# Patient Record
Sex: FEMALE | Race: WHITE | NOT HISPANIC OR LATINO | Employment: PART TIME | URBAN - METROPOLITAN AREA
[De-identification: names, ages, dates, MRNs, and addresses within clinical notes are randomized per-mention and may not be internally consistent; named-entity substitution may affect disease eponyms.]

---

## 2017-09-11 ENCOUNTER — COMMUNICATION - HEALTHEAST (OUTPATIENT)
Dept: FAMILY MEDICINE | Facility: CLINIC | Age: 37
End: 2017-09-11

## 2017-09-15 ENCOUNTER — AMBULATORY - HEALTHEAST (OUTPATIENT)
Dept: NURSING | Facility: CLINIC | Age: 37
End: 2017-09-15

## 2017-12-11 ENCOUNTER — OFFICE VISIT - HEALTHEAST (OUTPATIENT)
Dept: ALLERGY | Facility: CLINIC | Age: 37
End: 2017-12-11

## 2017-12-11 DIAGNOSIS — T78.1XXD ADVERSE FOOD REACTION, SUBSEQUENT ENCOUNTER: ICD-10-CM

## 2017-12-11 DIAGNOSIS — R09.81 NASAL CONGESTION: ICD-10-CM

## 2017-12-11 DIAGNOSIS — J31.0 RHINITIS: ICD-10-CM

## 2017-12-11 ASSESSMENT — MIFFLIN-ST. JEOR: SCORE: 1343.54

## 2018-02-13 ENCOUNTER — AMBULATORY - HEALTHEAST (OUTPATIENT)
Dept: MULTI SPECIALTY CLINIC | Facility: CLINIC | Age: 38
End: 2018-02-13

## 2018-02-13 LAB
HPV_EXT - HISTORICAL: NORMAL
PAP SMEAR - HIM PATIENT REPORTED: NORMAL

## 2018-09-05 ENCOUNTER — COMMUNICATION - HEALTHEAST (OUTPATIENT)
Dept: FAMILY MEDICINE | Facility: CLINIC | Age: 38
End: 2018-09-05

## 2018-09-17 ENCOUNTER — AMBULATORY - HEALTHEAST (OUTPATIENT)
Dept: NURSING | Facility: CLINIC | Age: 38
End: 2018-09-17

## 2018-09-17 DIAGNOSIS — Z23 NEED FOR INFLUENZA VACCINATION: ICD-10-CM

## 2019-12-17 ENCOUNTER — OFFICE VISIT - HEALTHEAST (OUTPATIENT)
Dept: FAMILY MEDICINE | Facility: CLINIC | Age: 39
End: 2019-12-17

## 2019-12-17 DIAGNOSIS — R27.9 LACK OF COORDINATION: ICD-10-CM

## 2019-12-17 DIAGNOSIS — R53.1 RIGHT SIDED WEAKNESS: ICD-10-CM

## 2019-12-17 DIAGNOSIS — R20.0 NUMBNESS: ICD-10-CM

## 2019-12-20 ENCOUNTER — OFFICE VISIT - HEALTHEAST (OUTPATIENT)
Dept: INTERNAL MEDICINE | Facility: CLINIC | Age: 39
End: 2019-12-20

## 2019-12-20 DIAGNOSIS — M62.81 GENERALIZED MUSCLE WEAKNESS: ICD-10-CM

## 2019-12-20 DIAGNOSIS — R53.83 FATIGUE, UNSPECIFIED TYPE: ICD-10-CM

## 2019-12-20 DIAGNOSIS — R20.0 NUMBNESS: ICD-10-CM

## 2019-12-20 LAB
ALBUMIN SERPL-MCNC: 4.5 G/DL (ref 3.5–5)
ALP SERPL-CCNC: 54 U/L (ref 45–120)
ALT SERPL W P-5'-P-CCNC: 15 U/L (ref 0–45)
ANION GAP SERPL CALCULATED.3IONS-SCNC: 12 MMOL/L (ref 5–18)
AST SERPL W P-5'-P-CCNC: 15 U/L (ref 0–40)
BILIRUB SERPL-MCNC: 0.5 MG/DL (ref 0–1)
BUN SERPL-MCNC: 14 MG/DL (ref 8–22)
C REACTIVE PROTEIN LHE: <0.1 MG/DL (ref 0–0.8)
CALCIUM SERPL-MCNC: 9.5 MG/DL (ref 8.5–10.5)
CHLORIDE BLD-SCNC: 108 MMOL/L (ref 98–107)
CO2 SERPL-SCNC: 21 MMOL/L (ref 22–31)
CREAT SERPL-MCNC: 0.74 MG/DL (ref 0.6–1.1)
ERYTHROCYTE [DISTWIDTH] IN BLOOD BY AUTOMATED COUNT: 10.7 % (ref 11–14.5)
ERYTHROCYTE [SEDIMENTATION RATE] IN BLOOD BY WESTERGREN METHOD: 8 MM/HR (ref 0–20)
GFR SERPL CREATININE-BSD FRML MDRD: >60 ML/MIN/1.73M2
GLUCOSE BLD-MCNC: 98 MG/DL (ref 70–125)
HCT VFR BLD AUTO: 42.6 % (ref 35–47)
HGB BLD-MCNC: 14.3 G/DL (ref 12–16)
MCH RBC QN AUTO: 29.5 PG (ref 27–34)
MCHC RBC AUTO-ENTMCNC: 33.5 G/DL (ref 32–36)
MCV RBC AUTO: 88 FL (ref 80–100)
PLATELET # BLD AUTO: 245 THOU/UL (ref 140–440)
PMV BLD AUTO: 8.6 FL (ref 7–10)
POTASSIUM BLD-SCNC: 3.7 MMOL/L (ref 3.5–5)
PROT SERPL-MCNC: 8.2 G/DL (ref 6–8)
RBC # BLD AUTO: 4.83 MILL/UL (ref 3.8–5.4)
RHEUMATOID FACT SERPL-ACNC: <15 IU/ML (ref 0–30)
SODIUM SERPL-SCNC: 141 MMOL/L (ref 136–145)
TSH SERPL DL<=0.005 MIU/L-ACNC: 2.32 UIU/ML (ref 0.3–5)
VIT B12 SERPL-MCNC: 599 PG/ML (ref 213–816)
WBC: 9 THOU/UL (ref 4–11)

## 2019-12-20 ASSESSMENT — PATIENT HEALTH QUESTIONNAIRE - PHQ9: SUM OF ALL RESPONSES TO PHQ QUESTIONS 1-9: 15

## 2019-12-20 ASSESSMENT — MIFFLIN-ST. JEOR: SCORE: 1284.57

## 2019-12-22 LAB — 25(OH)D3 SERPL-MCNC: 12.9 NG/ML (ref 30–80)

## 2019-12-23 LAB
ANA SER QL: 0.3 U
B BURGDOR IGG+IGM SER QL: 0.47 INDEX VALUE

## 2019-12-27 ENCOUNTER — OFFICE VISIT - HEALTHEAST (OUTPATIENT)
Dept: INTERNAL MEDICINE | Facility: CLINIC | Age: 39
End: 2019-12-27

## 2019-12-27 ENCOUNTER — COMMUNICATION - HEALTHEAST (OUTPATIENT)
Dept: NEUROSURGERY | Facility: CLINIC | Age: 39
End: 2019-12-27

## 2019-12-27 DIAGNOSIS — R20.2 TINGLING: ICD-10-CM

## 2019-12-27 DIAGNOSIS — M62.81 MUSCLE WEAKNESS (GENERALIZED): ICD-10-CM

## 2019-12-27 LAB — CK SERPL-CCNC: 119 U/L (ref 30–190)

## 2019-12-27 ASSESSMENT — MIFFLIN-ST. JEOR: SCORE: 1293.64

## 2020-01-06 ENCOUNTER — COMMUNICATION - HEALTHEAST (OUTPATIENT)
Dept: INTERNAL MEDICINE | Facility: CLINIC | Age: 40
End: 2020-01-06

## 2020-01-06 ENCOUNTER — COMMUNICATION - HEALTHEAST (OUTPATIENT)
Dept: SCHEDULING | Facility: CLINIC | Age: 40
End: 2020-01-06

## 2020-01-09 ENCOUNTER — OFFICE VISIT - HEALTHEAST (OUTPATIENT)
Dept: NEUROSURGERY | Facility: CLINIC | Age: 40
End: 2020-01-09

## 2020-01-09 ENCOUNTER — COMMUNICATION - HEALTHEAST (OUTPATIENT)
Dept: INTERNAL MEDICINE | Facility: CLINIC | Age: 40
End: 2020-01-09

## 2020-01-09 DIAGNOSIS — F41.9 ANXIETY: ICD-10-CM

## 2020-01-09 DIAGNOSIS — I67.1 MIDDLE CEREBRAL ARTERY ANEURYSM: ICD-10-CM

## 2020-01-09 ASSESSMENT — MIFFLIN-ST. JEOR: SCORE: 1293.64

## 2020-01-14 ENCOUNTER — COMMUNICATION - HEALTHEAST (OUTPATIENT)
Dept: INTERNAL MEDICINE | Facility: CLINIC | Age: 40
End: 2020-01-14

## 2020-01-17 ENCOUNTER — COMMUNICATION - HEALTHEAST (OUTPATIENT)
Dept: INTERNAL MEDICINE | Facility: CLINIC | Age: 40
End: 2020-01-17

## 2020-01-27 ENCOUNTER — COMMUNICATION - HEALTHEAST (OUTPATIENT)
Dept: INTERNAL MEDICINE | Facility: CLINIC | Age: 40
End: 2020-01-27

## 2020-01-31 ENCOUNTER — RECORDS - HEALTHEAST (OUTPATIENT)
Dept: ADMINISTRATIVE | Facility: OTHER | Age: 40
End: 2020-01-31

## 2020-02-03 ENCOUNTER — AMBULATORY - HEALTHEAST (OUTPATIENT)
Dept: ADMINISTRATIVE | Facility: REHABILITATION | Age: 40
End: 2020-02-03

## 2020-02-03 DIAGNOSIS — R53.1 GENERAL WEAKNESS: ICD-10-CM

## 2020-02-03 DIAGNOSIS — R20.0 NUMBNESS: ICD-10-CM

## 2020-02-07 ENCOUNTER — HOSPITAL ENCOUNTER (OUTPATIENT)
Dept: MRI IMAGING | Facility: CLINIC | Age: 40
Discharge: HOME | End: 2020-02-07
Attending: PSYCHIATRY & NEUROLOGY

## 2020-02-07 ENCOUNTER — RECORDS - HEALTHEAST (OUTPATIENT)
Dept: LAB | Facility: CLINIC | Age: 40
End: 2020-02-07

## 2020-02-07 DIAGNOSIS — R20.0 NUMBNESS: ICD-10-CM

## 2020-02-07 DIAGNOSIS — R53.1 GENERALIZED WEAKNESS: ICD-10-CM

## 2020-02-07 LAB
CK SERPL-CCNC: 116 U/L (ref 30–190)
ERYTHROCYTE [SEDIMENTATION RATE] IN BLOOD BY WESTERGREN METHOD: 6 MM/HR (ref 0–20)
FERRITIN SERPL-MCNC: 17 NG/ML (ref 10–130)
MAGNESIUM SERPL-MCNC: 1.9 MG/DL (ref 1.8–2.6)

## 2020-02-08 LAB — METHYLMALONATE SERPL-SCNC: 0.13 UMOL/L (ref 0–0.4)

## 2020-02-11 LAB
ALBUMIN PERCENT: 63.6 % (ref 51–67)
ALBUMIN SERPL ELPH-MCNC: 4.2 G/DL (ref 3.2–4.7)
ALPHA 1 PERCENT: 2.7 % (ref 2–4)
ALPHA 2 PERCENT: 10.2 % (ref 5–13)
ALPHA1 GLOB SERPL ELPH-MCNC: 0.2 G/DL (ref 0.1–0.3)
ALPHA2 GLOB SERPL ELPH-MCNC: 0.7 G/DL (ref 0.4–0.9)
B-GLOBULIN SERPL ELPH-MCNC: 0.8 G/DL (ref 0.7–1.2)
BETA PERCENT: 11.6 % (ref 10–17)
GAMMA GLOB SERPL ELPH-MCNC: 0.8 G/DL (ref 0.6–1.4)
GAMMA GLOBULIN PERCENT: 11.9 % (ref 9–20)
PATH ICD:: NORMAL
PROT PATTERN SERPL ELPH-IMP: NORMAL
PROT SERPL-MCNC: 6.6 G/DL (ref 6–8)
REVIEWING PATHOLOGIST: NORMAL

## 2020-02-13 LAB — MUSK AB SER-SCNC: 0 NMOL/L (ref 0–0.02)

## 2020-02-14 LAB
ACHR BIND IGG+IGM SER IA-SCNC: 0 NMOL/L
ACHR MOD AB/ACHR TOTAL SFR SER: 0 %
IMMUNOLOGIST REVIEW: NORMAL
STRIA MUS AB TITR SER: NEGATIVE TITER

## 2020-03-04 ENCOUNTER — RECORDS - HEALTHEAST (OUTPATIENT)
Dept: ADMINISTRATIVE | Facility: OTHER | Age: 40
End: 2020-03-04

## 2020-05-04 ENCOUNTER — OFFICE VISIT - HEALTHEAST (OUTPATIENT)
Dept: INTERNAL MEDICINE | Facility: CLINIC | Age: 40
End: 2020-05-04

## 2020-05-04 DIAGNOSIS — F41.9 ANXIETY: ICD-10-CM

## 2020-05-04 DIAGNOSIS — Z86.19 HX OF VIRAL ILLNESS: ICD-10-CM

## 2020-05-04 DIAGNOSIS — M79.10 MYALGIA: ICD-10-CM

## 2020-05-04 ASSESSMENT — ANXIETY QUESTIONNAIRES
5. BEING SO RESTLESS THAT IT IS HARD TO SIT STILL: MORE THAN HALF THE DAYS
1. FEELING NERVOUS, ANXIOUS, OR ON EDGE: NEARLY EVERY DAY
3. WORRYING TOO MUCH ABOUT DIFFERENT THINGS: NEARLY EVERY DAY
4. TROUBLE RELAXING: NEARLY EVERY DAY
2. NOT BEING ABLE TO STOP OR CONTROL WORRYING: NEARLY EVERY DAY
7. FEELING AFRAID AS IF SOMETHING AWFUL MIGHT HAPPEN: NEARLY EVERY DAY
IF YOU CHECKED OFF ANY PROBLEMS ON THIS QUESTIONNAIRE, HOW DIFFICULT HAVE THESE PROBLEMS MADE IT FOR YOU TO DO YOUR WORK, TAKE CARE OF THINGS AT HOME, OR GET ALONG WITH OTHER PEOPLE: EXTREMELY DIFFICULT
6. BECOMING EASILY ANNOYED OR IRRITABLE: NEARLY EVERY DAY
GAD7 TOTAL SCORE: 20

## 2020-05-18 ENCOUNTER — OFFICE VISIT - HEALTHEAST (OUTPATIENT)
Dept: INTERNAL MEDICINE | Facility: CLINIC | Age: 40
End: 2020-05-18

## 2020-05-18 ENCOUNTER — COMMUNICATION - HEALTHEAST (OUTPATIENT)
Dept: INTERNAL MEDICINE | Facility: CLINIC | Age: 40
End: 2020-05-18

## 2020-05-18 DIAGNOSIS — R20.0 NUMBNESS: ICD-10-CM

## 2020-05-18 DIAGNOSIS — F32.A DEPRESSIVE DISORDER: ICD-10-CM

## 2020-05-18 DIAGNOSIS — R53.83 FATIGUE, UNSPECIFIED TYPE: ICD-10-CM

## 2020-05-20 ENCOUNTER — OFFICE VISIT - HEALTHEAST (OUTPATIENT)
Dept: BEHAVIORAL HEALTH | Facility: CLINIC | Age: 40
End: 2020-05-20

## 2020-05-20 DIAGNOSIS — F43.23 ADJUSTMENT DISORDER WITH MIXED ANXIETY AND DEPRESSED MOOD: ICD-10-CM

## 2020-05-20 ASSESSMENT — ANXIETY QUESTIONNAIRES
GAD7 TOTAL SCORE: 21
6. BECOMING EASILY ANNOYED OR IRRITABLE: NEARLY EVERY DAY
3. WORRYING TOO MUCH ABOUT DIFFERENT THINGS: NEARLY EVERY DAY
4. TROUBLE RELAXING: NEARLY EVERY DAY
IF YOU CHECKED OFF ANY PROBLEMS ON THIS QUESTIONNAIRE, HOW DIFFICULT HAVE THESE PROBLEMS MADE IT FOR YOU TO DO YOUR WORK, TAKE CARE OF THINGS AT HOME, OR GET ALONG WITH OTHER PEOPLE: EXTREMELY DIFFICULT
7. FEELING AFRAID AS IF SOMETHING AWFUL MIGHT HAPPEN: NEARLY EVERY DAY
1. FEELING NERVOUS, ANXIOUS, OR ON EDGE: NEARLY EVERY DAY
5. BEING SO RESTLESS THAT IT IS HARD TO SIT STILL: NEARLY EVERY DAY
2. NOT BEING ABLE TO STOP OR CONTROL WORRYING: NEARLY EVERY DAY

## 2020-05-20 ASSESSMENT — PATIENT HEALTH QUESTIONNAIRE - PHQ9: SUM OF ALL RESPONSES TO PHQ QUESTIONS 1-9: 22

## 2020-05-22 ENCOUNTER — COMMUNICATION - HEALTHEAST (OUTPATIENT)
Dept: INTERNAL MEDICINE | Facility: CLINIC | Age: 40
End: 2020-05-22

## 2020-05-22 ENCOUNTER — COMMUNICATION - HEALTHEAST (OUTPATIENT)
Dept: SCHEDULING | Facility: CLINIC | Age: 40
End: 2020-05-22

## 2020-06-09 ENCOUNTER — COMMUNICATION - HEALTHEAST (OUTPATIENT)
Dept: RHEUMATOLOGY | Facility: CLINIC | Age: 40
End: 2020-06-09

## 2020-06-09 ENCOUNTER — OFFICE VISIT - HEALTHEAST (OUTPATIENT)
Dept: RHEUMATOLOGY | Facility: CLINIC | Age: 40
End: 2020-06-09

## 2020-06-09 DIAGNOSIS — M79.18 MYOFASCIAL PAIN: ICD-10-CM

## 2020-06-09 DIAGNOSIS — M54.2 CHRONIC NECK PAIN: ICD-10-CM

## 2020-06-09 DIAGNOSIS — G89.29 CHRONIC UPPER BACK PAIN: ICD-10-CM

## 2020-06-09 DIAGNOSIS — G89.29 CHRONIC NECK PAIN: ICD-10-CM

## 2020-06-09 DIAGNOSIS — M25.50 MULTIPLE JOINT PAIN: ICD-10-CM

## 2020-06-09 DIAGNOSIS — M54.9 CHRONIC UPPER BACK PAIN: ICD-10-CM

## 2020-06-09 DIAGNOSIS — R53.1 FEELING WEAK: ICD-10-CM

## 2020-06-09 DIAGNOSIS — E55.9 VITAMIN D DEFICIENCY: ICD-10-CM

## 2020-06-09 DIAGNOSIS — R20.2 PARESTHESIA: ICD-10-CM

## 2020-06-09 RX ORDER — CYCLOBENZAPRINE HCL 10 MG
TABLET ORAL
Qty: 30 TABLET | Refills: 1 | Status: SHIPPED | OUTPATIENT
Start: 2020-06-09 | End: 2022-04-19

## 2020-06-11 ENCOUNTER — COMMUNICATION - HEALTHEAST (OUTPATIENT)
Dept: ADMINISTRATIVE | Facility: CLINIC | Age: 40
End: 2020-06-11

## 2020-06-16 ENCOUNTER — OFFICE VISIT - HEALTHEAST (OUTPATIENT)
Dept: BEHAVIORAL HEALTH | Facility: CLINIC | Age: 40
End: 2020-06-16

## 2020-06-16 ENCOUNTER — COMMUNICATION - HEALTHEAST (OUTPATIENT)
Dept: BEHAVIORAL HEALTH | Facility: CLINIC | Age: 40
End: 2020-06-16

## 2020-06-16 DIAGNOSIS — F43.23 ADJUSTMENT DISORDER WITH MIXED ANXIETY AND DEPRESSED MOOD: ICD-10-CM

## 2020-06-17 ENCOUNTER — COMMUNICATION - HEALTHEAST (OUTPATIENT)
Dept: BEHAVIORAL HEALTH | Facility: CLINIC | Age: 40
End: 2020-06-17

## 2020-06-17 ENCOUNTER — OFFICE VISIT - HEALTHEAST (OUTPATIENT)
Dept: BEHAVIORAL HEALTH | Facility: CLINIC | Age: 40
End: 2020-06-17

## 2020-06-17 DIAGNOSIS — F32.A DEPRESSIVE DISORDER: ICD-10-CM

## 2020-06-17 ASSESSMENT — ANXIETY QUESTIONNAIRES
7. FEELING AFRAID AS IF SOMETHING AWFUL MIGHT HAPPEN: NEARLY EVERY DAY
GAD7 TOTAL SCORE: 19
5. BEING SO RESTLESS THAT IT IS HARD TO SIT STILL: SEVERAL DAYS
6. BECOMING EASILY ANNOYED OR IRRITABLE: NEARLY EVERY DAY
3. WORRYING TOO MUCH ABOUT DIFFERENT THINGS: NEARLY EVERY DAY
2. NOT BEING ABLE TO STOP OR CONTROL WORRYING: NEARLY EVERY DAY
1. FEELING NERVOUS, ANXIOUS, OR ON EDGE: NEARLY EVERY DAY
4. TROUBLE RELAXING: NEARLY EVERY DAY

## 2020-06-17 ASSESSMENT — PATIENT HEALTH QUESTIONNAIRE - PHQ9: SUM OF ALL RESPONSES TO PHQ QUESTIONS 1-9: 22

## 2020-06-22 ENCOUNTER — OFFICE VISIT - HEALTHEAST (OUTPATIENT)
Dept: INTERNAL MEDICINE | Facility: CLINIC | Age: 40
End: 2020-06-22

## 2020-06-22 DIAGNOSIS — G89.29 OTHER CHRONIC PAIN: ICD-10-CM

## 2020-06-22 DIAGNOSIS — F32.A DEPRESSIVE DISORDER: ICD-10-CM

## 2020-06-22 DIAGNOSIS — F41.9 ANXIETY: ICD-10-CM

## 2020-06-22 RX ORDER — IBUPROFEN 200 MG
400 TABLET ORAL EVERY 6 HOURS PRN
Status: SHIPPED | COMMUNITY
Start: 2020-06-22 | End: 2022-04-19

## 2020-06-22 RX ORDER — ACETAMINOPHEN 500 MG
500 TABLET ORAL EVERY 6 HOURS PRN
Status: SHIPPED | COMMUNITY
Start: 2020-06-22

## 2020-06-25 ENCOUNTER — OFFICE VISIT - HEALTHEAST (OUTPATIENT)
Dept: BEHAVIORAL HEALTH | Facility: CLINIC | Age: 40
End: 2020-06-25

## 2020-06-25 DIAGNOSIS — F43.23 ADJUSTMENT DISORDER WITH MIXED ANXIETY AND DEPRESSED MOOD: ICD-10-CM

## 2020-07-02 ENCOUNTER — OFFICE VISIT - HEALTHEAST (OUTPATIENT)
Dept: BEHAVIORAL HEALTH | Facility: CLINIC | Age: 40
End: 2020-07-02

## 2020-07-02 DIAGNOSIS — F43.23 ADJUSTMENT DISORDER WITH MIXED ANXIETY AND DEPRESSED MOOD: ICD-10-CM

## 2020-07-02 ASSESSMENT — ANXIETY QUESTIONNAIRES
5. BEING SO RESTLESS THAT IT IS HARD TO SIT STILL: NEARLY EVERY DAY
7. FEELING AFRAID AS IF SOMETHING AWFUL MIGHT HAPPEN: NEARLY EVERY DAY
6. BECOMING EASILY ANNOYED OR IRRITABLE: NEARLY EVERY DAY
2. NOT BEING ABLE TO STOP OR CONTROL WORRYING: NEARLY EVERY DAY
4. TROUBLE RELAXING: NEARLY EVERY DAY
3. WORRYING TOO MUCH ABOUT DIFFERENT THINGS: NEARLY EVERY DAY
GAD7 TOTAL SCORE: 21
1. FEELING NERVOUS, ANXIOUS, OR ON EDGE: NEARLY EVERY DAY

## 2020-07-02 ASSESSMENT — PATIENT HEALTH QUESTIONNAIRE - PHQ9: SUM OF ALL RESPONSES TO PHQ QUESTIONS 1-9: 23

## 2020-07-06 ENCOUNTER — OFFICE VISIT - HEALTHEAST (OUTPATIENT)
Dept: BEHAVIORAL HEALTH | Facility: CLINIC | Age: 40
End: 2020-07-06

## 2020-07-06 DIAGNOSIS — F43.23 ADJUSTMENT DISORDER WITH MIXED ANXIETY AND DEPRESSED MOOD: ICD-10-CM

## 2020-07-09 ENCOUNTER — COMMUNICATION - HEALTHEAST (OUTPATIENT)
Dept: BEHAVIORAL HEALTH | Facility: CLINIC | Age: 40
End: 2020-07-09

## 2020-07-09 DIAGNOSIS — F32.A DEPRESSIVE DISORDER: ICD-10-CM

## 2020-07-13 ENCOUNTER — OFFICE VISIT - HEALTHEAST (OUTPATIENT)
Dept: BEHAVIORAL HEALTH | Facility: CLINIC | Age: 40
End: 2020-07-13

## 2020-07-13 ENCOUNTER — AMBULATORY - HEALTHEAST (OUTPATIENT)
Dept: BEHAVIORAL HEALTH | Facility: CLINIC | Age: 40
End: 2020-07-13

## 2020-07-13 DIAGNOSIS — F43.23 ADJUSTMENT DISORDER WITH MIXED ANXIETY AND DEPRESSED MOOD: ICD-10-CM

## 2020-07-20 ENCOUNTER — OFFICE VISIT - HEALTHEAST (OUTPATIENT)
Dept: BEHAVIORAL HEALTH | Facility: CLINIC | Age: 40
End: 2020-07-20

## 2020-07-20 DIAGNOSIS — F43.23 ADJUSTMENT DISORDER WITH MIXED ANXIETY AND DEPRESSED MOOD: ICD-10-CM

## 2020-07-24 ENCOUNTER — OFFICE VISIT - HEALTHEAST (OUTPATIENT)
Dept: BEHAVIORAL HEALTH | Facility: CLINIC | Age: 40
End: 2020-07-24

## 2020-07-24 DIAGNOSIS — F43.23 ADJUSTMENT DISORDER WITH MIXED ANXIETY AND DEPRESSED MOOD: ICD-10-CM

## 2020-07-24 DIAGNOSIS — F32.A DEPRESSIVE DISORDER: ICD-10-CM

## 2020-07-24 ASSESSMENT — PATIENT HEALTH QUESTIONNAIRE - PHQ9: SUM OF ALL RESPONSES TO PHQ QUESTIONS 1-9: 22

## 2020-07-24 ASSESSMENT — ANXIETY QUESTIONNAIRES
6. BECOMING EASILY ANNOYED OR IRRITABLE: MORE THAN HALF THE DAYS
3. WORRYING TOO MUCH ABOUT DIFFERENT THINGS: NEARLY EVERY DAY
4. TROUBLE RELAXING: NEARLY EVERY DAY
GAD7 TOTAL SCORE: 19
7. FEELING AFRAID AS IF SOMETHING AWFUL MIGHT HAPPEN: NEARLY EVERY DAY
5. BEING SO RESTLESS THAT IT IS HARD TO SIT STILL: MORE THAN HALF THE DAYS
1. FEELING NERVOUS, ANXIOUS, OR ON EDGE: NEARLY EVERY DAY
2. NOT BEING ABLE TO STOP OR CONTROL WORRYING: NEARLY EVERY DAY

## 2020-07-27 ENCOUNTER — OFFICE VISIT - HEALTHEAST (OUTPATIENT)
Dept: BEHAVIORAL HEALTH | Facility: CLINIC | Age: 40
End: 2020-07-27

## 2020-07-27 DIAGNOSIS — F43.23 ADJUSTMENT DISORDER WITH MIXED ANXIETY AND DEPRESSED MOOD: ICD-10-CM

## 2020-08-03 ENCOUNTER — OFFICE VISIT - HEALTHEAST (OUTPATIENT)
Dept: BEHAVIORAL HEALTH | Facility: CLINIC | Age: 40
End: 2020-08-03

## 2020-08-03 DIAGNOSIS — F43.23 ADJUSTMENT DISORDER WITH MIXED ANXIETY AND DEPRESSED MOOD: ICD-10-CM

## 2020-08-07 ENCOUNTER — OFFICE VISIT - HEALTHEAST (OUTPATIENT)
Dept: BEHAVIORAL HEALTH | Facility: CLINIC | Age: 40
End: 2020-08-07

## 2020-08-07 DIAGNOSIS — G89.29 OTHER CHRONIC PAIN: ICD-10-CM

## 2020-08-07 DIAGNOSIS — F43.23 ADJUSTMENT DISORDER WITH MIXED ANXIETY AND DEPRESSED MOOD: ICD-10-CM

## 2020-08-07 ASSESSMENT — ANXIETY QUESTIONNAIRES
2. NOT BEING ABLE TO STOP OR CONTROL WORRYING: NEARLY EVERY DAY
5. BEING SO RESTLESS THAT IT IS HARD TO SIT STILL: MORE THAN HALF THE DAYS
GAD7 TOTAL SCORE: 20
1. FEELING NERVOUS, ANXIOUS, OR ON EDGE: NEARLY EVERY DAY
3. WORRYING TOO MUCH ABOUT DIFFERENT THINGS: NEARLY EVERY DAY
4. TROUBLE RELAXING: NEARLY EVERY DAY
7. FEELING AFRAID AS IF SOMETHING AWFUL MIGHT HAPPEN: NEARLY EVERY DAY
6. BECOMING EASILY ANNOYED OR IRRITABLE: NEARLY EVERY DAY

## 2020-08-07 ASSESSMENT — PATIENT HEALTH QUESTIONNAIRE - PHQ9: SUM OF ALL RESPONSES TO PHQ QUESTIONS 1-9: 23

## 2020-08-10 ENCOUNTER — OFFICE VISIT - HEALTHEAST (OUTPATIENT)
Dept: BEHAVIORAL HEALTH | Facility: CLINIC | Age: 40
End: 2020-08-10

## 2020-08-10 DIAGNOSIS — F43.23 ADJUSTMENT DISORDER WITH MIXED ANXIETY AND DEPRESSED MOOD: ICD-10-CM

## 2020-08-17 ENCOUNTER — OFFICE VISIT - HEALTHEAST (OUTPATIENT)
Dept: BEHAVIORAL HEALTH | Facility: CLINIC | Age: 40
End: 2020-08-17

## 2020-08-17 DIAGNOSIS — F43.23 ADJUSTMENT DISORDER WITH MIXED ANXIETY AND DEPRESSED MOOD: ICD-10-CM

## 2020-08-21 ENCOUNTER — OFFICE VISIT - HEALTHEAST (OUTPATIENT)
Dept: BEHAVIORAL HEALTH | Facility: CLINIC | Age: 40
End: 2020-08-21

## 2020-08-21 DIAGNOSIS — F32.A DEPRESSIVE DISORDER: ICD-10-CM

## 2020-08-21 DIAGNOSIS — F43.23 ADJUSTMENT DISORDER WITH MIXED ANXIETY AND DEPRESSED MOOD: ICD-10-CM

## 2020-08-21 DIAGNOSIS — G89.29 OTHER CHRONIC PAIN: ICD-10-CM

## 2020-08-21 DIAGNOSIS — Z87.828 HISTORY OF TRAUMA: ICD-10-CM

## 2020-08-21 ASSESSMENT — ANXIETY QUESTIONNAIRES
2. NOT BEING ABLE TO STOP OR CONTROL WORRYING: NEARLY EVERY DAY
4. TROUBLE RELAXING: NEARLY EVERY DAY
GAD7 TOTAL SCORE: 18
IF YOU CHECKED OFF ANY PROBLEMS ON THIS QUESTIONNAIRE, HOW DIFFICULT HAVE THESE PROBLEMS MADE IT FOR YOU TO DO YOUR WORK, TAKE CARE OF THINGS AT HOME, OR GET ALONG WITH OTHER PEOPLE: EXTREMELY DIFFICULT
3. WORRYING TOO MUCH ABOUT DIFFERENT THINGS: NEARLY EVERY DAY
1. FEELING NERVOUS, ANXIOUS, OR ON EDGE: NEARLY EVERY DAY
5. BEING SO RESTLESS THAT IT IS HARD TO SIT STILL: SEVERAL DAYS
7. FEELING AFRAID AS IF SOMETHING AWFUL MIGHT HAPPEN: NEARLY EVERY DAY
6. BECOMING EASILY ANNOYED OR IRRITABLE: MORE THAN HALF THE DAYS

## 2020-08-21 ASSESSMENT — PATIENT HEALTH QUESTIONNAIRE - PHQ9: SUM OF ALL RESPONSES TO PHQ QUESTIONS 1-9: 23

## 2020-08-24 ENCOUNTER — OFFICE VISIT - HEALTHEAST (OUTPATIENT)
Dept: BEHAVIORAL HEALTH | Facility: CLINIC | Age: 40
End: 2020-08-24

## 2020-08-24 DIAGNOSIS — F43.23 ADJUSTMENT DISORDER WITH MIXED ANXIETY AND DEPRESSED MOOD: ICD-10-CM

## 2020-09-03 ENCOUNTER — OFFICE VISIT - HEALTHEAST (OUTPATIENT)
Dept: RHEUMATOLOGY | Facility: CLINIC | Age: 40
End: 2020-09-03

## 2020-09-03 ENCOUNTER — COMMUNICATION - HEALTHEAST (OUTPATIENT)
Dept: LAB | Facility: CLINIC | Age: 40
End: 2020-09-03

## 2020-09-03 DIAGNOSIS — M35.3 POLYMYALGIA (H): ICD-10-CM

## 2020-09-03 DIAGNOSIS — M25.50 POLYARTHRALGIA: ICD-10-CM

## 2020-09-08 ENCOUNTER — OFFICE VISIT - HEALTHEAST (OUTPATIENT)
Dept: BEHAVIORAL HEALTH | Facility: CLINIC | Age: 40
End: 2020-09-08

## 2020-09-08 DIAGNOSIS — F43.23 ADJUSTMENT DISORDER WITH MIXED ANXIETY AND DEPRESSED MOOD: ICD-10-CM

## 2020-09-14 ENCOUNTER — OFFICE VISIT - HEALTHEAST (OUTPATIENT)
Dept: BEHAVIORAL HEALTH | Facility: CLINIC | Age: 40
End: 2020-09-14

## 2020-09-14 DIAGNOSIS — F43.23 ADJUSTMENT DISORDER WITH MIXED ANXIETY AND DEPRESSED MOOD: ICD-10-CM

## 2020-09-21 ENCOUNTER — OFFICE VISIT - HEALTHEAST (OUTPATIENT)
Dept: BEHAVIORAL HEALTH | Facility: CLINIC | Age: 40
End: 2020-09-21

## 2020-09-21 DIAGNOSIS — F43.23 ADJUSTMENT DISORDER WITH MIXED ANXIETY AND DEPRESSED MOOD: ICD-10-CM

## 2020-09-26 ENCOUNTER — AMBULATORY - HEALTHEAST (OUTPATIENT)
Dept: NURSING | Facility: CLINIC | Age: 40
End: 2020-09-26

## 2020-09-30 ENCOUNTER — OFFICE VISIT - HEALTHEAST (OUTPATIENT)
Dept: BEHAVIORAL HEALTH | Facility: CLINIC | Age: 40
End: 2020-09-30

## 2020-09-30 DIAGNOSIS — G89.29 OTHER CHRONIC PAIN: ICD-10-CM

## 2020-09-30 DIAGNOSIS — F43.23 ADJUSTMENT DISORDER WITH MIXED ANXIETY AND DEPRESSED MOOD: ICD-10-CM

## 2020-09-30 ASSESSMENT — ANXIETY QUESTIONNAIRES
5. BEING SO RESTLESS THAT IT IS HARD TO SIT STILL: SEVERAL DAYS
1. FEELING NERVOUS, ANXIOUS, OR ON EDGE: MORE THAN HALF THE DAYS
7. FEELING AFRAID AS IF SOMETHING AWFUL MIGHT HAPPEN: MORE THAN HALF THE DAYS
4. TROUBLE RELAXING: MORE THAN HALF THE DAYS
GAD7 TOTAL SCORE: 12
6. BECOMING EASILY ANNOYED OR IRRITABLE: SEVERAL DAYS
2. NOT BEING ABLE TO STOP OR CONTROL WORRYING: MORE THAN HALF THE DAYS
IF YOU CHECKED OFF ANY PROBLEMS ON THIS QUESTIONNAIRE, HOW DIFFICULT HAVE THESE PROBLEMS MADE IT FOR YOU TO DO YOUR WORK, TAKE CARE OF THINGS AT HOME, OR GET ALONG WITH OTHER PEOPLE: SOMEWHAT DIFFICULT
3. WORRYING TOO MUCH ABOUT DIFFERENT THINGS: MORE THAN HALF THE DAYS

## 2020-09-30 ASSESSMENT — PATIENT HEALTH QUESTIONNAIRE - PHQ9: SUM OF ALL RESPONSES TO PHQ QUESTIONS 1-9: 19

## 2020-10-09 ENCOUNTER — OFFICE VISIT - HEALTHEAST (OUTPATIENT)
Dept: BEHAVIORAL HEALTH | Facility: CLINIC | Age: 40
End: 2020-10-09

## 2020-10-09 DIAGNOSIS — F43.23 ADJUSTMENT DISORDER WITH MIXED ANXIETY AND DEPRESSED MOOD: ICD-10-CM

## 2020-10-16 ENCOUNTER — OFFICE VISIT - HEALTHEAST (OUTPATIENT)
Dept: BEHAVIORAL HEALTH | Facility: CLINIC | Age: 40
End: 2020-10-16

## 2020-10-16 DIAGNOSIS — F43.23 ADJUSTMENT DISORDER WITH MIXED ANXIETY AND DEPRESSED MOOD: ICD-10-CM

## 2020-10-21 ENCOUNTER — COMMUNICATION - HEALTHEAST (OUTPATIENT)
Dept: BEHAVIORAL HEALTH | Facility: CLINIC | Age: 40
End: 2020-10-21

## 2020-10-30 ENCOUNTER — AMBULATORY - HEALTHEAST (OUTPATIENT)
Dept: BEHAVIORAL HEALTH | Facility: CLINIC | Age: 40
End: 2020-10-30

## 2020-10-30 ENCOUNTER — OFFICE VISIT - HEALTHEAST (OUTPATIENT)
Dept: BEHAVIORAL HEALTH | Facility: CLINIC | Age: 40
End: 2020-10-30

## 2020-10-30 DIAGNOSIS — F43.23 ADJUSTMENT DISORDER WITH MIXED ANXIETY AND DEPRESSED MOOD: ICD-10-CM

## 2020-10-30 ASSESSMENT — ANXIETY QUESTIONNAIRES
1. FEELING NERVOUS, ANXIOUS, OR ON EDGE: NEARLY EVERY DAY
4. TROUBLE RELAXING: NEARLY EVERY DAY
6. BECOMING EASILY ANNOYED OR IRRITABLE: MORE THAN HALF THE DAYS
5. BEING SO RESTLESS THAT IT IS HARD TO SIT STILL: SEVERAL DAYS
7. FEELING AFRAID AS IF SOMETHING AWFUL MIGHT HAPPEN: NEARLY EVERY DAY
3. WORRYING TOO MUCH ABOUT DIFFERENT THINGS: MORE THAN HALF THE DAYS
GAD7 TOTAL SCORE: 16
IF YOU CHECKED OFF ANY PROBLEMS ON THIS QUESTIONNAIRE, HOW DIFFICULT HAVE THESE PROBLEMS MADE IT FOR YOU TO DO YOUR WORK, TAKE CARE OF THINGS AT HOME, OR GET ALONG WITH OTHER PEOPLE: VERY DIFFICULT
2. NOT BEING ABLE TO STOP OR CONTROL WORRYING: MORE THAN HALF THE DAYS

## 2020-10-30 ASSESSMENT — PATIENT HEALTH QUESTIONNAIRE - PHQ9: SUM OF ALL RESPONSES TO PHQ QUESTIONS 1-9: 12

## 2020-11-02 ENCOUNTER — COMMUNICATION - HEALTHEAST (OUTPATIENT)
Dept: INTERNAL MEDICINE | Facility: CLINIC | Age: 40
End: 2020-11-02

## 2020-11-06 ENCOUNTER — OFFICE VISIT - HEALTHEAST (OUTPATIENT)
Dept: BEHAVIORAL HEALTH | Facility: CLINIC | Age: 40
End: 2020-11-06

## 2020-11-06 DIAGNOSIS — G89.29 OTHER CHRONIC PAIN: ICD-10-CM

## 2020-11-06 DIAGNOSIS — F32.1 CURRENT MODERATE EPISODE OF MAJOR DEPRESSIVE DISORDER WITHOUT PRIOR EPISODE (H): ICD-10-CM

## 2020-11-06 DIAGNOSIS — F43.23 ADJUSTMENT DISORDER WITH MIXED ANXIETY AND DEPRESSED MOOD: ICD-10-CM

## 2020-11-06 ASSESSMENT — ANXIETY QUESTIONNAIRES
3. WORRYING TOO MUCH ABOUT DIFFERENT THINGS: NEARLY EVERY DAY
4. TROUBLE RELAXING: NEARLY EVERY DAY
1. FEELING NERVOUS, ANXIOUS, OR ON EDGE: NEARLY EVERY DAY
6. BECOMING EASILY ANNOYED OR IRRITABLE: NEARLY EVERY DAY
GAD7 TOTAL SCORE: 20
7. FEELING AFRAID AS IF SOMETHING AWFUL MIGHT HAPPEN: NEARLY EVERY DAY
5. BEING SO RESTLESS THAT IT IS HARD TO SIT STILL: MORE THAN HALF THE DAYS
2. NOT BEING ABLE TO STOP OR CONTROL WORRYING: NEARLY EVERY DAY

## 2020-11-06 ASSESSMENT — PATIENT HEALTH QUESTIONNAIRE - PHQ9: SUM OF ALL RESPONSES TO PHQ QUESTIONS 1-9: 20

## 2020-11-12 ENCOUNTER — COMMUNICATION - HEALTHEAST (OUTPATIENT)
Dept: BEHAVIORAL HEALTH | Facility: CLINIC | Age: 40
End: 2020-11-12

## 2020-11-23 ENCOUNTER — AMBULATORY - HEALTHEAST (OUTPATIENT)
Dept: LAB | Facility: CLINIC | Age: 40
End: 2020-11-23

## 2020-11-23 DIAGNOSIS — M35.3 POLYMYALGIA (H): ICD-10-CM

## 2020-11-23 DIAGNOSIS — M25.50 POLYARTHRALGIA: ICD-10-CM

## 2020-11-23 DIAGNOSIS — Z86.19 HX OF VIRAL ILLNESS: ICD-10-CM

## 2020-11-23 DIAGNOSIS — M79.10 MYALGIA: ICD-10-CM

## 2020-11-23 DIAGNOSIS — M54.2 CHRONIC NECK PAIN: ICD-10-CM

## 2020-11-23 DIAGNOSIS — R53.1 FEELING WEAK: ICD-10-CM

## 2020-11-23 DIAGNOSIS — G89.29 CHRONIC UPPER BACK PAIN: ICD-10-CM

## 2020-11-23 DIAGNOSIS — M25.50 MULTIPLE JOINT PAIN: ICD-10-CM

## 2020-11-23 DIAGNOSIS — G89.29 CHRONIC NECK PAIN: ICD-10-CM

## 2020-11-23 DIAGNOSIS — R20.2 PARESTHESIA: ICD-10-CM

## 2020-11-23 DIAGNOSIS — E55.9 VITAMIN D DEFICIENCY: ICD-10-CM

## 2020-11-23 DIAGNOSIS — M54.9 CHRONIC UPPER BACK PAIN: ICD-10-CM

## 2020-11-23 DIAGNOSIS — M79.18 MYOFASCIAL PAIN: ICD-10-CM

## 2020-11-23 LAB
ALBUMIN SERPL-MCNC: 4.1 G/DL (ref 3.5–5)
ALBUMIN UR-MCNC: NEGATIVE MG/DL
ALT SERPL W P-5'-P-CCNC: 14 U/L (ref 0–45)
APPEARANCE UR: CLEAR
AST SERPL W P-5'-P-CCNC: 15 U/L (ref 0–40)
BACTERIA #/AREA URNS HPF: ABNORMAL HPF
BASOPHILS # BLD AUTO: 0 THOU/UL (ref 0–0.2)
BASOPHILS NFR BLD AUTO: 0 % (ref 0–2)
BILIRUB UR QL STRIP: NEGATIVE
C REACTIVE PROTEIN LHE: <0.1 MG/DL (ref 0–0.8)
CALCIUM SERPL-MCNC: 8.9 MG/DL (ref 8.5–10.5)
CK SERPL-CCNC: 143 U/L (ref 30–190)
CK SERPL-CCNC: 148 U/L (ref 30–190)
COLOR UR AUTO: YELLOW
CREAT SERPL-MCNC: 0.73 MG/DL (ref 0.6–1.1)
CREAT UR-MCNC: 132.7 MG/DL
EOSINOPHIL # BLD AUTO: 0.1 THOU/UL (ref 0–0.4)
EOSINOPHIL NFR BLD AUTO: 1 % (ref 0–6)
ERYTHROCYTE [DISTWIDTH] IN BLOOD BY AUTOMATED COUNT: 11 % (ref 11–14.5)
ERYTHROCYTE [SEDIMENTATION RATE] IN BLOOD BY WESTERGREN METHOD: 7 MM/HR (ref 0–20)
GFR SERPL CREATININE-BSD FRML MDRD: >60 ML/MIN/1.73M2
GLUCOSE UR STRIP-MCNC: NEGATIVE MG/DL
HCT VFR BLD AUTO: 40.1 % (ref 35–47)
HGB BLD-MCNC: 13.3 G/DL (ref 12–16)
HGB UR QL STRIP: ABNORMAL
KETONES UR STRIP-MCNC: NEGATIVE MG/DL
LEUKOCYTE ESTERASE UR QL STRIP: ABNORMAL
LYMPHOCYTES # BLD AUTO: 2.6 THOU/UL (ref 0.8–4.4)
LYMPHOCYTES NFR BLD AUTO: 45 % (ref 20–40)
MCH RBC QN AUTO: 30.2 PG (ref 27–34)
MCHC RBC AUTO-ENTMCNC: 33.3 G/DL (ref 32–36)
MCV RBC AUTO: 91 FL (ref 80–100)
MICROALBUMIN UR-MCNC: <0.5 MG/DL (ref 0–1.99)
MICROALBUMIN/CREAT UR: NORMAL MG/G{CREAT}
MONOCYTES # BLD AUTO: 0.3 THOU/UL (ref 0–0.9)
MONOCYTES NFR BLD AUTO: 5 % (ref 2–10)
MUCOUS THREADS #/AREA URNS LPF: ABNORMAL LPF
NEUTROPHILS # BLD AUTO: 2.8 THOU/UL (ref 2–7.7)
NEUTROPHILS NFR BLD AUTO: 49 % (ref 50–70)
NITRATE UR QL: NEGATIVE
PH UR STRIP: 6.5 [PH] (ref 5–8)
PLATELET # BLD AUTO: 200 THOU/UL (ref 140–440)
PMV BLD AUTO: 8.8 FL (ref 7–10)
RBC # BLD AUTO: 4.42 MILL/UL (ref 3.8–5.4)
RBC #/AREA URNS AUTO: ABNORMAL HPF
SP GR UR STRIP: 1.02 (ref 1–1.03)
SQUAMOUS #/AREA URNS AUTO: ABNORMAL LPF
UROBILINOGEN UR STRIP-ACNC: ABNORMAL
WBC #/AREA URNS AUTO: ABNORMAL HPF
WBC: 5.7 THOU/UL (ref 4–11)

## 2020-11-24 LAB
25(OH)D3 SERPL-MCNC: 29.8 NG/ML (ref 30–80)
BACTERIA SPEC CULT: NO GROWTH
CCP AB SER IA-ACNC: <0.5 U/ML
CCP AB SER IA-ACNC: <0.5 U/ML
DNA (DS) ANTIBODY - HISTORICAL: 2 IU
HCV AB SERPL QL IA: NEGATIVE
JO-1 AUTOANTIBODIES - HISTORICAL: 0 EU
MYOGLOBIN SERPL-MCNC: 37 MCG/L

## 2020-11-25 LAB
ACE SERPL-CCNC: 20 U/L (ref 9–67)
ALDOLASE SERPL-CCNC: 2.8 U/L (ref 1.5–8.1)
ANCA IGG TITR SER IF: NORMAL {TITER}

## 2020-11-27 ENCOUNTER — COMMUNICATION - HEALTHEAST (OUTPATIENT)
Dept: SCHEDULING | Facility: CLINIC | Age: 40
End: 2020-11-27

## 2020-12-01 ENCOUNTER — OFFICE VISIT - HEALTHEAST (OUTPATIENT)
Dept: RHEUMATOLOGY | Facility: CLINIC | Age: 40
End: 2020-12-01

## 2020-12-01 DIAGNOSIS — M79.7 FIBROMYALGIA: ICD-10-CM

## 2020-12-01 DIAGNOSIS — F32.A DEPRESSIVE DISORDER: ICD-10-CM

## 2020-12-01 LAB
B LOCUS: NORMAL
B27TEST METHOD: NORMAL

## 2020-12-02 ENCOUNTER — COMMUNICATION - HEALTHEAST (OUTPATIENT)
Dept: BEHAVIORAL HEALTH | Facility: CLINIC | Age: 40
End: 2020-12-02

## 2020-12-02 DIAGNOSIS — F32.1 CURRENT MODERATE EPISODE OF MAJOR DEPRESSIVE DISORDER WITHOUT PRIOR EPISODE (H): ICD-10-CM

## 2020-12-02 DIAGNOSIS — F43.23 ADJUSTMENT DISORDER WITH MIXED ANXIETY AND DEPRESSED MOOD: ICD-10-CM

## 2020-12-04 ENCOUNTER — OFFICE VISIT - HEALTHEAST (OUTPATIENT)
Dept: BEHAVIORAL HEALTH | Facility: CLINIC | Age: 40
End: 2020-12-04

## 2020-12-04 DIAGNOSIS — F43.23 ADJUSTMENT DISORDER WITH MIXED ANXIETY AND DEPRESSED MOOD: ICD-10-CM

## 2020-12-09 ENCOUNTER — COMMUNICATION - HEALTHEAST (OUTPATIENT)
Dept: RHEUMATOLOGY | Facility: CLINIC | Age: 40
End: 2020-12-09

## 2020-12-09 DIAGNOSIS — M25.50 MULTIPLE JOINT PAIN: ICD-10-CM

## 2020-12-09 DIAGNOSIS — R20.2 PARESTHESIA: ICD-10-CM

## 2020-12-09 DIAGNOSIS — E55.9 VITAMIN D DEFICIENCY: ICD-10-CM

## 2020-12-10 RX ORDER — ERGOCALCIFEROL 1.25 MG/1
50000 CAPSULE ORAL
Qty: 12 CAPSULE | Refills: 0 | Status: SHIPPED | OUTPATIENT
Start: 2020-12-10 | End: 2022-04-19

## 2020-12-23 ENCOUNTER — COMMUNICATION - HEALTHEAST (OUTPATIENT)
Dept: BEHAVIORAL HEALTH | Facility: CLINIC | Age: 40
End: 2020-12-23

## 2020-12-23 ENCOUNTER — OFFICE VISIT - HEALTHEAST (OUTPATIENT)
Dept: BEHAVIORAL HEALTH | Facility: CLINIC | Age: 40
End: 2020-12-23

## 2020-12-23 DIAGNOSIS — F32.1 CURRENT MODERATE EPISODE OF MAJOR DEPRESSIVE DISORDER WITHOUT PRIOR EPISODE (H): ICD-10-CM

## 2020-12-29 ENCOUNTER — OFFICE VISIT - HEALTHEAST (OUTPATIENT)
Dept: BEHAVIORAL HEALTH | Facility: CLINIC | Age: 40
End: 2020-12-29

## 2020-12-29 ENCOUNTER — COMMUNICATION - HEALTHEAST (OUTPATIENT)
Dept: BEHAVIORAL HEALTH | Facility: CLINIC | Age: 40
End: 2020-12-29

## 2020-12-29 DIAGNOSIS — F32.1 CURRENT MODERATE EPISODE OF MAJOR DEPRESSIVE DISORDER WITHOUT PRIOR EPISODE (H): ICD-10-CM

## 2020-12-29 DIAGNOSIS — F06.4 ANXIETY DISORDER DUE TO MEDICAL CONDITION: ICD-10-CM

## 2020-12-29 DIAGNOSIS — M79.7 FIBROMYALGIA: ICD-10-CM

## 2020-12-29 ASSESSMENT — PATIENT HEALTH QUESTIONNAIRE - PHQ9: SUM OF ALL RESPONSES TO PHQ QUESTIONS 1-9: 17

## 2020-12-29 ASSESSMENT — ANXIETY QUESTIONNAIRES
7. FEELING AFRAID AS IF SOMETHING AWFUL MIGHT HAPPEN: MORE THAN HALF THE DAYS
IF YOU CHECKED OFF ANY PROBLEMS ON THIS QUESTIONNAIRE, HOW DIFFICULT HAVE THESE PROBLEMS MADE IT FOR YOU TO DO YOUR WORK, TAKE CARE OF THINGS AT HOME, OR GET ALONG WITH OTHER PEOPLE: EXTREMELY DIFFICULT
2. NOT BEING ABLE TO STOP OR CONTROL WORRYING: NEARLY EVERY DAY
4. TROUBLE RELAXING: NEARLY EVERY DAY
5. BEING SO RESTLESS THAT IT IS HARD TO SIT STILL: NOT AT ALL
6. BECOMING EASILY ANNOYED OR IRRITABLE: NEARLY EVERY DAY
1. FEELING NERVOUS, ANXIOUS, OR ON EDGE: NEARLY EVERY DAY
GAD7 TOTAL SCORE: 17
3. WORRYING TOO MUCH ABOUT DIFFERENT THINGS: NEARLY EVERY DAY

## 2021-01-15 ENCOUNTER — HEALTH MAINTENANCE LETTER (OUTPATIENT)
Age: 41
End: 2021-01-15

## 2021-01-18 ENCOUNTER — OFFICE VISIT - HEALTHEAST (OUTPATIENT)
Dept: BEHAVIORAL HEALTH | Facility: CLINIC | Age: 41
End: 2021-01-18

## 2021-01-18 DIAGNOSIS — F06.4 ANXIETY DISORDER DUE TO MEDICAL CONDITION: ICD-10-CM

## 2021-01-18 DIAGNOSIS — F32.1 CURRENT MODERATE EPISODE OF MAJOR DEPRESSIVE DISORDER WITHOUT PRIOR EPISODE (H): ICD-10-CM

## 2021-01-26 ENCOUNTER — COMMUNICATION - HEALTHEAST (OUTPATIENT)
Dept: FAMILY MEDICINE | Facility: CLINIC | Age: 41
End: 2021-01-26

## 2021-02-03 ENCOUNTER — AMBULATORY - HEALTHEAST (OUTPATIENT)
Dept: BEHAVIORAL HEALTH | Facility: CLINIC | Age: 41
End: 2021-02-03

## 2021-02-16 ENCOUNTER — AMBULATORY - HEALTHEAST (OUTPATIENT)
Dept: BEHAVIORAL HEALTH | Facility: CLINIC | Age: 41
End: 2021-02-16

## 2021-02-16 ENCOUNTER — OFFICE VISIT - HEALTHEAST (OUTPATIENT)
Dept: BEHAVIORAL HEALTH | Facility: CLINIC | Age: 41
End: 2021-02-16

## 2021-02-16 DIAGNOSIS — F41.9 ANXIETY DISORDER, UNSPECIFIED TYPE: ICD-10-CM

## 2021-02-16 DIAGNOSIS — F32.1 CURRENT MODERATE EPISODE OF MAJOR DEPRESSIVE DISORDER WITHOUT PRIOR EPISODE (H): ICD-10-CM

## 2021-02-16 ASSESSMENT — ANXIETY QUESTIONNAIRES
3. WORRYING TOO MUCH ABOUT DIFFERENT THINGS: SEVERAL DAYS
1. FEELING NERVOUS, ANXIOUS, OR ON EDGE: SEVERAL DAYS
5. BEING SO RESTLESS THAT IT IS HARD TO SIT STILL: NOT AT ALL
6. BECOMING EASILY ANNOYED OR IRRITABLE: NOT AT ALL
IF YOU CHECKED OFF ANY PROBLEMS ON THIS QUESTIONNAIRE, HOW DIFFICULT HAVE THESE PROBLEMS MADE IT FOR YOU TO DO YOUR WORK, TAKE CARE OF THINGS AT HOME, OR GET ALONG WITH OTHER PEOPLE: SOMEWHAT DIFFICULT
7. FEELING AFRAID AS IF SOMETHING AWFUL MIGHT HAPPEN: SEVERAL DAYS
4. TROUBLE RELAXING: NEARLY EVERY DAY
2. NOT BEING ABLE TO STOP OR CONTROL WORRYING: SEVERAL DAYS
GAD7 TOTAL SCORE: 7

## 2021-02-16 ASSESSMENT — PATIENT HEALTH QUESTIONNAIRE - PHQ9: SUM OF ALL RESPONSES TO PHQ QUESTIONS 1-9: 10

## 2021-02-23 ENCOUNTER — OFFICE VISIT - HEALTHEAST (OUTPATIENT)
Dept: BEHAVIORAL HEALTH | Facility: CLINIC | Age: 41
End: 2021-02-23

## 2021-02-23 DIAGNOSIS — F32.4 MAJOR DEPRESSIVE DISORDER WITH SINGLE EPISODE, IN PARTIAL REMISSION (H): ICD-10-CM

## 2021-02-23 DIAGNOSIS — F43.23 ADJUSTMENT DISORDER WITH MIXED ANXIETY AND DEPRESSED MOOD: ICD-10-CM

## 2021-02-23 DIAGNOSIS — M79.7 FIBROMYALGIA: ICD-10-CM

## 2021-02-23 DIAGNOSIS — F06.4 ANXIETY DISORDER DUE TO MEDICAL CONDITION: ICD-10-CM

## 2021-02-23 RX ORDER — GABAPENTIN 800 MG/1
800 TABLET ORAL 3 TIMES DAILY
Qty: 90 TABLET | Refills: 2 | Status: SHIPPED | OUTPATIENT
Start: 2021-02-23 | End: 2022-04-19

## 2021-02-23 RX ORDER — PROPRANOLOL HYDROCHLORIDE 10 MG/1
10 TABLET ORAL 3 TIMES DAILY PRN
Qty: 90 TABLET | Refills: 2 | Status: SHIPPED | OUTPATIENT
Start: 2021-02-23 | End: 2022-04-19

## 2021-02-23 RX ORDER — SERTRALINE HYDROCHLORIDE 100 MG/1
100 TABLET, FILM COATED ORAL DAILY
Qty: 30 TABLET | Refills: 2 | Status: SHIPPED | OUTPATIENT
Start: 2021-02-23 | End: 2022-04-19

## 2021-02-23 ASSESSMENT — ANXIETY QUESTIONNAIRES
3. WORRYING TOO MUCH ABOUT DIFFERENT THINGS: SEVERAL DAYS
2. NOT BEING ABLE TO STOP OR CONTROL WORRYING: SEVERAL DAYS
GAD7 TOTAL SCORE: 4
4. TROUBLE RELAXING: SEVERAL DAYS
1. FEELING NERVOUS, ANXIOUS, OR ON EDGE: NOT AT ALL
6. BECOMING EASILY ANNOYED OR IRRITABLE: NOT AT ALL
5. BEING SO RESTLESS THAT IT IS HARD TO SIT STILL: NOT AT ALL
IF YOU CHECKED OFF ANY PROBLEMS ON THIS QUESTIONNAIRE, HOW DIFFICULT HAVE THESE PROBLEMS MADE IT FOR YOU TO DO YOUR WORK, TAKE CARE OF THINGS AT HOME, OR GET ALONG WITH OTHER PEOPLE: SOMEWHAT DIFFICULT
7. FEELING AFRAID AS IF SOMETHING AWFUL MIGHT HAPPEN: SEVERAL DAYS

## 2021-02-23 ASSESSMENT — PATIENT HEALTH QUESTIONNAIRE - PHQ9: SUM OF ALL RESPONSES TO PHQ QUESTIONS 1-9: 8

## 2021-03-08 ENCOUNTER — COMMUNICATION - HEALTHEAST (OUTPATIENT)
Dept: FAMILY MEDICINE | Facility: CLINIC | Age: 41
End: 2021-03-08

## 2021-03-08 ENCOUNTER — OFFICE VISIT - HEALTHEAST (OUTPATIENT)
Dept: FAMILY MEDICINE | Facility: CLINIC | Age: 41
End: 2021-03-08

## 2021-03-08 DIAGNOSIS — I67.1 BRAIN ANEURYSM: ICD-10-CM

## 2021-03-08 DIAGNOSIS — R53.82 CHRONIC FATIGUE: ICD-10-CM

## 2021-03-08 DIAGNOSIS — M79.7 FIBROMYALGIA: ICD-10-CM

## 2021-03-08 DIAGNOSIS — R79.89 LOW VITAMIN D LEVEL: ICD-10-CM

## 2021-03-08 DIAGNOSIS — F43.22 ADJUSTMENT DISORDER WITH ANXIOUS MOOD: ICD-10-CM

## 2021-03-08 DIAGNOSIS — Z00.00 ENCOUNTER FOR GENERAL ADULT MEDICAL EXAMINATION WITHOUT ABNORMAL FINDINGS: ICD-10-CM

## 2021-03-08 LAB
CHOLEST SERPL-MCNC: 192 MG/DL
FASTING STATUS PATIENT QL REPORTED: NO
FASTING STATUS PATIENT QL REPORTED: YES
GLUCOSE BLD-MCNC: 71 MG/DL (ref 74–125)
HDLC SERPL-MCNC: 78 MG/DL
LDLC SERPL CALC-MCNC: 105 MG/DL
TRIGL SERPL-MCNC: 47 MG/DL

## 2021-03-08 ASSESSMENT — MIFFLIN-ST. JEOR: SCORE: 1306.68

## 2021-03-09 ENCOUNTER — COMMUNICATION - HEALTHEAST (OUTPATIENT)
Dept: SCHEDULING | Facility: CLINIC | Age: 41
End: 2021-03-09

## 2021-03-09 LAB
25(OH)D3 SERPL-MCNC: 53.1 NG/ML (ref 30–80)
25(OH)D3 SERPL-MCNC: 53.1 NG/ML (ref 30–80)

## 2021-05-26 VITALS — DIASTOLIC BLOOD PRESSURE: 60 MMHG | HEART RATE: 76 BPM | SYSTOLIC BLOOD PRESSURE: 88 MMHG

## 2021-05-26 ASSESSMENT — PATIENT HEALTH QUESTIONNAIRE - PHQ9
SUM OF ALL RESPONSES TO PHQ QUESTIONS 1-9: 15
SUM OF ALL RESPONSES TO PHQ QUESTIONS 1-9: 23

## 2021-05-27 ASSESSMENT — PATIENT HEALTH QUESTIONNAIRE - PHQ9
SUM OF ALL RESPONSES TO PHQ QUESTIONS 1-9: 12
SUM OF ALL RESPONSES TO PHQ QUESTIONS 1-9: 22
SUM OF ALL RESPONSES TO PHQ QUESTIONS 1-9: 10
SUM OF ALL RESPONSES TO PHQ QUESTIONS 1-9: 23
SUM OF ALL RESPONSES TO PHQ QUESTIONS 1-9: 19
SUM OF ALL RESPONSES TO PHQ QUESTIONS 1-9: 20
SUM OF ALL RESPONSES TO PHQ QUESTIONS 1-9: 17
SUM OF ALL RESPONSES TO PHQ QUESTIONS 1-9: 23
SUM OF ALL RESPONSES TO PHQ QUESTIONS 1-9: 22
SUM OF ALL RESPONSES TO PHQ QUESTIONS 1-9: 22
SUM OF ALL RESPONSES TO PHQ QUESTIONS 1-9: 8

## 2021-05-28 ASSESSMENT — ANXIETY QUESTIONNAIRES
GAD7 TOTAL SCORE: 7
GAD7 TOTAL SCORE: 20
GAD7 TOTAL SCORE: 21
GAD7 TOTAL SCORE: 4
GAD7 TOTAL SCORE: 20
GAD7 TOTAL SCORE: 12
GAD7 TOTAL SCORE: 16
GAD7 TOTAL SCORE: 19
GAD7 TOTAL SCORE: 17
GAD7 TOTAL SCORE: 19
GAD7 TOTAL SCORE: 20
GAD7 TOTAL SCORE: 21
GAD7 TOTAL SCORE: 18

## 2021-05-31 VITALS — WEIGHT: 147 LBS | HEIGHT: 66 IN | BODY MASS INDEX: 23.63 KG/M2

## 2021-06-04 VITALS
DIASTOLIC BLOOD PRESSURE: 68 MMHG | BODY MASS INDEX: 21.86 KG/M2 | WEIGHT: 136 LBS | HEART RATE: 72 BPM | HEIGHT: 66 IN | RESPIRATION RATE: 18 BRPM | SYSTOLIC BLOOD PRESSURE: 112 MMHG

## 2021-06-04 VITALS
BODY MASS INDEX: 21.53 KG/M2 | DIASTOLIC BLOOD PRESSURE: 80 MMHG | OXYGEN SATURATION: 100 % | SYSTOLIC BLOOD PRESSURE: 120 MMHG | HEART RATE: 78 BPM | HEIGHT: 66 IN | WEIGHT: 134 LBS

## 2021-06-04 VITALS
OXYGEN SATURATION: 100 % | BODY MASS INDEX: 21.86 KG/M2 | HEART RATE: 74 BPM | SYSTOLIC BLOOD PRESSURE: 110 MMHG | DIASTOLIC BLOOD PRESSURE: 74 MMHG | WEIGHT: 136 LBS | HEIGHT: 66 IN

## 2021-06-04 VITALS
HEART RATE: 93 BPM | OXYGEN SATURATION: 100 % | DIASTOLIC BLOOD PRESSURE: 85 MMHG | RESPIRATION RATE: 16 BRPM | SYSTOLIC BLOOD PRESSURE: 125 MMHG | BODY MASS INDEX: 22.11 KG/M2 | WEIGHT: 137 LBS | TEMPERATURE: 98.4 F

## 2021-06-04 VITALS — WEIGHT: 142 LBS | BODY MASS INDEX: 22.92 KG/M2

## 2021-06-04 NOTE — PROGRESS NOTES
Chief Complaint   Patient presents with     Dizziness     Nausea     other     weakness on right side and blurry vision started today     Leg Pain     right leg       ASSESSMENT & PLAN:   Diagnoses and all orders for this visit:    Lack of coordination    Numbness        MDM:  Patient describing what could be TIA symptoms - recommended ER transfer via ambulance as she arrived alone.  Patient without focal neuro deficit, but states her hand coordination was suddenly difficult with rt sided numbness and blurry vision.  Ambulance arrived and tx to [facility] for further eval.  No risk factors for stroke.  Differential is wide and includes complicated migraine, MS, anxiety, tumor.  Spoke with [facility] ER physician re: patient arrival.      Symptom onset was 4 hours prior to my eval.      SUBJECTIVE    HPI:  HPI  Anahi Stockton presents to the walk-in clinic with   Chief Complaint   Patient presents with     Dizziness     Nausea     other     weakness on right side and blurry vision started today     Leg Pain     right leg     Patient states she was at work today at about 11:00 and had difficulty controlling her right hand with the mouse at her desk and then noticed right-sided arm weakness and right-sided leg weakness subjectively.  When signing into walk-in clinic today, she had problems using the pen. Approximately 3 days ago, patient did note pins-and-needles feeling on the bottom of her right foot, but did not think much of it.    Does have a history of anxiety, but says she was not anxious at the time and has never had any type of weakness with anxiety.  No headaches, congestion, cough, fevers.      See ROS for additional symptoms and/or pertinent negatives.       History obtained from the patient.    No past medical history on file.    Active Ambulatory (Non-Hospital) Problems    Diagnosis     Allergic rhinitis     Depressive disorder       No family history on file.    Social History     Tobacco Use      Smoking status: Never Smoker     Smokeless tobacco: Never Used   Substance Use Topics     Alcohol use: Not on file       Review of Systems   Constitutional: Negative for fever.   HENT: Negative for congestion.    Respiratory: Negative for shortness of breath.    Cardiovascular: Negative for chest pain and palpitations.   Neurological: Positive for numbness. Negative for dizziness, syncope, speech difficulty, weakness, light-headedness and headaches.       OBJECTIVE    Vitals:    12/17/19 1437   BP: 125/85   Patient Site: Right Arm   Patient Position: Sitting   Cuff Size: Adult Regular   Pulse: 93   Resp: 16   Temp: 98.4  F (36.9  C)   TempSrc: Oral   SpO2: 100%   Weight: 137 lb (62.1 kg)       Physical Exam  Constitutional:       General: She is not in acute distress.     Appearance: She is well-developed.   HENT:      Right Ear: External ear normal.      Left Ear: External ear normal.   Eyes:      General:         Right eye: No discharge.         Left eye: No discharge.      Conjunctiva/sclera: Conjunctivae normal.   Pulmonary:      Effort: Pulmonary effort is normal.   Musculoskeletal: Normal range of motion.   Skin:     General: Skin is warm and dry.      Capillary Refill: Capillary refill takes less than 2 seconds.   Neurological:      General: No focal deficit present.      Mental Status: She is alert and oriented to person, place, and time.      Motor: No weakness.      Coordination: Coordination normal.   Psychiatric:         Behavior: Behavior normal.         Thought Content: Thought content normal.         Judgment: Judgment normal.      Comments: Shaking, appears anxious.         Labs:  Recent Results (from the past 240 hour(s))   HM2(CBC w/o Differential)   Result Value Ref Range    WBC 9.0 4.0 - 11.0 thou/uL    RBC 4.83 3.80 - 5.40 mill/uL    Hemoglobin 14.3 12.0 - 16.0 g/dL    Hematocrit 42.6 35.0 - 47.0 %    MCV 88 80 - 100 fL    MCH 29.5 27.0 - 34.0 pg    MCHC 33.5 32.0 - 36.0 g/dL    RDW 10.7 (L)  11.0 - 14.5 %    Platelets 245 140 - 440 thou/uL    MPV 8.6 7.0 - 10.0 fL   Comprehensive Metabolic Panel   Result Value Ref Range    Sodium 141 136 - 145 mmol/L    Potassium 3.7 3.5 - 5.0 mmol/L    Chloride 108 (H) 98 - 107 mmol/L    CO2 21 (L) 22 - 31 mmol/L    Anion Gap, Calculation 12 5 - 18 mmol/L    Glucose 98 70 - 125 mg/dL    BUN 14 8 - 22 mg/dL    Creatinine 0.74 0.60 - 1.10 mg/dL    GFR MDRD Af Amer >60 >60 mL/min/1.73m2    GFR MDRD Non Af Amer >60 >60 mL/min/1.73m2    Bilirubin, Total 0.5 0.0 - 1.0 mg/dL    Calcium 9.5 8.5 - 10.5 mg/dL    Protein, Total 8.2 (H) 6.0 - 8.0 g/dL    Albumin 4.5 3.5 - 5.0 g/dL    Alkaline Phosphatase 54 45 - 120 U/L    AST 15 0 - 40 U/L    ALT 15 0 - 45 U/L   Thyroid Cascade   Result Value Ref Range    TSH 2.32 0.30 - 5.00 uIU/mL   Vitamin D, Total (25-Hydroxy)   Result Value Ref Range    Vitamin D, Total (25-Hydroxy) 12.9 (L) 30.0 - 80.0 ng/mL   Vitamin B12   Result Value Ref Range    Vitamin B-12 599 213 - 816 pg/mL   Erythrocyte Sedimentation Rate   Result Value Ref Range    Sed Rate 8 0 - 20 mm/hr   C-Reactive Protein(CRP)   Result Value Ref Range    CRP <0.1 0.0 - 0.8 mg/dL   Antinuclear Antibody (OMID) Cascade   Result Value Ref Range    OMID Screen Bowie 0.3 <=2.9 U   Lyme Antibody Cascade   Result Value Ref Range    Lyme Antibody Cascade 0.47 <0.90 Index Value   Rheumatoid Factor Quant   Result Value Ref Range    Rheumatoid Factor Quantitative <15.0 0 - 30 IU/mL   CK Total   Result Value Ref Range    CK, Total 119 30 - 190 U/L         Radiology:    Cta Head    Result Date: 12/17/2019  EXAM: CTA HEAD LOCATION: [facility] DATE/TIME: 12/17/2019 7:35 PM INDICATION: Abnormal head MRA. Possible aneurysm. COMPARISON: None. CONTRAST: Iohexol (Omni) 75 mL. TECHNIQUE: Head CT angiogram with IV contrast. Noncontrast head CT followed by axial helical CT images of the intracranial vessels obtained during the arterial phase of intravenous contrast administration. Axial  helical 2D reconstructed images and multiplanar 3D MIP reconstructed images of the intracranial vessels were performed by the technologist. Dose reduction techniques were used. FINDINGS: NONCONTRAST HEAD CT: INTRACRANIAL CONTENTS: No intracranial hemorrhage, extraaxial collection, or mass effect.  No CT evidence of acute infarct. Normal parenchymal attenuation. Normal ventricles and sulci. VISUALIZED ORBITS/SINUSES/MASTOIDS: No intraorbital abnormality. No paranasal sinus mucosal disease. No middle ear or mastoid effusion. BONES/SOFT TISSUES: No acute abnormality. HEAD CTA: ANTERIOR CIRCULATION: 1.7 x 0.8 mm triangular prominence projecting cranially from the left MCA trifurcation visible on coronal MIP image 26 and sagittal MIP image 38. No definite vessel is seen arising from the tip of this prominence. This may be a tiny  aneurysm. No other lesion suspicious for an aneurysm. Essentially fetal posterior cerebral arteries bilaterally. A tiny left P1 segment is visualized. POSTERIOR CIRCULATION: No stenosis/occlusion, aneurysm, or high flow vascular malformation. Developmentally small vertebrobasilar system. DURAL VENOUS SINUSES: Expected enhancement of the major dural venous sinuses.     HEAD CT: 1.  Normal head CT. HEAD CTA: 1.  1.7 x 0.8 mm triangular prominence projecting cranially from the left MCA trifurcation. It is well visualized on coronal MIP image 26 and sagittal MIP image 38. No definite vessel arises from this prominence. This may be a tiny aneurysm. 2.  Continued follow-up is recommended. This can be done on an outpatient basis. 3.  The head CT is otherwise normal. 4.  Essentially fetal posterior cerebral arteries bilaterally. Findings were discussed with Dr. Chahal at 7:55 PM hours on 12/17/2019.    Mr Brain Cow Carotid With And Without Contrast    Result Date: 12/17/2019  EXAM: MR BRAIN COW CAROTID W WO CONTRAST LOCATION: [facility] DATE/TIME: 12/17/2019 5:57 PM INDICATION: Right sided  numbness COMPARISON: None. CONTRAST: Gadavist 6.2 TECHNIQUE: HEAD MRI: Routine multiplanar multisequence head MRI without and with intravenous contrast. HEAD MRA: 3D time-of-flight head MRA without intravenous contrast. NECK MRA: Neck MRA without and with IV contrast. Stenosis measurements made according to NASCET criteria unless otherwise specified. FINDINGS: HEAD MRI: INTRACRANIAL CONTENTS: No acute or subacute infarct. No mass, acute hemorrhage, or extra-axial fluid collections. Normal brain parenchymal signal. Normal ventricles and sulci. Normal position of the cerebellar tonsils. No pathologic contrast enhancement. SELLA: No abnormality accounting for technique. OSSEOUS STRUCTURES/SOFT TISSUES: Normal marrow signal. The major intracranial vascular flow voids are maintained. ORBITS: No abnormality accounting for technique. SINUSES/MASTOIDS: No paranasal sinus mucosal disease. No middle ear or mastoid effusion. HEAD MRA: ANTERIOR CIRCULATION: 1 x 2 mm aneurysm versus infundibulum in the left MCA trifurcation. No stenosis. Fetal origin of the right posterior cerebral artery from the anterior circulation. POSTERIOR CIRCULATION: No stenosis/occlusion, aneurysm, or high flow vascular malformation. Balanced vertebral arteries supply a normal basilar artery. NECK MRA: RIGHT CAROTID: No measurable stenosis or dissection. LEFT CAROTID: No measurable stenosis or dissection. VERTEBRAL ARTERIES: No focal stenosis or dissection. Balanced vertebral arteries. AORTIC ARCH: Classic aortic arch anatomy with no significant stenosis at the origin of the great vessels.     HEAD MRI: 1.  Normal head MRI. HEAD MRA: 1.  1 x 2 mm aneurysm versus infundibulum in the left MCA trifurcation. 2.  Further assessment with a head CTA is recommended as this may be able to discern between the 2. 3.  The head MRA is otherwise normal. 4.  NECK MRA: 5.  Normal neck MRA. Findings were discussed with Dr. Chahal at 6:54 PM hours on  12/17/2019.      PATIENT INSTRUCTIONS:   There are no Patient Instructions on file for this visit.

## 2021-06-04 NOTE — TELEPHONE ENCOUNTER
PATIENT NAME:  Anahi Stockton  YOB: 1980  MRN: [id number]  SURGEON: DR. 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    FOLLOW-UP:    Follow up with DR. WILLIAM next available in clinic to discuss MRA/CT results. No imaging needed  DISPOSITION:  HOME FROM ED    ADDITIONAL INSTRUCTIONS FOR MEDICAL STAFF:

## 2021-06-04 NOTE — PROGRESS NOTES
Office Visit   Anahi Stockton   39 y.o. female    Date of Visit: 12/20/2019    Chief Complaint   Patient presents with     Get established visit     ER follow up- [facility]- numbness, tingling, burning all over body        Assessment and Plan   1. Numbness  She notes that her numbness and weakness seems to be worsening.  She feels so weak that its hard for her to get up and go to work.  Is been going on for about a week.  She is not having any headache, fever, or other symptoms of infection.  Her initial testing in the ER was negative.  I think that this does require a more thorough evaluation and I will get autoimmune testing, vitamin levels, inflammatory markers, and thyroid function.  We will get back to her with the blood test results when they are available.  I am also going to refer her to neurology.  I did discuss with her that over the weekend if she starts worsening then the next step would be to return to the emergency department.  - HM2(CBC w/o Differential)  - Comprehensive Metabolic Panel  - Thyroid Cascade  - Vitamin D, Total (25-Hydroxy)  - Vitamin B12  - Erythrocyte Sedimentation Rate  - C-Reactive Protein(CRP)  - Antinuclear Antibody (OMID) Cascade  - Lyme Antibody Cascade  - Rheumatoid Factor Quant  - Ambulatory referral to Neurology    2. Generalized muscle weakness  - HM2(CBC w/o Differential)  - Comprehensive Metabolic Panel  - Thyroid Cascade  - Vitamin D, Total (25-Hydroxy)  - Vitamin B12  - Erythrocyte Sedimentation Rate  - C-Reactive Protein(CRP)  - Antinuclear Antibody (OMID) Cascade  - Lyme Antibody Cascade  - Rheumatoid Factor Quant  - Ambulatory referral to Neurology    3. Fatigue, unspecified type       Return if symptoms worsen or fail to improve.     History of Present Illness   This 39 y.o. old female comes in to discuss new symptoms.  She has been quite healthy until about a week or so ago.  She exercises and has no chronic medical problems.  Over the past week she has developed  "weakness and fatigue.  She has numbness that seems to come and go in her legs and arms.  States that this been a little difficult to get out of bed at times.  She has not noted fevers but has some muscle aches.  She has not had any other symptoms of infection such as cough, sore throat or headache.  She went into the emergency department earlier this week and had an MRI that was negative other than a very small berry aneurysm.  She also had a CBC and a chemistry panel that was negative.  Since that ER visit she feels that her symptoms are worsening.  She has not been to work for 3 days.  She has no other acute concerns today.    Review of Systems: As above, systems otherwise reviewed and negative.     Medications, Allergies and Problem List   Patient Active Problem List   Diagnosis     Allergic rhinitis     Depressive disorder     Current Outpatient Medications   Medication Sig Dispense Refill     multivitamin therapeutic tablet Take 1 tablet by mouth daily.       No current facility-administered medications for this visit.      No Known Allergies       Physical Exam     /80 (Patient Site: Right Arm, Patient Position: Sitting)   Pulse 78   Ht 5' 6\" (1.676 m)   Wt 134 lb (60.8 kg)   LMP 12/03/2019   SpO2 100%   BMI 21.63 kg/m      General:  Patient is alert and in no apparent distress.  Neck:  Supple with no adenopathy or thyroid abnormality noted.  Cardiovascular:  Regular rate and rhythm, normal S1/S2, no murmurs, rubs, or gallop.  Pulmonary:  Lungs are clear to auscultation bilaterally with normal respiratory effort.  Gastrointestinal:  Abdomen is soft, non-tender, non-distended, with no organomegaly, rebound or guarding.  Neurologic Cranial nerves are intact.  Her reflexes are brisk and symmetric.  She has some generalized weakness of her arms and legs but ambulates without difficulty.  No focal neurologic deficits are noted.         Additional Information   Social History     Tobacco Use     Smoking " status: Never Smoker     Smokeless tobacco: Never Used   Substance Use Topics     Alcohol use: Not on file     Drug use: Not on file          Angela Dewitt MD

## 2021-06-04 NOTE — TELEPHONE ENCOUNTER
Please let her know that I can extend her time off through the week but would want her to start back next week at half time for a week and then full time.  This is if they don't find a major cause of her symptoms.  I did not find any signs of inflammatory arthritis when I evaluated her symptoms.  We'll await the Neuro appointment.  Thanks.

## 2021-06-04 NOTE — PROGRESS NOTES
Please call and let her know that the additional test for signs of muscle inflammation was normal.  Thanks.

## 2021-06-04 NOTE — PROGRESS NOTES
Office Visit   Anhai Stockton   39 y.o. female    Date of Visit: 12/27/2019    Chief Complaint   Patient presents with     Follow-up     Numbness/weakness in arms getting worse        Assessment and Plan   1. Tingling  She feels that the right arm tingling is worsening but the muscle weakness is the same as a week ago.  I am going to try to get into contact with neurology to get her in sooner for further evaluation.  We did review her labs which were essentially normal other than her low vitamin D.    2. Muscle weakness (generalized)         ADDENDUM:  I spoke with Neurology and she will be put on a cancellation list to hopefully have her seen earlier.  In addition, I will have her get a CK drawn.  She will present to the ED if her symptoms worsen in the meantime.  She's in agreement with this plan.      No follow-ups on file.     History of Present Illness   This 39 y.o. old female comes in to follow-up.  I saw her a week ago for the first time after she had developed right arm and leg numbness and weakness.  She had been to the emergency department where an MRI showed a small berry aneurysm but no other abnormalities.  We did significant blood work and did not find any abnormalities other than a low vitamin D.  She comes today to follow-up.  States she is the same as a week ago as far as her weakness.  She does not feel that she be able to go to work.  Her numbness on her arm is worsened.  She also notes some blurred vision.  She has no other new acute symptoms.  She is quite concerned and does not have a neurology evaluation until the end of January.    Review of Systems: As above, systems otherwise reviewed and negative.     Medications, Allergies and Problem List   Patient Active Problem List   Diagnosis     Allergic rhinitis     Depressive disorder     Current Outpatient Medications   Medication Sig Dispense Refill     cod liver oil Oil Take by mouth.       multivitamin therapeutic tablet Take 1 tablet by  "mouth daily.       No current facility-administered medications for this visit.      No Known Allergies       Physical Exam     /74 (Patient Site: Right Arm, Patient Position: Sitting)   Pulse 74   Ht 5' 6\" (1.676 m)   Wt 136 lb (61.7 kg)   LMP 12/03/2019   SpO2 100%   BMI 21.95 kg/m      General: This is an alert female, tearful at times, no apparent distress.  Neuro:  Leg and arm strength 5/5 bilaterally.  No focal deficits.       Additional Information   Social History     Tobacco Use     Smoking status: Never Smoker     Smokeless tobacco: Never Used   Substance Use Topics     Alcohol use: Not on file     Drug use: Not on file          Angela Dewitt MD    "

## 2021-06-04 NOTE — TELEPHONE ENCOUNTER
Who is calling:  Patient   Reason for Call:  Patient states she was seen by provider 12/27/19 patient states she is off for work short term disability for 3 weeks her employer is going to contact provider for medical  Information she is off until 01/06/19  She is still experiencing same symptoms patient is requesting provider to extend her time off until she see Neurology doctor and find out what's going on  her appointment is  This coming Thursday 01/09/19 . Patient also questioning her arthritis can be causing due to the symptoms she is having . Please advise.  Date of last appointment with primary care: 12/27/19  Okay to leave a detailed message: No

## 2021-06-05 VITALS
OXYGEN SATURATION: 99 % | DIASTOLIC BLOOD PRESSURE: 66 MMHG | HEIGHT: 66 IN | WEIGHT: 139.75 LBS | BODY MASS INDEX: 22.46 KG/M2 | RESPIRATION RATE: 16 BRPM | HEART RATE: 60 BPM | TEMPERATURE: 98.1 F | SYSTOLIC BLOOD PRESSURE: 106 MMHG

## 2021-06-05 NOTE — TELEPHONE ENCOUNTER
Called pt. She states that she is normally scheduled to work 25 hours a week on a normal schedule but they have been having her work almost 40 hours a week. Just a FYI. He work will be contacting us.

## 2021-06-05 NOTE — TELEPHONE ENCOUNTER
I do not know.  I ordered Neurology at her first visit, not Neurosurgery.  When I saw her on 12/27, I contacted Neurology and discussed her situation with them and they agreed to put her on a cancellation list to get her in sooner.  I do not know what transpired after that or how that was changed to Neurosurgery.  Is there a way to look in to who actually scheduled the appointment?  Please do if you can.  Thanks.

## 2021-06-05 NOTE — PATIENT INSTRUCTIONS - HE
MRI/MRA in 1 year prior to appt in clinic with Dr. Witt  Appointment at [facility] with Dr. Murphy on 1/31/2020  Referral to psychiatry

## 2021-06-05 NOTE — PROGRESS NOTES
"Neurosurgery consultation was requested by: Dr. Dewitt for evaluation of incidental left MCA 1mm aneurysm  Patient was presented to [facility] ED on 12/17/2019 with nausea/vomiting/dizziness as well as right sided paresthesias.  Today she presents with a chief complaint of \"pins and needles \" in her right arm and leg. Gets seldom N/V. She is dizzy at times. Denies HA. Speech is fluent. Cognition is intact.  Ana Laura Gamble RN, CNRN    "

## 2021-06-05 NOTE — TELEPHONE ENCOUNTER
Who is requesting the letter?  Patient  Why is the letter needed? For work  How would you like this letter returned? Patient stated that she is not ready to work her full days as she is still experiencing numbness and tingling. Patient stated that she would like to extend the half days until after she sees her neurologist. Patient stated that she has a appointment scheduled with Angela Dewitt MD to discuss this but would need this letter before 1/22/2020. Patient stated that the clinic was able to email the letter to her last time and would like this emailed to [email or web address] or if unable to please mail to her home address. Patient would like to be notified when this is done.  Okay to leave a detailed message? Yes  [phone]

## 2021-06-05 NOTE — TELEPHONE ENCOUNTER
Spoke with the patient and let her know that all notes and the referral have been faxed to [email or web address] as requested.  She verbalized understanding and had no further questions at this time.  Jocelyn CRANE CMA/RENA....................2:17 PM

## 2021-06-05 NOTE — TELEPHONE ENCOUNTER
Do you have the requested forms? Left message to ask more details on what kind of forms these are if you are not aware.    Mary White, CMA

## 2021-06-05 NOTE — TELEPHONE ENCOUNTER
Please confirm what she's looking for.  I do not have forms unless they've been put on my desk today.  JASPREET

## 2021-06-05 NOTE — TELEPHONE ENCOUNTER
Left message to call back for: Amal  Information to relay to patient:  We are emailing the letter to pt now.

## 2021-06-05 NOTE — PROGRESS NOTES
"NEUROSURGERY CONSULTATION NOTE    1/9/2020       CHIEF COMPLAINT: Right arm and leg tingling and weakness     HPI:    Anahi Stockton is a 39 y.o. female who is sent to us in consultation by Angela Dewitt MD, for evaluation of right arm and leg tingling and weakness.       Onset: Her symptoms started \"a few days\" prior to 12/17/2019.     Pain: The patient reports experiencing intermittent right knee numbness, intermittent right hand numbness, constant bilateral arm and leg weakness (right more than left in both arms and legs), and bilateral feet numbness at night for the past month. She notes that on 12/17/2019, she experienced sudden onset dizziness, nausea and right hand numbness which prompted her to go to the emergency department. That emergency department visit found a 1 x 2 mm in the left MCA trifurcation by MRI but, every other workup that was performed was unremarkable. Currently, she is experiencing bilateral feet numbness and right hand numbness. She has been having difficulty with performing everyday tasks such as showering, writing, or opening a door due to her symptoms. Additionally, she endorses \"blue colored\" bilateral feet when only she steps out of the shower, right thumb pain for the past year, \"weird sensation\" in her right jaw, right eye blurred vision (which has since resolved), fatigue, nausea, lightheadedness, \"bumps\" on her bilateral feet and anxiety due to her symptoms. Due to her symptoms, she has not been able to go to work. She is right-handed. She denies any recent travel. She reports having cold symptoms that began at the end of October which resolved a month after the onset. She denies any family medical history of ALS. She denies difficulty swallowing, facial numbness, or numbness or tingling in her torso.     Aggravating factors: Her bilateral feet numbness is provoked at night while she is laying down. Her bilateral \"blue colored\" feet are provoke by a shower.  Alleviating " "factors: None.    Past Medical History:   Diagnosis Date     Aneurysm (H)      Depression      History reviewed. No pertinent surgical history.      REVIEW OF SYSTEMS:  Pt denies difficulty swallowing, facial numbness, or numbness or tingling in her torso. Pt notes + intermittent right knee numbness, intermittent right hand numbness, constant bilateral arm and leg weakness (right more than left in both arms and legs), bilateral feet numbness at night for the past month, \"blue colored\" bilateral feet when only she steps out of the shower, right thumb pain for the past year, \"weird sensation\" in her right jaw, right eye blurred vision (which has since resolved), fatigue, nausea, lightheadedness, \"bumps\" on her bilateral feet and anxiety due to her symptoms. A full 14 point review of systems was otherwise completed and is negative aside from that mentioned above in the HPI    MEDICATIONS:    Current Outpatient Medications   Medication Sig Dispense Refill     cod liver oil Oil Take by mouth.       multivitamin therapeutic tablet Take 1 tablet by mouth daily.       No current facility-administered medications for this visit.          ALLERGIES/SENSITIVITIES:     No Known Allergies    PERTINENT SOCIAL HISTORY:   Social History     Socioeconomic History     Marital status:      Spouse name: None     Number of children: 2     Years of education: None     Highest education level: None   Occupational History     Occupation:    Social Needs     Financial resource strain: None     Food insecurity:     Worry: None     Inability: None     Transportation needs:     Medical: None     Non-medical: None   Tobacco Use     Smoking status: Never Smoker     Smokeless tobacco: Never Used   Substance and Sexual Activity     Alcohol use: Not Currently     Drug use: None     Sexual activity: Yes     Partners: Male   Lifestyle     Physical activity:     Days per week: None     Minutes per session: None     Stress: None " "  Relationships     Social connections:     Talks on phone: None     Gets together: None     Attends Baptism service: None     Active member of club or organization: None     Attends meetings of clubs or organizations: None     Relationship status: None     Intimate partner violence:     Fear of current or ex partner: None     Emotionally abused: None     Physically abused: None     Forced sexual activity: None   Other Topics Concern     None   Social History Narrative     None         FAMILY HISTORY:  Family History   Problem Relation Age of Onset     Hypertension Mother      Diabetes Father      Stroke Father      Hypertension Father         PHYSICAL EXAM:     Constitution: /68   Pulse 72   Resp 18   Ht 5' 6\" (1.676 m)   Wt 136 lb (61.7 kg)   BMI 21.95 kg/m  .   Awake, alert and in NAD  Eyes: Conjugate gaze. Conjunctiva benign without icterus or injection  Heart: RRR  Lungs: Non-labored respiration without accessory muscle use  Skin: No obvious rash or lesion  Psych: Appropriate mood and affect, alert and oriented x 3  Mental Status:  Speech is fluent.  Recent and remote memory are intact.  Attention span and concentration are normal.     Cranial Nerves:  CN2: No funduscopic exam performed. CN3,4 & 6: Pupillary light response, lateral and vertical gaze normal.  No nystagmus.  Visual fields are full to confrontation. CN5: Intact to touch CN7: No facial weakness, smile, facial symmetry intact. CN8: Intact to spoken voice. CN9&10: Gag reflex, uvula midline, palate rises with phonation. CN11: Shoulder shrug 5/5 intact bilaterally. CN12: Tongue midline and moves freely from side to side.     Motor: No pronator drift of upper extremity. Normal bulk and tone all muscle groups of upper and lower extremities. Strength is 5/5 in all muscle groups of the bilateral upper and lower extremities      Sensory: Sensation intact bilaterally to light touch and temperature throughout. Sensation is intact to pinprick in " the bilateral LE. Vibratory sense is intact in the bl great toe.     Coordination:  Gait is WNL. Pt is able to heel and toe walk without difficulty. Mild incoordination with reverse tandem gait. JOSEPH in the UE is WNL.      Reflexes; 2+ supinator, biceps, triceps in the right arm. 3+ biceps, supinator and triceps in the left arm. 2+ in bilateral patellar and achilles. No hunter. No clonus. Toes are down-going bilaterally    IMAGING: I personally reviewed all radiographic images    Brain MRI 12/17/2018: There is a small cranially projecting 2 mm aneurysm at the MCA trifurcation, this appears to be confirmed by CTA.  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  Given its size, I recommended routine maintenance imaging with repeat MRA in 1 years time.    Ms. Stockton is expectantly much more concerned with her symptoms of numbness, tingling, weakness.  There is no abnormality on her brain MRI and there are no focal findings on examination.  I recommended that she keep her appointment with the [facility] for complete neurology evaluation.  Additionally noted that given her anxiety about this problem, I recommended that she see psychiatry for further evaluation and recommendations.  While it is possible that her symptoms could be somatic, this will also be a diagnosis of exclusion, but regardless her perceived decreased level of function with activities of daily living and ability to care for her family is going to be elevating her stress and anxiety and an outside source to help her manage this would be greatly beneficial.    I spent more than 45 minutes in this apt, examining the pt, reviewing the scans, reviewing notes from jennyfer  t, discussing treatment options with risks and benefits and coordinating care. >50 % clinic time was spent in face to face counseling and coordinating care    Ai Witt MD       Cc:   Angela Dewitt MD    I, Pantera Estevez, am serving as a scribe to document services personally performed by Dr. Ai Witt MD, based on my observation and the provider's statements to me. I, Ai Witt MD attest that Pantera Estevez is acting in a scribe capacity, has observed my performance of the services and has documented them in accordance with my direction.

## 2021-06-05 NOTE — TELEPHONE ENCOUNTER
Called and spoke with patient and she states it is all taken care of. She needed records sent to insurance company. This has been done and received.    Mary White, CMA

## 2021-06-05 NOTE — TELEPHONE ENCOUNTER
Emailed letter to pt at [email or web address].     Pt saw a neurosurgeon today. She thought she was seeing a neurologist which was originally scheduled for 1/31 but pt wanted the appt moved up due to increasing symptoms. She is confused. Why did she see a neurosurgeon? Please advise.

## 2021-06-05 NOTE — TELEPHONE ENCOUNTER
PT STOPPED BY AND WOULD LIKE A LETTER FROM DR MENDOZA SAYING SHE HAS BEEN OFF WORK STARTING 12/17/19 DUE TO ILLNESS AND CAN RETURN TO WORK NEXT WEEK 1/15/2020 ON HALF DAYS. PLEASE MAIL LETTER TO PT ADDRESS. PLEASE CALL PT WHEN READY

## 2021-06-05 NOTE — TELEPHONE ENCOUNTER
Forms Request  Name of form/paperwork: Other:  office visit forms from the doctor  Have you been seen for this request: N/A  Do we have the form: yes office visit   When is form needed by: as soon as possible   How would you like the form returned: Fax:  N/A  Patient Notified form requests are processed in 3-5 business days: Yes    Okay to leave a detailed message? Yes  [phone]

## 2021-06-05 NOTE — TELEPHONE ENCOUNTER
Neurosurgery also followed up with patient on 12/27 by phone and scheduled her with Dr. Witt      After reviewing the pts chart the ED note from 12/17 this is what the ED provider said:    We have paged neurosurgery to discuss this with them.  8:33 PM I rechecked and updated the patient after speaking to neuro surgery, Dr Cabrera.  Neurosurgery agrees that the incidental 1 mm aneurysm is not the cause of her paresthesias.  They note that they typically follow this up with imaging once a year.  These results communicated to the patient and patient discharged home with referral phone number for neurosurgery.  They are in agreement with the plan.

## 2021-06-05 NOTE — TELEPHONE ENCOUNTER
No Dr Witt told the patient to keep the appointment with Dr Murphy on 01/31 @ 10:30am. Just verified with [facility] also

## 2021-06-05 NOTE — TELEPHONE ENCOUNTER
Do you know if the appointment with the neurologist got cancelled on 1/31 then? I still see it on our records. Thanks

## 2021-06-05 NOTE — TELEPHONE ENCOUNTER
New Appointment Needed  What is the reason for the visit:    Biometrics screening. Patient stated she has had this done by a nurse in the past. She stated she doesn't need a physical.   Provider Preference: Any available, patient would like to be seen at the [facility]  How soon do you need to be seen?: Asap, patient needs a Monday or a Friday between 8-9am  Waitlist offered?: No  Okay to leave a detailed message:  Yes

## 2021-06-05 NOTE — TELEPHONE ENCOUNTER
RN Assessment/Reason for Call:   Okay to leave Detailed Message  Patient calling in, needs notes ER & Dr notes ; referral to neurologist. to fax to [facility] fax [phone].  EPIC note   Please let her know that I can extend her time off through the week but would want her to start back next week at half time for a week and then full time.  This is if they don't find a major cause of her symptoms.  I did not find any signs of inflammatory arthritis when I evaluated her symptoms.  We'll await the Neuro appointment.  Thanks.    RN Action/Disposition:  Call back if worse symptoms  Discussed home care measures.  Agrees to plan.     Yolanda Bose RN    [facility] Triage/med refill  1/6/2020  1:43 PM

## 2021-06-07 NOTE — PROGRESS NOTES
Anahi Stockton is a 39 y.o. female who is being evaluated via a billable video visit.        Video visit   Anahi Stockton   39 y.o. female    Date of Visit: 5/4/2020    Chief Complaint   Patient presents with     Follow-up     Discuss findings from neurology     Anxiety        Assessment and Plan   1. Myalgia  She has been having significant muscle aches, numbness, weakness and fatigue for some time now.  She has had extensive work-up including MRI of the brain and neck.  She has had blood work that has included vitamin levels, inflammatory markers, autoimmune disease and kidney and liver function.  Lyme testing was negative.  All of her tests have been negative.  She was recently evaluated in outpatient neurology and felt to have fibromyalgia.  We have discussed this in detail today.  She does wonder if perhaps this could be COVID.  I think it be worth testing and I am going to order antibody test.  In addition I am going to start her on duloxetine for her fibromyalgia symptoms.  Discussed that she will need to stop the escitalopram.  We will have a recheck visit in 2 weeks.  I discussed potential side effects.  - COVID-19 Virus Antibody; Future  - DULoxetine (CYMBALTA) 20 MG capsule; Take 1 capsule (20 mg total) by mouth 2 (two) times a day.  Dispense: 60 capsule; Refill: 2    2. Anxiety  She is feeling much more anxious.  She feels like it is contributing to her symptoms.  She has done therapy in the past and is interested in starting therapy again.  We will go ahead and set that up.  - AMB REFERRAL TO MENTAL HEALTH AND ADDICTION  - Adult (18+); Outpatient Treatment; Individual/Couples/Family/Group Therapy/Health Psychology; [facility] [phone]; We will contact you to schedule the appointment or ple...    3. Hx of viral illness  - COVID-19 Virus Antibody; Future       Return in about 2 weeks (around 5/18/2020) for Video Visit.     History of Present Illness   This 39 y.o. old female  was evaluated with video visit today.  She has had significant symptoms of numbness tingling, fatigue, myalgias and just general unwell symptoms for several months.  It all started with a unidentified viral upper respiratory illness.  She had a scratchy throat and a cough and a sore throat at the beginning of her symptoms.  She has had extensive evaluation of her symptoms of numbness, tingling, weakness and fatigue.  The testing including MRIs and blood work has been unremarkable.  She was recently diagnosed with fibromyalgia by neurology.  She is on citalopram which has not been helpful.  We discussed options today and she is interested in trying duloxetine which I think could be very helpful.  She also is having increased anxiety with this and would like to see a therapist.  She has no new concerns today.    Review of Systems: As above, systems otherwise reviewed and negative.     Medications, Allergies and Problem List   Patient Active Problem List   Diagnosis     Allergic rhinitis     Depressive disorder     Current Outpatient Medications   Medication Sig Dispense Refill     cholecalciferol, vitamin D3, 1,000 unit (25 mcg) tablet Take 1,000 Units by mouth 3 (three) times a week.       citalopram (CELEXA) 10 MG tablet Take 10 mg by mouth daily.       FERROCITE 324 mg (106 mg iron) Tab Take 1 tablet by mouth daily.       multivitamin therapeutic tablet Take 1 tablet by mouth daily.       DULoxetine (CYMBALTA) 20 MG capsule Take 1 capsule (20 mg total) by mouth 2 (two) times a day. 60 capsule 2     No current facility-administered medications for this visit.      No Known Allergies       Physical Exam     There were no vitals taken for this visit.    This is an alert female, sitting comfortably, no apparent distress.     Additional Information   Social History     Tobacco Use     Smoking status: Never Smoker     Smokeless tobacco: Never Used   Substance Use Topics     Alcohol use: Not Currently     Drug use: Not on  "file            Angela Dewitt MD      The patient has been notified of following:     \"This video visit will be conducted via a call between you and your physician/provider. We have found that certain health care needs can be provided without the need for an in-person physical exam.  This service lets us provide the care you need with a video conversation.  If a prescription is necessary we can send it directly to your pharmacy.  If lab work is needed we can place an order for that and you can then stop by our lab to have the test done at a later time.    Video visits are billed at different rates depending on your insurance coverage. Please reach out to your insurance provider with any questions.    If during the course of the call the physician/provider feels a video visit is not appropriate, you will not be charged for this service.\"    Patient has given verbal consent to a Video visit? Yes    Patient would like to receive their AVS by AVS Preference: Yuli.    Patient would like the video invitation sent by: Text to cell phone: [phone]    Will anyone else be joining your video visit? No        Video Start Time: 1:03 pm        Video-Visit Details    Type of service:  Video Visit    Video End Time (time video stopped): 1:14 pm  Originating Location (pt. Location): Home    Distant Location (provider location):  [facility] INTERNAL MEDICINE     Platform used for Video Visit: [facility]      Angela Dewitt MD  "

## 2021-06-08 NOTE — TELEPHONE ENCOUNTER
Spoke with the patient and let her know that a team would be calling to schedule her testing.  They would be able to answer her questions better than we can here.  She will want to ask them about outdoor testing when she is contacted.  Tried to reassure the patient that the antibody testing is only being done during certain times to maintain a controlled environment.  Patient verbalized understanding and had no further questions at this time.  Jocelyn CRANE CMA/RENA....................9:26 AM

## 2021-06-08 NOTE — TELEPHONE ENCOUNTER
Patient would like a copy of the office visit note.  She said that she's not able to print off the summary from My Chart and she would prefer the actual office visit note instead.    She said that she has tried previously with obtain office notes thru Medical records, but it takes to long.    If able to send a copy, please call the patient and she will provide fax number.    Anahi @ [phone]

## 2021-06-08 NOTE — PROGRESS NOTES
Anahi Stockton is a 40 y.o. female who is being evaluated via a billable video visit.        Video visit   Anahi Stockton   40 y.o. female    Date of Visit: 5/18/2020    Chief Complaint   Patient presents with     Follow-up     Nausea and depression        Assessment and Plan   1. Depressive disorder  She has significant symptoms and depression.  She has an appointment this week with a therapist.  We did change her from citalopram to duloxetine about a week ago due to some symptoms of numbness, myalgia and fatigue.  It was thought that perhaps this is fibromyalgia.  She has an appointment scheduled in June with rheumatology.  She has not been at work now for several months.  She wants me to extend her time off of work.  I did discuss the importance of her getting back to work as soon as possible.  I am going to extend it through June but I think that she needs to see a psychiatrist in the meantime.  I also reiterated the importance of her seeing rheumatology as scheduled in June.  I will schedule a follow-up video visit with her in 3 to 4 weeks so that we can reassess and determine next steps.  My plan would be to have her try to go back to work part-time in July.  - Ambulatory referral to Psychiatry    2. Numbness  As above she is seeing rheumatology.  Her symptoms are of unclear etiology.  She has not seen improvement yet with duloxetine but hopefully will soon.    3. Fatigue, unspecified type       No follow-ups on file.     History of Present Illness   This 40 y.o. old female is evaluated with a video visit today.  She has had significant symptoms of numbness, weakness, fatigue and myalgias since December.  Her symptoms came on pretty suddenly.  She has had extensive evaluation with no obvious cause.  She saw neurology in the last couple of months and they did not find any cause of her symptoms.  It is thought that maybe this is fibromyalgia.  She is also having worsening depression and anxiety.  I had a video  "visit with her a couple weeks ago and we decided to change her medication to duloxetine.  She has not really noticed any improvement yet.  She has some mild nausea with it.  She has an appointment with a therapist this week.  She has an appointment in June with a rheumatologist.  She has not been at work and is interested in extending her time away.  We had a long discussion about the importance of her getting back to work as soon as possible.  I will go ahead and continue her time off through June but in the meantime it is imperative that she see rheumatology and I am also going to order a consultation with psychiatry.    Review of Systems: As above, systems otherwise reviewed and negative.     Medications, Allergies and Problem List   Patient Active Problem List   Diagnosis     Allergic rhinitis     Depressive disorder     Current Outpatient Medications   Medication Sig Dispense Refill     cholecalciferol, vitamin D3, 1,000 unit (25 mcg) tablet Take 1,000 Units by mouth 3 (three) times a week.       DULoxetine (CYMBALTA) 20 MG capsule Take 1 capsule (20 mg total) by mouth 2 (two) times a day. 60 capsule 2     FERROCITE 324 mg (106 mg iron) Tab Take 1 tablet by mouth daily.       multivitamin therapeutic tablet Take 1 tablet by mouth daily.       No current facility-administered medications for this visit.      No Known Allergies       Physical Exam     There were no vitals taken for this visit.    This is an alert female, sitting comfortably, no apparent distress.     Additional Information   Social History     Tobacco Use     Smoking status: Never Smoker     Smokeless tobacco: Never Used   Substance Use Topics     Alcohol use: Not Currently     Drug use: Not on file            Angela Dewitt MD      The patient has been notified of following:     \"This video visit will be conducted via a call between you and your physician/provider. We have found that certain health care needs can be provided without the need " "for an in-person physical exam.  This service lets us provide the care you need with a video conversation.  If a prescription is necessary we can send it directly to your pharmacy.  If lab work is needed we can place an order for that and you can then stop by our lab to have the test done at a later time.    Video visits are billed at different rates depending on your insurance coverage. Please reach out to your insurance provider with any questions.    If during the course of the call the physician/provider feels a video visit is not appropriate, you will not be charged for this service.\"    Patient has given verbal consent to a Video visit? Yes    Patient would like to receive their AVS by AVS Preference: Exterity.    Patient would like the video invitation sent by: Text to cell phone: [phone]    Will anyone else be joining your video visit? No        Video Start Time: 7:55 am          Video-Visit Details    Type of service:  Video Visit    Video End Time (time video stopped): 8:15 am  Originating Location (pt. Location): Home    Distant Location (provider location):  [facility] INTERNAL MEDICINE     Platform used for Video Visit: Karina Dewitt MD  "

## 2021-06-08 NOTE — PATIENT INSTRUCTIONS - HE
"1. START duloxine 60mg daily.  2. Continue other medications as prescribed  3. Have your pharmacy contact us for a refill if you are running low on medications (We may ask you to come into clinic to get a refill from the nurse)  4. No alcohol or drug use  5. No driving if sedated  6. Contact the clinic with any questions or concerns   a. Phone: [phone]  b. Fax: [phone]  7. Reach out for help if you feel like hurting yourself or others:   a. [facility]: [address] [place] [address], [address] (phone: [phone])  b. [facility] Suicide Hotline: [phone]   c. Crisis Texting Line: Text \"[place]\" to 827850  d. Call 911 or go to nearest Emergency room   8. Follow up as directed in 2 weeks, for your appointments, per your After Visit Summary Form    "

## 2021-06-08 NOTE — TELEPHONE ENCOUNTER
----- Message from Butch Birmingham DO sent at 6/9/2020  7:10 PM CDT -----  Regarding: RE: Pt requsting for a letter to take time off work  Thank you for the message.  Please see comments in my note regarding discussion for a letter to work for time off.  Patient was made aware to contact her primary care physician for this purpose as we cannot make such a recommendation.    If desires we can just make a request stating she was seen by us and that they consider providing some time off until next follow-up visit with us for reassessment, due to her symptoms while work-up is in progress and to monitor response to treatment plan.  If patient desires such letter, please compose for me to review and consider cosigning.  ----- Message -----  From: Zaira Godinez CMA  Sent: 6/9/2020  11:00 AM CDT  To: Butch Birmingham DO  Subject: Pt requsting for a letter to take time off w#    Call patient to schedule her appt and lab. Follow up appt scheduled she will give us a call back when she is ready to have her lab done. Patient is requesting a letter to her work regarding taking time off for 6 month patient states she spoke with you during her video visit this morning.

## 2021-06-08 NOTE — TELEPHONE ENCOUNTER
Call patient this morning left a l for her to call back   Per Dr. PACKER    Patient requested letter for employer for time off from work due to symptoms.  Made aware that this should come from her PCP as currently does not have known autoimmune/connective tissue disease.  Made aware that unfortunately we do not have any objective findings for autoimmune/connective tissue disease at this time and if desires we can provide just a letter mentioning she  requests time off from work due to symptoms being worked up/meds initiated and to consider accommodating so she can better recuperate.  However, patient desired letter stating recommending time off work, which unfortunately we cannot provide such statement at this time.

## 2021-06-08 NOTE — PATIENT INSTRUCTIONS - HE
Summary of Your Rheumatology Visit    Next Appointment:   6 weeks    Medications:     Please follow directives on pill bottle on how to take medication(s) provided.    Recommend trying Tylenol 500-1000 mg twice a day if necessary for pain relief.      Referrals:     Pain clinic    Tests:      Please have labs and x-rays that were ordered performed.        Injections:         Other:

## 2021-06-08 NOTE — PROGRESS NOTES
This video/telephone visit will be conducted via a call between you and your physician/provider. We have found that certain health care needs can be provided without the need for an in-person physical exam. This service lets us provide the care you need with a video /telephone conversation. If a prescription is necessary we can send it directly to your pharmacy. If lab work is needed we can place an order for that and you can then stop by our lab to have the test done at a later time.    Just as we bill insurance for in-person visits, we also bill insurance for video/telephone visits. If you have questions about your insurance coverage, we recommend that you speak with your insurance company.    Patient has given verbal consent for video/Telephone visit? yes  Patient would like the video visit invitation sent by: Text to cell phone: [phone]  ZENIA/GIA CARTWRIGHT     Patient verified allergies, medications and pharmacy via phone. PHQ : and MING:  done verbally with writer. Patient states she is ready for visit.  PHQ-22  MING-19    Nothing reported on [place]     ________________________________________  Medications Phoned  to Pharmacy [] yes [x]no  Name of Pharmacist:  List Medications, including dose, quantity and instructions    Medications ordered this visit were e-scribed.  Verified by order class [x] yes  [] no  Cymbalta 20 mg    Medication changes or discontinuations were communicated to patient's pharmacy: [] yes  [x] no    Dictation completed at time of chart check: [] yes  [x] no    I have checked the documentation for today s encounters and the above information has been reviewed and completed.

## 2021-06-08 NOTE — TELEPHONE ENCOUNTER
Who is calling:  Patient   Reason for Call:  Caller stated that Angela Dewitt MD nurse emailed her the letter but when she went to open it, there was nothing there. Caller is wondering if Angela Dewitt MD nurse can email it again.   Date of last appointment with primary care:   Okay to leave a detailed message: Yes

## 2021-06-08 NOTE — TELEPHONE ENCOUNTER
Triage call:   Patient calling to see if the serology testing can be done as a drive up test- assisted in transferring to scheduling for appointment- per scheduling only the COVID swabs can be done as a drive up test. The serology test needs to be done in a lab.   Scheduling to assist further with setting up her appointment .     Janine Almaguer RN [facility] Connection Triage/Med Refill 5/22/2020 9:03 AM

## 2021-06-08 NOTE — PROGRESS NOTES
Anahi Stockton who presents today with a chief complaint of  Consult      Joint Pains: Yes  Location: all over   Onset: November 2019  Intensity:  8/10  AM Stiffness: 1 hour  Alleviating/Aggravating Factors: Medications helpful no   Tolerating Meds: Yes  Other:      ROS:  Patient denies having: persistent dry eyes, dry mouth, recurrent oral ulcers, patchy alopecia, active rashes, photosensitivity, history of psoriasis, active chest pain, active shortness of breath, active cough, active dysuria, history of kidney stones, active abdominal pain, active diarrhea, history of hematochezia, active dysphagia, history of peptic ulcer disease, history of HIV, tuberculosis, hepatitis B or C, Lyme disease, seizure history, raynaud's, active documented fevers, recent infections, difficulty sleeping or chronic unrefreshing sleep, involuntary weight loss, loss of appetite, excessive fatigue, +depression, +anxiety,  recurrent sinus infections, history of inflammatory eye diseases (such as uveitis, scleritis, iritis, etc).     Information gathered by medical assistant incorporated into this note, was reviewed and discussed with the patient.    Problem List:  Patient Active Problem List   Diagnosis     Allergic rhinitis     Depressive disorder        PMH:   Past Medical History:   Diagnosis Date     Aneurysm (H)      Depression        Surgical History:  No past surgical history on file.    Family History:  Family History   Problem Relation Age of Onset     Hypertension Mother      Diabetes Father      Stroke Father      Hypertension Father        Social History:   reports that she has never smoked. She has never used smokeless tobacco. She reports previous alcohol use.  has two children, not working.     Allergies:  No Known Allergies     Current Medications:  Current Outpatient Medications   Medication Sig Dispense Refill     cholecalciferol, vitamin D3, 1,000 unit (25 mcg) tablet Take 1,000 Units by mouth 3 (three) times a  "week.       DULoxetine (CYMBALTA) 20 MG capsule Take 1 capsule (20 mg total) by mouth 2 (two) times a day. 60 capsule 2     FERROCITE 324 mg (106 mg iron) Tab Take 1 tablet by mouth daily.       multivitamin therapeutic tablet Take 1 tablet by mouth daily.       No current facility-administered medications for this visit.            Physical Exam:  There were no vitals taken for this visit.  General: A & O x 3 in NAD  MS: Patient able to rise from seated position unassisted.    Anahi Stockton is a 40 y.o. female who is being evaluated via a billable video visit.      The patient has been notified of following:     \"This video visit will be conducted via a call between you and your physician/provider. We have found that certain health care needs can be provided without the need for an in-person physical exam.  This service lets us provide the care you need with a video conversation.  If a prescription is necessary we can send it directly to your pharmacy.  If lab work is needed we can place an order for that and you can then stop by our lab to have the test done at a later time.    Video visits are billed at different rates depending on your insurance coverage. Please reach out to your insurance provider with any questions.    If during the course of the call the physician/provider feels a video visit is not appropriate, you will not be charged for this service.\"    Patient has given verbal consent to a Video visit? Yes    Patient would like to receive their AVS by AVS Preference: Yuli.    Patient would like the video invitation sent by: Text to cell phone: [phone]    Will anyone else be joining your video visit? No        Video Start Time: 9:13 AM    Additional provider notes:       Video-Visit Details    Type of service:  Video Visit    Video End Time (time video stopped): 9:52 AM  Originating Location (pt. Location): Home    Distant Location (provider location):  [facility]     Platform used for " Video Visit: Wei Birmingham DO      Summary/Assessment:    Pleasant 40-year-old female presents with chronic joint pains, muscle aches and weakness.    Patient explains that in November 2019 she came down with cold-like symptoms which lasted longer than usual for about a month.  After recovering she came down with pins-and-needles sensations involving her right foot followed by pain involving right knee, followed by generalized pains.    Since onset she still been experiencing some generalized pains along with numbness/weakness sensations involving arms and legs.    Has also been feeling fatigued.    States prior to onset of symptoms was totally fine and very healthy.    Patient went to urgent care who referred patient to ER, had MRI of brain which showed small brain aneurysm she thereafter saw neurosurgery and told not related to current symptoms.    Patient also saw neurology, claims to have had EMG study of upper and lower extremities which were normal, neurology as well felt symptoms not neurological at this time (per patient).      Told may have fibromyalgia by neurology and PCP.    Denies any recent stressors or travel history just prior to onset of symptoms.    Admits to having anxiety/depression however states this was doing better prior to onset of symptoms, now the symptoms have resurfaced.    Was started on Cymbalta by PCP, about 3 weeks ago, has not noticed improvement yet, does have some nausea associated with it.  Per patient, for now by PCP recommended continuing as sometimes may take longer trial to take effect.    Currently pains are mainly involving neck, upper back, knees and feet.    Has not tried taking Tylenol.    Has tried ibuprofen 400 mg twice a day with insufficient relief, typically helps more so her headaches.    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    Noted to be vitamin D deficient with a level in 12 range, lately  taking 1000 units daily.  Denies trying replenishing dose.    Labs performed to date have not been revealing.    Taking iron for low normal ferritin level.    Given the above, difficult to tell at this time as to what the exact underlying etiology is that is contributing to her symptoms.      Patient may have a myofascial pain syndrome/fibromyalgia however this is a diagnosis of exclusion and sometimes may just be a secondary condition.    Denies being pregnant at this time.    On video exam: Patient able to rise from seated position unassisted.    Please see below for management plan.    Pertinent rheumatology/past medical history (please refer to above for more detailed history):      Chronic multiple joint pains (after URI in November 2019, knees, feet).    Myofascial pains    Paresthesias (arms and legs, unremarkable neurological work-up)    Chronic neck pain    Chronic upper back pain    Chronic headaches    Nonrestorative sleep (since 11/2019)    Anxiety/depression    Vitamin D deficiency      Rheumatology medications provided/suggested:    Tylenol  Flexeril  Vitamin D      Pertinent medication from other providers or from otc (please refer to above for more detailed med list):    Iron supplements  Cymbalta  Multivitamin      Pertinent medications already tried:       Ibuprofen    Pertinent lab history:      Negative/unremarkable: Rheumatoid factor, OMID, Lyme antibody, ESR, CRP, SPEP, CK x2, magnesium, CBC, creatinine, LFTs, TSH, B12.    Low: Vitamin D    Pertinent imaging/test history:    MRI brain impression:  HEAD MRI:   1.  Normal head MRI.   HEAD MRA:  1.  1 x 2 mm aneurysm versus infundibulum in the left MCA trifurcation.  2.  Further assessment with a head CTA is recommended as this may be able to discern between the 2.  3.  The head MRA is otherwise normal.     4.  NECK MRA:  5.  Normal neck MRA.      MRI cervical spine impression:  1.  Mild cervical spondylosis most significant at C5-C6. No cord  signal abnormality.  2.  At C5-C6, mild spinal canal stenosis and low-grade narrowing of the right neural foramen.  3.  No high-grade spinal canal or neural foraminal stenosis elsewhere.       Other:    , has 2 children ages 17 and 8 years old.  Works as a .    Denies regular alcohol beverage intake or tobacco use.    Currently not using any contraceptive.      Plan:      For arthralgias/myalgias, suggest paient take over-the-counter Tylenol 500-1000 mg twice daily as needed for pain relief.    We will add Flexeril to act as a muscle relaxant hopefully improve her sleep.    Deferred referral to PT.    We will obtain x-rays of: Thoracic spine, knees and feet.    We will obtain some labs and correlate clinically.    Patient deferred going for x-rays and labs at this time due to COVID-19 and desires to have option to pursue when feels more comfortable. States her  is immunosuppressed and has 2 kids with asthma.    Patient requested letter for employer for time off from work due to symptoms.  Made aware that this should come from her PCP as currently does not have known autoimmune/connective tissue disease.  Made aware that unfortunately we do not have any objective findings for autoimmune/connective tissue disease at this time and if desires we can provide just a letter mentioning she  requests time off from work due to symptoms being worked up/meds initiated and to consider accommodating so she can better recuperate.  However, patient desired letter stating recommending time off work, which unfortunately we cannot provide such statement at this time.    Will refer patient to pain clinic as desires another provider to manage chronic pains..    If above work-up is unrevealing and if still symptomatic despite the above a consideration is referring to ID.    Regarding nausea associated with Cymbalta, recommend she discuss further with PCP.    Has been taking vitamin D 1000 units daily,  recommend increasing to 2000 units daily.  Repeat level included in lab orders.    Follow-up in 6 weeks.    Procedure note:           Major side effect profile of medications provided/suggested were discussed with the patient.    This note was transcribed using Dragon voice recognition software as a result unintentional grammatical errors or word substitutions may have occurred. Please contact our Rheumatology department if you need any clarification or if you have any related inquiries.    Thank you for referring this patient to our clinic.      Butch Birmingham DO....................  6/9/2020   8:15 AM

## 2021-06-08 NOTE — PROGRESS NOTES
OP MENTAL HEALTH PSYCHOTHERAPY    PATIENT'S NAME:    Anahi Stockton  MRN:                          [id number]  :                          1980  ACCT. NUMBER:      [id number]  DATE OF SERVICE: 2020  START TIME: 1:00pm  END TIME: 1:55pm  VIDEO VISIT:   Telemedicine Visit: The patient's condition can be safely assessed and treated via synchronous audio and visual telemedicine encounter.       Reason for Telemedicine Visit: Services only offered telehealth     Originating Site (Patient Location): Patient's home     Distant Site (Provider Location): Provider Remote Setting     Consent:  The patient/guardian has verbally consented to: the potential risks and benefits of telemedicine (video visit) versus in person care; bill my insurance or make self-payment for services provided; and responsibility for payment of non-covered services.      Mode of Communication:  Video Conference via [employer]     As the provider I attest to compliance with applicable laws and regulations related to telemedicine.     Those present for this visit patient and therapist    Identifying Information:  Patient is a 40 y.o., female, originally born in [place]..  Patient's preferred name is Anahi and  uses the pronoun she/her; the pronoun use throughout this assessment reflects the sex of the patient at birth. Patient was referred for an assessment by  primary care provider. Patient attended the session alone.    The patient describes their cultural background as Swazi with Baptism sienna.  Cultural influences and impact on patient's life structure, values, norms, and healthcare: Belief system - grew up Baptism - family is very progressive and open-minded - I don t know if I can identify with a certain Jainism right now -  also raised Baptism - more spiritual   . The patient reports there  adherence to the family unit , strong work ethic  and achievement   Patient identified her preferred language to be English. Patient reported  "she does not need the assistance of an  or other support involved in therapy. Modifications will not be used to assist communication in therapy.      Chief Complaint:  The reason for seeking services at this time is: \"Increased anxiety due to undiagnosed physical health problems since December 2019.\"      Presenting Concern:  Patient reports current  issues and concerns include \"It started with just fatigue and numbness in my right foot and right arm - after that it began to spread throughout my body - pain all over, body aches, dizziness. Restless leg syndrome. Pain at night causing me to have difficulty staying asleep (describes it as nerve pain or weird sensation like a tingling - pins and needles). Patient reports being unable to work which is adding increased amounts of stress., and has impacted family relationships with\"not being able to support them\", social activities such as \"feeling alone and not being able to talk about my problems\" and employment resulting in taking time off from work.    History of Presenting Concern:  The problem(s) began in December 2019. This is when she had to take time off from work. Patient has attempted to resolve these concerns by attending various doctor appointments. Patient reports that other professional(s) are involved in providing support / services. PCP, neurologist, rheumatologist, psychiatrist. Patient reports, \"I have had many medical visits and everything has come back normal but I am still feeling the same symptoms. It's been a nightmare. The fact that there is no medical diagnosis is driving me even more crazier.\"     Patient experienced anxiety symptoms years ago when her  was diagnosed with cancer (approx 15 years ago). Her  has been through chemotherapy treatments on 6 different occasions and is now currently in remission. She recalls being very anxious and depressed in the past due to this stress. Currently, depression symptoms secondary " "to worsening physical health and anxiety symptoms.    Social/Family History:    Patient reported she grew up in [place].  They were raised by  biological parents. They were one of 4 children with 3 brothers. The patient's mother is currently residing in [place] with patient's brother. Her father is . Patient reports \"Grew up in a family that really valued education. Mom was a  in a school. Dad worked for the government.   Believes she had some anxiety as a teenager but mental health was not something they talked about or knew about in [place].\"     Patient identified their sexual orientation as heterosexual.  Patient's current relationship status is  for 20 years. They met in [place] and she moved to the  at age 21. She recalls that it was hard to find work and hard to make friends. 1.5 years after she moved here, her  started chemotherapy. Her daughter was only 1 week old. Recalls being in  survival mode  and not really thinking much about herself - about 1 year later she participated in some therapy due to this stressful time. Patient reported having 2 children - daughters ages 17 and 8 both of whom have asthma so she feels worried about the pandemic.     Patient's current living/housing situation involves staying in own home/apartment .  Patient identified partner, mother and siblings   as part of their support system.  Patient identified the quality of these relationships as stable and meaningful     Patient is currently on medical leave .  Patient reports their finances are obtained through employment .  Patient does identify finances as a current stressor due to being off of work since December.  Patient reported that she has not been involved with the legal system.   Patient does not have a history of sexual offenses. Patient does not have a current .  Contextual factors outside of patient's control contributing to presenting concerns:  is a 6 time cancer " survivor.     Education and developmental history  Patient's highest education level was college graduate. She reports experiencing no significant development issues in childhood Patient did not identify any learning problems..  Patient did not serve in the .       Medical Issues:  Patient reports the following family medical history:   Patient Active Problem List   Diagnosis     Allergic rhinitis     Depressive disorder   *possible fibromyalgia     Patient has had a physical exam to rule out medical causes for current symptoms.  Date of last physical exam was within the past year. Physical symptoms have developed since last physical exam and patient was encouraged to follow up with PCP. The patient has a Primary Care Provider within this system. PCP is Angela Dewitt  Patient reports the following current medical concerns: dizziness, fatigue, body pain. They did not report dental concerns.  There are not significant nutritional concerns / weight changes.  The patient has not been diagnosed with an eating disorder.The patient reports the presence of chronic or episodic pain. The pain level is severe and has a frequency of daily significance.      Patient reports current meds as:   Current Outpatient Medications   Medication Sig Dispense Refill     cholecalciferol, vitamin D3, 1,000 unit (25 mcg) tablet Take 1,000 Units by mouth 3 (three) times a week.       cyclobenzaprine (FLEXERIL) 10 MG tablet Take 1 tab p.o. qhs (if feeling groggy the following morning, can try taking half a tablet instead or can take earlier the night before). 30 tablet 1     DULoxetine (CYMBALTA) 20 MG capsule Take 3 capsules (60 mg total) by mouth daily. 90 capsule 0     FERROCITE 324 mg (106 mg iron) Tab Take 1 tablet by mouth daily.       multivitamin therapeutic tablet Take 1 tablet by mouth daily.       No current facility-administered medications for this visit.        Patient Allergies:  No Known Allergies    Medical  History:  Past Medical History:   Diagnosis Date     Aneurysm (H)      Depression        Medication Adherence:  Patient reports taking prescribed medications as indicated    Mental Health History:  Patient reported the following biological family members or relatives with mental health issues: brother experienced experienced depression.  Patient has not been previously diagnosed with a mental health diagnosis.. Patient reported symptoms began in December.   Patient has received the following mental health services in the past: counseling.  Hospitalizations: none reported .  Patient denies a history of civil commitment .  Patientis currently receiving the following services: counseling, physician/ PCP and psychiatry. Patient will be referred for IOP program for anxiety and depression.      Current Mental Status Exam:   Appearance: alert, well groomed, appropriately groomed and appears as stated age    Eye Contact: good  Psychomotor: appropriate / unremarkable, no evidence of tardive dyskinesia, dystonia, or tic and intact station and gait  Gait / station: intact  Attitude / Demeanor: cooperative and open and oumou  Speech   Rate / Production:  clear   Volume:  regular volume   Language: intact and no obvious problems  Mood:  sad, anxious and worried   Affect:  mood congruent  Thought Content: suicidal ideation and ruminations  Thought Process: coherent  Associations: Perseverative  Insight:  intact  Judgment: intact  Orientation: person, place, time and situation  Attention/concentration: intact  Recent memory: intact  Remote memory: intact  Fund of knowledge: appropriate    Review of Symptoms:  Depression: Change in sleep, Lack of interest, Excessive or inappropriate guilt, Change in energy level, Difficulties concentrating, Change in appetite, Suicidal ideation, Feelings of hopelessness, Feelings of helplessness, Low self-worth, Ruminations, Feeling sad, down, or depressed, Withdrawn and Frequent crying  Kylah:           No Symptoms  Psychosis:   No Symptoms  Anxiety:       Excessive worry, Nervousness, Physical complaints, such as headaches, , stomachaches, muscle tension, Sleep disturbance, Psychomotor agitation, Ruminations and Poor concentration  Panic:          No symptoms  Post Traumatic Stress Disorder:  No Symptoms  Obsessive Compulsive Disorder: No Symptoms  Eating Disorder:     No Symptoms  Oppositional Defiant Disorder:     No Symptoms  ADD / ADHD:         No symptoms  Conduct Disorder: No symptoms  Autism Spectrum Disorder:   No symptoms    Rating Scales:  PHQ9              22  GAD7              21    Chemical Health History:  Patient reported no family history of chemical health issues  Patient has not received chemical dependency treatment in the past. Patient has not ever been to detox.       Patient denies using alcohol  Patient denies using tobacco  Patient denies using marijuana  Patient denies using caffeine  Patient denies use of cocaine/crack.  Patient denies use of meth/amphetamines  Patient denies use of heroin  Patient denies use of opiates.  Patient denies inhalant use  Patient denies use of benzodiazepines.  Patient denies using hallucinogens.  Patient denies use of barbiturates.  Patient denies use of over the counter drugs.   Patient denies use of other substances.      ADULT CD: Have you ever felt you ought to CUT down on your drinking or drug use? No       Based on the negative CAGE score and clinical interview there  are not indications of drug or alcohol abuse..    Patient is currently receiving the following services No indication of CD issues. Patient reported the following problems as a result of their substance use: none.     CHEMICAL DEPENDENCY Patient is not concerned about substance use.      Patient does not report concerns about gambling and has not ever had treatment for gambling.    Patient does not have other compulsive behaviors she is concerned about.    Significant Losses / Trauma  / Abuse / Neglect Issues:  indications or report of significant loss or trauma related to divorce / relational changes relational stress during 's chemotherapy treatments.    Concerns for possible neglect are not present.     Safety Assessment:  [place] Suicide Severity Rating Scale (Lifetime/Recent)  [place] Suicide Severity Rating (Lifetime/Recent) 5/20/2020 6/18/2020   1. Wish to be Dead (Lifetime) Yes Yes   1. Wish to be Dead (Past Month) Yes Yes   2. Non-Specific Active Suicidal Thoughts (Lifetime) No Yes   2. Non-Specific Active Suicidal Thoughts (Past Month) No Yes   3. Active Suicidal Ideation with any Methods (Not Plan) Without Intent to Act (Lifetime) No No   3. Active Sucidal Ideation with any Methods (Not Plan) Without Intent to Act (Past Month) No No   4. Active Suicidal Ideation with Some Intent to Act, Without Specific Plan (Lifetime) No No   4. Active Suicidal Ideation with Some Intent to Act, Without Specific Plan (Past Month) No No   5. Active Suicidal Ideation with Specific Plan and Intent (Lifetime) No No   5. Active Suicidal Ideation with Specific Plan and Intent (Past Month) No No   Most Severe Ideation Rating (Lifetime) 3 4   Frequency (Lifetime) 3 1   Duration (Lifetime) 1 1   Controllability (Lifetime) 1 2   Deterrents (Lifetime) 1 1   Reasons for Ideation (Lifetime) 5 5   Most Severe Ideation Rating (Past Month) - 4   Frequency (Past Month) - 1   Duration (Past Month) - 1   Controllability (Past Month) - 1   Deterrents (Past Month) - 1   Reasons for Ideation (Past Month) - 5   Actual Attempt (Lifetime) - No   Actual Attempt (Past 3 Months) - No   Has subject engaged in non-suicidal self-injurious behavior? (Lifetime) - No   Has subject engaged in non-suicidal self-injurious behavior? (Past 3 Months) - No   Interrupted Attempts (Lifetime) - No   Interrupted Attempts (Past 3 Months) - No   Aborted or Self-Interrupted Attempt (Lifetime) - No   Aborted or Self-Interrupted Attempt (Past  3 Months) - No   Preparatory Acts or Behavior (Lifetime) - No   Preparatory Acts or Behavior (Past 3 Months) - No       Patient denied a history of homicidal ideation.    Patient denied a history of self injurious ideation and behavior  Patient denied history of personal safety concerns.  Patient denied a history of assaultive behaviors.   Patient denied history of risk behaviors associated with substance use.    Patient denies a history of high risk behaviors associated with mental health symptoms.  Patient denies current homicidal ideation.  Patient denied current self injurious ideation and behaviors,  Patient denied risk behaviors associated with substance use.  Patient denies high risk behaviors associated with mental health symptoms.  Patient denies current concerns for personal safety.  Patient reports the following protective factors: spiritual, positive relationships positive family connections, forward / future oriented thinking, dedication to family and friends, safe and stable environment , secure attachment , abstinence from substances, adherence with prescribed medication, agreement to use safety plan, living with other people, commitment to well-being, sense of meaning and positive social skills  Patient reports there are no firearms in the house.     Plan for safety and risk management:  SAFETY PLAN: Recommended that patient call 911  or go to the local ED should there be a change in any of these risk factors.  RISK INTERVENTION: A safety and risk management plan has been developed including Referred patient to  Psychiatry and Day Treatment    Patient's Strengths and Limitations:  Patient identified the following strengths or resources that will help her succeed in counseling: commitment to health and well being, sienna / spirituality, family support, insight, intelligence, motivation, sense of humor, strong social skills and work ethic. Patient identified the following supports:family Things that may  interfere with the patient's success in counseling include: few friends and physical pat concerns.      Clinical Summary:   Diagnostic Criteria:  DSM5 Classifications and Criteria DSM-5    (Symptoms, frequency and duration, functional functional impairment, Cause, prognosis, likely consequences of symptoms. Dx interacts or impacts with client's life. Explain R/O, other provisional Dx, and why alternative Dx that were considered were ruled out)    Functional Status:  Patient's  symptoms have resulted in the following functional impairments: management of the household and / or completion of tasks, self-care and work / vocational responsibilities     DSM5 Diagnoses: (Sustained by DSM5 Criteria Listed Above)  Diagnoses:  1. Adjustment disorder with mixed anxiety and depressed mood         Psychosocial & Contextual Factors: health concerns; current leave from work    WHODAS:   WHODAS 5/20/2020   Total Score 56       Collaboration:  Collaboration / coordination of treatment will be initiated with the following professionals: Collaboration / coordination of treatment will be initiated with the following professionals: primary care physician and psychiatry    Client and family participation in assessment N/A    Preliminary Treatment Plan:  Plan for Safety and Risk Management:  Recommended that patient call 911  or go to the local ED should there be a change in any of these risk factors.      The concerns identified by the  patient will be addressed in behavioral health services.  Recommendations  Initial Treatment will focus on: Anxiety - following a CBT framework with an emphasis on self-care and managing racing thoughts and patterns of rumination    The following referrals will be initiated Psychiatry Next scheduled Appointment 6/17/2020.    Resources/Service Plan:                  services are not indicated.     Modifications to assist communication are not indicated.                Additional disability  accomodations Patient has requested support for taking time off from work in order to focus on her health and wellness. Therapist has created and sent a letter..                  YES , records were reviewed at time of assessment.    Report to child / adult protection services was not needed.    Information in this assessment was obtained from the medical record and provided by   patient who is a good historian.     Carolyne Figueredo, KI                 06/18/20

## 2021-06-08 NOTE — TELEPHONE ENCOUNTER
"FYI - Status Update  Who is Calling: Patient  Update: States Dr Dewitt was ordering a \"serology test for COVID\", but patient is hesitant to schedule because she does not want to come inside to the lab, patient inquiring if it can be done as a drive-up appointment. Patient requesting the [facility] site to be tested. Please call her at cell number [phone].  Okay to leave a detailed message?:  Yes    "

## 2021-06-08 NOTE — PROGRESS NOTES
"Telemedicine Visit: The patient's condition can be safely assessed and treated via synchronous audio and visual telemedicine encounter.      Reason for Telemedicine Visit: Patient unable to travel    Originating Site (Patient Location): Patient's home    Distant Site (Provider Location): [facility]: [facility]    Consent:  The patient/guardian has verbally consented to: the potential risks and benefits of telemedicine (video visit) versus in person care; bill my insurance or make self-payment for services provided; and responsibility for payment of non-covered services.     Mode of Communication:  Video Conference via ELERTS    As the provider I attest to compliance with applicable laws and regulations related to telemedicine.  Outpatient Psychiatric Consultation     Referral Source:   Angela Dewitt    --  Chief Complaint: \"anxiety\"     --  History of Present Illness / Client Impression of Mental Health Concerns   Anahi Stockton is a 40 y.o. female who presents for initiation of care. Referred by Angela Dewitt due to increasing anxiety. Has never been referred to psychiatric provider in past. Recently established with therapist, Carolyne Figueredo. Appears stated age, clean, well groomed, has medium length dark hair, and wearing simple clothing.    First noted difficulties with anxiety at 22 or 23 year old. Had moved to [place] from [place] at 21 years after marriage to . At that time had just had first daughter and  was diagnosed with cancer. Developed worsened anxiety during in response to 's ongoing cancer treatment and constant risk of his death. He has now been in remission for over 20 years but is still very immunocompromised. Her anxiety recently exacerbated by COVID-19 situation and family at high risk for disease. In addition to , has 17 year old and 8 year old daughter that have asthma making them high risk for respiratory " "infections. Feels like she is letting family down. Anxiety and depressive symptoms interfere in ability to play with younger daughter, be emotionally supportive of older daughter, and engage in work. Depression symptoms secondary to worsening physical health and anxiety symptoms.    Working as a  but struggle and often had to call in sick. Now in the process of applying for disability due to current medical situation and mental health symptoms. Has been receiving letters from neurologist and PCP for neurologist. PCP last gave letter extending time off until end of June. Worried about not having time off of work and risk losing job due to family's insurance and because she genuinely likes work. Tells writer Carolyne wrote letter recommended 10-13 weeks off. Frequently apologetic. Repeatedly asks writer also for a letter for work supporting this.     Describes struggling with problematic anxiety since last November. Numbness, weakness, dizziness, blurred vision. Went to [facility] in December 2019. Had MRI and diagnosed small brain aneurysm. Now seeing neurologist, rhuemotologist but they were unable to diagnosis cause. Suspected fibromyalgia. Continues to have pain. Mental health is worse as pain continues. Has pending referral to pain clinic. Tried scheduling but was told clinic is not taking new patients at this time. Started Cymbalta 20mg two times a day  by PCP for pain second week of May. Has difficulty tolerating side effects of nausea. Was also given Celexa prescription by neurologist last month but felt no relief and also experienced side effects.    Does not talk to mother or brothers about mental health symptoms. Does not want to worry them with her problems. Didn't even tell her family initially when , [place]. Lives with  and two daughters.    --  Psychiatric Review of Systems    Mood: \"nervous\"  o Depression: yes  o Kylah no    Impaired Sleep: yes    Impaired Energy: " "yes    Change of Interest/Anhedonia: yes    Appetite/Weight Changes: yes    Concentration Changes: yes    Negative cognitions of self: yes    Tearfulness: yes    Anxiety/Panic: yes     Thoughts of self harm or suicide: no    Thoughts of harming others: no    Psychosis:  no    Clinical Outcomes Measures:  PHQ-9 Total Score: 22  MING-7: 19    --  Psychiatric History     Current psychiatrist  establishing care for first time today.    Current psychotherapist  working with Carolyne Figueredo. Saw other therapist for short period of time in early 20s.    Current   denies    Past Documented   Psychiatric/SUDs Diagnoses  denies    Hospitalizations  denies    Suicide attempts  denies    Past medication trials & Results  citalopram- d/c due to feelings of numbness    Electroconvulsive therapy  denies    Guardianship/Power of /Conservator  denies    Judicial commitments  denies     --  Recent Substance Use   reports previous alcohol use. denies   reports no history of drug use. denies   reports that she has never smoked. She has never used smokeless tobacco.  reports previous caffeine use. Stopped drinking coffee since 2019 due to nausea and anxiety.    --  Complicated Withdrawal Syndromes  denies    --  Prior Substance Abuse Treatments  denies    --  Birth & Development History  City and state of birth: [place].  Living circumstances: lived with mother, father, and 3 brothers. Describes self as closer with father and he  at 28 years. Close with 2 brothers. Describes \"best friend\" as brother that is 1 year younger. Moved to [place] after marrying  who is also from [place] at the age of 21 years. Mother still lives in [place]. One brother lives in [place], closest one in [place], and one still lives in [place].  Somatic growth compared to peers: normal.  Academic performance in elementary school: The patient reports no history of problems with learning or school.  Highest education achieved: earned a Bachelor's " degree in economics and minor in [employer] science in [employer] in [place] which she is very proud of.    --  Trauma & Abuse History  Major accidents and injuries: denies.  Concussions or traumatic brain injury: denies.    Sexual/physical/emotional abuse/trauma:  denies abuse. Verbalizes trauma 3-4 years ago when considered divorce with  and teenage daughter had signficant mental health episode. thinks she should of dealt with. still has intrusive thoughts of past trauma at least a couple times a week. thinks about threat of divorce everytime she interacts with ..started seeing therapist at the time but did not continue going because she felt uncomfortable talking about pain.    --  Spiritual History  Sources of hope, meaning, comfort, strength, peace and love: her daughters.  Part of an organized Baptism: The patient reports a history of affiliation with Mandaen but no longer practicing Sikhism sienna. describes self as spirtual..    --  Past Medical History  Primary Care Provider: Angela Dewitt MD    Medical History:  has a past medical history of Aneurysm (H) and Depression.    Medications:   Current Outpatient Medications on File Prior to Visit   Medication Sig Dispense Refill     cholecalciferol, vitamin D3, 1,000 unit (25 mcg) tablet Take 1,000 Units by mouth 3 (three) times a week.       cyclobenzaprine (FLEXERIL) 10 MG tablet Take 1 tab p.o. qhs (if feeling groggy the following morning, can try taking half a tablet instead or can take earlier the night before). 30 tablet 1     DULoxetine (CYMBALTA) 20 MG capsule Take 1 capsule (20 mg total) by mouth 2 (two) times a day. 60 capsule 2     FERROCITE 324 mg (106 mg iron) Tab Take 1 tablet by mouth daily.       multivitamin therapeutic tablet Take 1 tablet by mouth daily.       No current facility-administered medications on file prior to visit.        --  Family History  Mental illness: depression in 1 brother.  Addiction: denies.  Suicide:  denies.  Medical: family history includes Diabetes in her father; Hypertension in her father and mother; Stroke in her father.    --  Sexual/Obstetric History  Last menstrual period: Patient's last menstrual period was 05/27/2020.   Pregnancy history: birth to 2 daughters.    Sexually active: unknown  Current contraception: unknow    --  Surgical History   has no past surgical history on file.    --  Allergies  No Known Allergies    --  Pain Medicine History  Has never been involved in a pain clinic.    --  [place] Prescription Monitoring Program  Not indicated for this patient.    --  Social History  Marital status: is .  Sexual orientation: identifies as a heterosexual.  Number of children:  2 daughters    Current living circumstances: The patient lives with  and 2 daughters.  Employment status: on medical leave from work and working full time as   Current sources of financial support: in process of applying for disability.    Social supports: .  Hobbies/interests: The patient reports the following hobbies and interests:  spending time with family    --   History  Denied  service.    --  Legal History  The patient has no history of legal problems.    --  Summary of Diagnostic Studies  No visits with results within 30 Day(s) from this visit.   Latest known visit with results is:   Lab Requisition on 02/07/2020   Component Date Value Ref Range Status     MMA Serum/Plasma, Vitamin B12 Stat* 02/07/2020 0.13  0.00 - 0.40 umol/L Final     ACH Receptor (Muscle) Modulating A* 02/07/2020 0  % Final     ACH Receptor (Muscle) Binding Anti* 02/07/2020 0.00  <=0.02 nmol/L Final     Striational (Striated Muscle) Anti* 02/07/2020 Negative  <1:120 titer Final     MG Interpretive Comments 02/07/2020 SEE BELOW   Final     CK, Total 02/07/2020 116  30 - 190 U/L Final     Ferritin 02/07/2020 17  10 - 130 ng/mL Final     Magnesium 02/07/2020 1.9  1.8 - 2.6 mg/dL Final     Albumin %  02/07/2020 63.6  51.0 - 67.0 % Final     Albumin  02/07/2020 4.2  3.2 - 4.7 g/dL Final     Alpha 1 % 02/07/2020 2.7  2.0 - 4.0 % Final     Alpha 1 02/07/2020 0.2  0.1 - 0.3 g/dL Final     Alpha 2 % 02/07/2020 10.2  5.0 - 13.0 % Final     Alpha 2 02/07/2020 0.7  0.4 - 0.9 g/dL Final     % Beta 02/07/2020 11.6  10.0 - 17.0 % Final     Beta 02/07/2020 0.8  0.7 - 1.2 g/dL Final     Gamma Globulin % 02/07/2020 11.9  9.0 - 20.0 % Final     Gamma Globulin 02/07/2020 0.8  0.6 - 1.4 g/dL Final     ELP Comment 02/07/2020 Unremarkable protein electrophoresis.     Final     Protein, Total 02/07/2020 6.6  6.0 - 8.0 g/dL Final     Path ICD: 02/07/2020 R20.0   Final     Interpreted By: 02/07/2020 Yusef Solorzano MD   Final     Sed Rate 02/07/2020 6  0 - 20 mm/hr Final     MuSK Autoantibody, S 02/07/2020 0.00  0.00 - 0.02 nmol/L Final       --  Review of Systems  Wt Readings from Last 3 Encounters:   01/09/20 136 lb (61.7 kg)   12/27/19 136 lb (61.7 kg)   12/20/19 134 lb (60.8 kg)     Temp Readings from Last 3 Encounters:   12/17/19 98  F (36.7  C) (Oral)   12/17/19 98.4  F (36.9  C) (Oral)     BP Readings from Last 3 Encounters:   01/09/20 112/68   12/27/19 110/74   12/20/19 120/80     Pulse Readings from Last 3 Encounters:   01/09/20 72   12/27/19 74   12/20/19 78      LMP 05/27/2020  unable to assess today Pain Score: high  Pain Location: throughout body     As noted in the subjective section above, otherwise a 10 point review of systems is negative. Limited ability to assess given virtual nature of visit. Review of symptoms based entirely on patient's verbal report and what writer is able to assess via camera.    --  Mental Status Examination    Appearance: Appears stated age, clean, well groomed, has medium length dark hair, and wearing simple clothing.  Orientation: Patient alert and oriented to person, place, time, and situation  Reliability:  Patient appears to be an adequate historian.   Behavior: Patient makes good  "eye contact and engages with normal rapport in the interview. There is no evidence of responding to hallucinations or flashbacks. Restless. Frequently tearful.  Speech: Speech is spontaneous and coherent, with a normal rate, rhythm and tone. Repetitive and rambling at times.    Language: There are no difficulties with expressive or receptive language as observed throughout the interview.    Mood: Described as \"nervous\".    Affect: Congruent and shows a normal range and higher than normal level of reactivity.  Judgement: Able to make basic decision regarding safety.  Insight: Good awareness of physical and mental health conditions and aware of needs around care for these.  Gait and station: unable to assess   Thought process: Ruminative  Thought content: No evidence of delusions or paranoia.  No thoughts of self harm or suicide. No thoughts of harming others. Intrusive thoughts of  wanting to divorce her. Perseverative on risk of losing job and obtaining more time from work for healing/treatment purposes.  Associations: Connected  Fund of knowledge: Average  Attention / Concentration: Able to remain focused during the interview with some distractibility and need for redirection.  Short Term Memory: Grossly intact as evidence by client recalling themes and ideas discussed.  Long Term Memory: Intact  Motor Status: No recent apparent change.  No current tremor.    --  Diagnostic Impression:  1. Depressive disorder  - DULoxetine (CYMBALTA) 20 MG capsule; Take 3 capsules (60 mg total) by mouth daily.  Dispense: 90 capsule; Refill: 0    --  Medical Decision-Making   Anahi Stockton is a 40 y.o. [place] female who presents for initiation of psychiatric care. This is her first experience with psychiatric specialty. Referred to this writer by PCP due to worsening depressive symptoms, repeated requests to extend time off of work, and not working for past several months. Currently seeing Carolyne Figueredo every other week " for individual therapy who sent letter to patient's employer reccomending 10-12 extension of time off work today. PCP planned for patient to return part time mid-July. Anahi meets criteria for unspecified anxiety and depressive disorder today. Past medication trials include: citalopram.    Anxiety symptoms predominant in today's visit. Also acutely depressed but depression seems secondary to anxiety and medical conditions. Per review of records, patient has taken duloxetine 40mg for approximately 1 month today. Denies any therapeutic effect for mood or pain symptoms. Complains of side effect of nausea which is more likely related to anxiety versus medication because of it's duration greater than 1 month. Provided education on illness, side effects, and current medications. Will increase Cymbalta today to 60mg to better target anxiety, depression, and pain symptoms. Plan to consider alternative monotherapy for anxiety if no positive therapeutic effect next visit. Patient in agreement with plan and verbalized understanding. Encouraged patient to communicate with writer as needed with questions. Patient asked to follow up sooner in 2 weeks due to severity of anxiety symptoms. Ruminative on risk of losing job. Asked writer to also write letter for employer sharing therapists recommendation despite education that second letter should not be needed. Writer agreeable today to reinforce therapist's recommendation based on collateral information found, patient reported disturbances in ability to function, and to encourage therapeutic rapport. Will continue assessing and updating as needed. Plan to collaborate with therapist and PCP regarding needs ongoing. No new lab work indicated today.     Plan to follow up in 2 weeks for evaluation of current medication trials, lab work, and ongoing psychiatric assessment. Patient educated that they may schedule sooner appointment or contact writer for any worsening or lack of improvement  in symptoms.     Patient denies suicidal and homicidal ideation. Not at imminent risk this visit. Educated on need to seek emergent services should they become a risk to themselves or others. Anahi Stockton verbalized understanding and agreement with this safety plan.    Rule out: MDD, dysthemia, MING, adjustment disorder, PTSD, anxiety due to other medical condition, and depression due to other medical condition.    --  Plan  1. Continue to monitor for safety  2. Current Medications  1. TITRATE duloxetine from 40mg to 60mg daily for anxiety, depression, and pain  2. Neuroleptic Consent Form Signed n/a  3. Non-Opioid Contract Agreement Form Signed n/a  4. REFILLS: duloxetine  1. Labs ordered this visit: patient asks to defer lab work due to COVID-19 situation. No emergent labwork indicated this visit. Plan to collect when able.  2. Continue individual psychotherapy appointments for mood stabilization and nonpharmacologic coping. Collaborate with interdisciplinary care team as needed.  3. ROIs: unable to complete today virtually.   3. Consent to Communicate: unable to complete today virtually. R  4. Patient will continue abstinence from drugs and alcohol  5. Patient to return to clinic in 2 weeks for evaluation of medication trials and continued assessment. Ongoing patient psychoeducation regarding chronic illness and treatment .  6. I reviewed the potential risks, side effects, and benefits of all medications with the patient. Patient verbalized understanding and was encouraged to call clinic with further questions or concerns.  --  START TIME: 2:00 PM  END TIME: 3:08 PM    Video call duration: 68 minutes with > 50% spent on coordination of care and psycho-education.    Dr. Mónica Bills, DNP, APRN, PMHNP-BC  Nurse Practitioner - Psychiatry    This medical report was made using Dragon Dictation. Spelling and grammatical errors with Dragon exist and are not intentional.

## 2021-06-09 NOTE — PATIENT INSTRUCTIONS - HE
"1. START sertraline 25mg for 4 day and then increase 50mg daily until next visit  2. START taking duloxetine 40mg for 4 days then 20mg for 4 days then discontinue completely  3. Continue other medications as prescribed  4. Have your pharmacy contact us for a refill if you are running low on medications (We may ask you to come into clinic to get a refill from the nurse)  5. No alcohol or drug use  6. No driving if sedated  7. Contact the clinic with any questions or concerns   a. Phone: [phone]  b. Fax: [phone]  8. Reach out for help if you feel like hurting yourself or others:   a. [facility]: [address] [place] [address], [address] (phone: [phone])  b. [facility] Suicide Hotline: 685.582.8361   c. Crisis Texting Line: Text \"[place]\" to 584756  d. Call 911 or go to nearest Emergency room   9. Follow up as directed in 2 weeks, for your appointments, per your After Visit Summary Form    "

## 2021-06-09 NOTE — PROGRESS NOTES
Mental Health Visit Note    Patient: Aanhi Stockton    : 1980 MRN: [id number]    2020    Start time: 3:05pm    Stop Time: 3:55pm   Session # 2    Session Type: Patient is presenting for an Individual session.    Anahi Stockton is a 40 y.o. female is being seen today for    Chief Complaint   Patient presents with     MH Follow Up     Psychotherapy follow-up visit for anxiety   .     Telemedicine Visit: The patient's condition can be safely assessed and treated via synchronous audio and visual telemedicine encounter.      Reason for Telemedicine Visit: Services only offered telehealth    Originating Site (Patient Location): Patient's home    Distant Site (Provider Location): Provider Remote Setting    Consent:  The patient/guardian has verbally consented to: the potential risks and benefits of telemedicine (video visit) versus in person care; bill my insurance or make self-payment for services provided; and responsibility for payment of non-covered services.     Mode of Communication:  Video Conference via Greetz    As the provider I attest to compliance with applicable laws and regulations related to telemedicine.    Those present for this visit patient and therapist    Follow up in regards to ongoing symptom management of anxiety    New symptoms or complaints: racing thoughts, low energy, constant worries    Functional Impairment:   Personal: 4  Family: 3  Social: 2  Work: 4    Clinical assessment of mental status:   Anahi Stockton presented on time.   She was oriented x3, open and cooperative, and dressed appropriately for this session and weather. Her memory was Normal cognitive functioning .  Her speech was  Within normal.  Language was congruent with speech.  Concentration and focus is Within normal. Psychosis is not noted or reported. She reports her mood is Anxious and Depressed.  Affect is congruent with speech and is Congruent w/content of speech.  Fund of knowledge is adequate. Insight is  "adequate for therapy.    Suicidal/Homicidal Ideation present: Patient denies suicidal and homicidal ideations/means or plans.     Patient's impression of their current status:   Patient reports that she has been experiencing constant anxiety. She reports worrying a lot about the unknowns. Patient notices tension in her neck and upper back. Patient also notices a lack of energy. The physical pain and weakness is still there. Patient describes major sources of stress associated with this pandemic. \"when I start to feel sorry for myself, I feel like I'm not being grateful.\"     Therapist impression of patients current state:   The therapist prompted patient to express thoughts and feelings following a CBT framework. Therapist provided unconditional positive regard and support. Therapist reviewed strategies for managing anxiety symptoms. Therapist helped re-frame unhelpful beliefs such as \"feeling sorry for herself.\" Therapist helped patient identify sources of shame and guilt.     Type of psychotherapeutic technique provided: Client centered and CBT    Progress toward short term goals:Progress as expected, with patient reflecting upon the impact of life events while building trust in the therapeutic process. Patient appeared very open to following therapist recommendations.     Review of long term goals: Treatment Plan updated       Diagnosis:   1. Adjustment disorder with mixed anxiety and depressed mood    no change  R/O Generalized Anxiety Disorder     Plan and Follow-up:   Therapist will provide resources about the nature of somatic complaints related to difficult life events. Therapist will encourage patient to participate in grief work using narrative therapy techniques. Therapist will coordinate care with patient's psychiatrist. Patient agrees to continue taking medication as prescribed.   Therapist recommended following book for continued education: \"Minding the Body, Mending the Mind\" by Lauren Sanchez "     *plan to focus on grounding and deep breathing strategies during next visit - (pg 66)    Discharge Criteria/Planning: Patient will continue with follow-up until therapy can be discontinued without return of signs and symptoms.    I have reviewed the note as documented above.  This accurately captures the substance of my conversation with the patient.    As the provider I attest to compliance with applicable laws and regulations related to telemedicine.  Performed and documented by KI Josue 7/6/2020

## 2021-06-09 NOTE — PROGRESS NOTES
Outpatient Mental Health Treatment Plan    Name:  Anahi Stockton  :  1980  MRN:  [id number]    Treatment Plan:  Initial Treatment Plan  Intake/initial treatment plan date:  DA completed on 20  Benefit and risks and alternatives have been discussed: Yes  Is this treatment appropriate with minimal intrusion/restrictions: Yes  Estimated duration of treatment:  Approximately 10 sessions.  Anticipated frequency of services:  Every 1 weeks  Necessity for frequency: This frequency is needed to establish therapeutic goals and for continuity of care in order to monitor progress.  Necessity for treatment: To address cognitive, behavioral, and/or emotional barriers in order to work toward goals and to improve quality of life.    Session Type: Patient is presenting for an Individual session. via video conference using netomat application    Plan:           ?   ? Anxiety    Goal:  Decrease average anxiety level from 4 to 3.   Strategies: ? [x]Learn and practice relaxation techniques and other coping strategies (e.g., thought stopping, reframing, meditation)     ? [x] Increase involvement in meaningful activities     ? [x] Discuss sleep hygiene     ? [x] Explore thoughts and expectations about self and others     ? [x] Identify and monitor triggers for panic/anxiety symptoms     ? [x] Implement physical activity routine (with physician approval)     ? [x] Consider introduction of bibliotherapy and/or videos     ? [x] Continue compliance with medical treatment plan (or explore barriers)   ?Degree to which this is a problem: 4  Degree to which goal is met: 1  Date of Review: 2020         ? Depression    Goal:  Decrease average depression level from 3 to 2.   Strategies:    ?[x] Decrease social isolation     [x] Increase involvement in meaningful activities     ?[x] Discuss sleep hygiene     ?[x] Explore thoughts and expectations about self and others     ?[x] Process grief (loss of significant person, independence,  role, etc.)     ?[x] Assess for suicide risk     ?[x] Implement physical activity routine (with physician approval)     [x] Consider introduction of bibliotherapy and/or videos     [x] Continue compliance with medical treatment plan (or explore barriers) ?  Degree to which this is a problem: 3  Degree to which goal is met: 1  Date of Review: October 2020           Functional Impairment:  1=Not at all/Rarely  2=Some days  3=Most Days  4=Every Day    Personal : 4  Family : 3  Social : 3   Work/school : 4    Diagnosis:  (EXAMPLE of DSM V: Major depressive disorder, recurrent, moderate; Generalized Anxiety disorder; borderline personality per patient PHI; fibromyalgia, History of breast cancer in remission; Problem with primary relationship.)   Adjustment Disorder with mixed anxiety and depressed mood  R/O generalized anxiety disorder  R/O Major depressive disorder    Clinical assessments and measures completed at Intake:  MING-7 = 21 (7/2/2020)  PHQ=9 = 23 (7/2/2020)  C-SSRS = low risk     Strengths:  intelligent, articulate, insightful, motivated   Limitations:  lack of mental health experience  Cultural Considerations: Patient is a 40 year old [place] woman. She immigrated to the [place] at age 22.     Persons responsible for this plan: Patient and Provider  Pt unable to sign due to virtual visit          Psychotherapist Signature           Patient Signature:              Guardian Signature             Provider: Performed and documented by KI Josue   Date:  7/13/2020

## 2021-06-09 NOTE — PROGRESS NOTES
Mental Health Visit Note    Patient: Anahi Stockton    : 1980 MRN: [id number]    2020    Start time: 3:00pm    Stop Time: 3:50pm   Session # 3    Session Type: Patient is presenting for an Individual session.    Anahi Stockton is a 40 y.o. female is being seen today for    Chief Complaint   Patient presents with     MH Follow Up     Psychotherapy follow-up visit for anxiety and depression   .     Telemedicine Visit: The patient's condition can be safely assessed and treated via synchronous audio and visual telemedicine encounter.      Reason for Telemedicine Visit: Services only offered telehealth    Originating Site (Patient Location): Patient's home    Distant Site (Provider Location): Provider Remote Setting    Consent:  The patient/guardian has verbally consented to: the potential risks and benefits of telemedicine (video visit) versus in person care; bill my insurance or make self-payment for services provided; and responsibility for payment of non-covered services.     Mode of Communication:  Video Conference via [employer]  Switched to telephone encounter after 25 minutes due to connectivity issues    As the provider I attest to compliance with applicable laws and regulations related to telemedicine.    Those present for this visit patient and therapist    Follow up in regards to ongoing symptom management of anxiety    New symptoms or complaints: neck pain and tension    Functional Impairment:   Personal: 4  Family: 3  Social: 2  Work: 4    Clinical assessment of mental status:   Anahi Stockton presented on time.   She was oriented x3, open and cooperative, and dressed appropriately for this session and weather. Her memory was Normal cognitive functioning .  Her speech was  Within normal.  Language was congruent with speech.  Concentration and focus is Within normal. Psychosis is not noted or reported. She reports her mood is Anxious and Depressed.  Affect is congruent with speech and is Congruent  "w/content of speech.  Fund of knowledge is adequate. Insight is adequate for therapy.    Suicidal/Homicidal Ideation present: Patient denies suicidal and homicidal ideations/means or plans.     Patient's impression of their current status:   Patient reports that she has been experiencing chronic neck pain.   Patient acknowledges that she does avoid talking to friends and family members about her problems. She discusses how others perceive her as having everything under control but how she has difficulty connecting to her vulnerability. Patient shares that she does not have many 'close' friends due to her avoidance of vulnerability. Patient recalls her experience being an immigrant woman and \"just surviving\" in the beginning. Patient is realizing how many things she has never shared or talked about before including the following subjects: relationship with ; parenting challenges; changes in Mandaeism and spiritual beliefs and disappointment in [place].      Therapist impression of patients current state:   The therapist prompted patient to express thoughts and feelings following a CBT framework. Therapist provided unconditional positive regard and support. Therapist affirmed patient's efforts to follow treatment recommendations.     Therapist guided patient through an abdominal breathing exercise today as an introduction to mindfulness meditation.     Type of psychotherapeutic technique provided: Client centered and CBT    Progress toward short term goals:Progress as expected, with patient reflecting upon the impact of life events while building trust in the therapeutic process. Patient appeared very open to following therapist recommendations.   Patient did buy book as part of her homework assignment - she found it as an audiobook.     Review of long term goals: Treatment Plan updated       Diagnosis:   1. Adjustment disorder with mixed anxiety and depressed mood    no change  R/O Generalized Anxiety Disorder " "  R/O Major Depressive Disorder     Plan and Follow-up:   Therapist will provide resources about the nature of somatic complaints related to difficult life events. Therapist will encourage patient to participate in grief work using narrative therapy techniques. Therapist will coordinate care with patient's psychiatrist. Patient agrees to continue taking medication as prescribed.   Therapist recommended following book for continued education: \"Minding the Body, Mending the Mind\" by Lauren Snachez - patient purchased this as an audiobook and was recommended to continue reading as therapy homework     *plan to focus on grounding and deep breathing strategies during future visits    Discharge Criteria/Planning: Patient will continue with follow-up until therapy can be discontinued without return of signs and symptoms.    I have reviewed the note as documented above.  This accurately captures the substance of my conversation with the patient.    As the provider I attest to compliance with applicable laws and regulations related to telemedicine.  Performed and documented by KI Josue 7/13/2020  "

## 2021-06-09 NOTE — PROGRESS NOTES
Mental Health Visit Note    Patient: Anahi Stockton    : 1980 MRN: [id number]    2020    Start time: 3:00pm    Stop Time: 3:45pm   Session # 1    Session Type: Patient is presenting for an Individual session.    Anahi Stockton is a 40 y.o. female is being seen today for    Chief Complaint   Patient presents with     MH Follow Up     Psychotherapy follow-up visit for anxiety and depression   .     Telemedicine Visit: The patient's condition can be safely assessed and treated via synchronous audio and visual telemedicine encounter.      Reason for Telemedicine Visit: Services only offered telehealth    Originating Site (Patient Location): Patient's home    Distant Site (Provider Location): Provider Remote Setting    Consent:  The patient/guardian has verbally consented to: the potential risks and benefits of telemedicine (video visit) versus in person care; bill my insurance or make self-payment for services provided; and responsibility for payment of non-covered services.     Mode of Communication:  Video Conference via [place]    As the provider I attest to compliance with applicable laws and regulations related to telemedicine.    Those present for this visit patient and therapist    Follow up in regards to ongoing symptom management of anxiety    New symptoms or complaints: None    Functional Impairment:   Personal: 4  Family: 3  Social: 2  Work: 4    Clinical assessment of mental status:   Anahi Stockton presented on time.   She was oriented x3, open and cooperative, and dressed appropriately for this session and weather. Her memory was Normal cognitive functioning .  Her speech was  Within normal.  Language was congruent with speech.  Concentration and focus is Within normal. Psychosis is not noted or reported. She reports her mood is Anxious and Depressed.  Affect is congruent with speech and is Congruent w/content of speech.  Fund of knowledge is adequate. Insight is adequate for  therapy.    Suicidal/Homicidal Ideation present: Patient denies suicidal and homicidal ideations/means or plans.     Patient's impression of their current status:   Patient reports that she is still having the same physical symptoms. Patient reports that there have been a lot of things on her mind these past few years that she hasn't taken time to process yet - she wonders if the stress just built up over the years. Patient wasn't aware that her symptoms could be a result of painful life events.   Patient shares more about the past few years in greater detail today.     Therapist impression of patients current state:   The patient presented with a depressed affect but easily engaged in dialogue with the therapist. The patient appeared open and honest, willingly providing details about her life story pertinent to underlying symptoms. The patient has experienced much emotional and mental pain over the past few years related to family issues. She is finally ready to share her story and beginning to understand the nature of her symptoms.    Type of psychotherapeutic technique provided: Client centered and CBT    Progress toward short term goals:Progress as expected, with patient reflecting upon the impact of life events while building trust in the therapeutic process    Review of long term goals: Not done at today's visit   Treatment plan to be created after next follow-up visit    Diagnosis:   1. Adjustment disorder with mixed anxiety and depressed mood    no change    Plan and Follow-up:   Therapist will provide resources about the nature of somatic complaints related to difficult life events. Therapist will encourage patient to participate in grief work using narrative therapy techniques. Therapist will coordinate care with patient's psychiatrist. Patient agrees to continue taking medication as prescribed.     Discharge Criteria/Planning: Patient will continue with follow-up until therapy can be discontinued without  return of signs and symptoms.    I have reviewed the note as documented above.  This accurately captures the substance of my conversation with the patient.    As the provider I attest to compliance with applicable laws and regulations related to telemedicine.  Performed and documented by KI Josue 6/25/2020

## 2021-06-09 NOTE — PROGRESS NOTES
This video/telephone visit will be conducted via a call between you and your physician/provider. We have found that certain health care needs can be provided without the need for an in-person physical exam. This service lets us provide the care you need with a video /telephone conversation. If a prescription is necessary we can send it directly to your pharmacy. If lab work is needed we can place an order for that and you can then stop by our lab to have the test done at a later time.    Just as we bill insurance for in-person visits, we also bill insurance for video/telephone visits. If you have questions about your insurance coverage, we recommend that you speak with your insurance company.    Patient has given verbal consent for video/Telephone visit? yes  Patient would like the video visit invitation sent by: Text to cell phone: N/A or Send to email: yes  ZENIA/LPN/RN  LD    Patient verified allergies, medications and pharmacy via phone. PHQ : and MING:  done verbally with writer. Patient states she  is ready for visit.

## 2021-06-09 NOTE — PROGRESS NOTES
"Telemedicine Visit: The patient's condition can be safely assessed and treated via synchronous audio and visual telemedicine encounter.      Reason for Telemedicine Visit: Patient unable to travel    Originating Site (Patient Location): Patient's home    Distant Site (Provider Location): Provider Remote Setting- Home Office    Consent:  The patient/guardian has verbally consented to: the potential risks and benefits of telemedicine (video visit) versus in person care; bill my insurance or make self-payment for services provided; and responsibility for payment of non-covered services.     Mode of Communication:  Video Conference via [employer]    As the provider I attest to compliance with applicable laws and regulations related to telemedicine.    Outpatient Psychiatric Follow Up    Date of Service: 7/24/2020    --  Chief Complaint: \"anxiety\"     --  History of Present Illness/Client Impression of Mental Health Consult:    Anahi Stockton is a 40 y.o. female who presents for telephone visit follow up appointment. Last visit occurred on 7/2/20. At that time initiated propranolol PRN for anxiety.    Since last visit, tried PRN propranolol with some relief but continues to feel residual anxiety. Frustrated she has been taking medications but still has ongoing difficulties with mental health symptoms. Still has not been able to connect with pain clinic. Spoke with PCP and discussed pain. Asked about starting alternative antidepressant and anxiety. Continues working with individual therapy appointments with Carolyne Figueredo. Received information for neuropsychiatric testing but was told they could not complete at this time due to COVID. Again in-depth discussed illness and symptoms.    States mood is \"anxious\". Rates depression 8/10 and anxiety 10/10. Sleeping approximately 8 hours at night. Feels well rested in the morning. Denies appetite or weight concerns. Denies suicidal and homicidal ideation. No overt psychosis. " Denies all other psychiatric symptoms. Denies new physical concerns.     Medication adherence: Reviewed risk/benefits of medication , Patient able to verbalize understanding of side effects  and Patient verbally consents to taking medications  Medication side effects: none  The patient was given information on medications: current prescriptions from writer, sertraline  [place] Prescription Monitoring program: Not indicated for this patient.    Clinical Outcomes Measures:  PHQ-9 Total Score: 22  MING-7: 19    --  Current Medications:  Current Outpatient Medications   Medication Sig Dispense Refill     acetaminophen (TYLENOL) 500 MG tablet Take 500 mg by mouth every 6 (six) hours as needed for pain.       cholecalciferol, vitamin D3, 1,000 unit (25 mcg) tablet Take 2,000 Units by mouth daily.        cyclobenzaprine (FLEXERIL) 10 MG tablet Take 1 tab p.o. qhs (if feeling groggy the following morning, can try taking half a tablet instead or can take earlier the night before). 30 tablet 1     DULoxetine (CYMBALTA) 20 MG capsule Take 3 capsules (60 mg total) by mouth daily. 90 capsule 0     ibuprofen (ADVIL,MOTRIN) 200 MG tablet Take 400 mg by mouth every 6 (six) hours as needed for pain.       multivitamin therapeutic tablet Take 1 tablet by mouth daily.       propranoloL (INDERAL) 10 MG tablet Take 1 tablet (10 mg total) by mouth 3 (three) times a day as needed (anxiety). 90 tablet 0     No current facility-administered medications for this visit.        --  Allergies  No Known Allergies    --  Summary of Diagnostic Studies  No visits with results within 30 Day(s) from this visit.   Latest known visit with results is:   Lab Requisition on 02/07/2020   Component Date Value Ref Range Status     MMA Serum/Plasma, Vitamin B12 Stat* 02/07/2020 0.13  0.00 - 0.40 umol/L Final     ACH Receptor (Muscle) Modulating A* 02/07/2020 0  % Final     ACH Receptor (Muscle) Binding Anti* 02/07/2020 0.00  <=0.02 nmol/L Final      Striational (Striated Muscle) Anti* 02/07/2020 Negative  <1:120 titer Final     MG Interpretive Comments 02/07/2020 SEE BELOW   Final     CK, Total 02/07/2020 116  30 - 190 U/L Final     Ferritin 02/07/2020 17  10 - 130 ng/mL Final     Magnesium 02/07/2020 1.9  1.8 - 2.6 mg/dL Final     Albumin % 02/07/2020 63.6  51.0 - 67.0 % Final     Albumin  02/07/2020 4.2  3.2 - 4.7 g/dL Final     Alpha 1 % 02/07/2020 2.7  2.0 - 4.0 % Final     Alpha 1 02/07/2020 0.2  0.1 - 0.3 g/dL Final     Alpha 2 % 02/07/2020 10.2  5.0 - 13.0 % Final     Alpha 2 02/07/2020 0.7  0.4 - 0.9 g/dL Final     % Beta 02/07/2020 11.6  10.0 - 17.0 % Final     Beta 02/07/2020 0.8  0.7 - 1.2 g/dL Final     Gamma Globulin % 02/07/2020 11.9  9.0 - 20.0 % Final     Gamma Globulin 02/07/2020 0.8  0.6 - 1.4 g/dL Final     ELP Comment 02/07/2020 Unremarkable protein electrophoresis.     Final     Protein, Total 02/07/2020 6.6  6.0 - 8.0 g/dL Final     Path ICD: 02/07/2020 R20.0   Final     Interpreted By: 02/07/2020 Yusef Solorzano MD   Final     Sed Rate 02/07/2020 6  0 - 20 mm/hr Final     MuSK Autoantibody, S 02/07/2020 0.00  0.00 - 0.02 nmol/L Final       --  Review of Systems  Wt Readings from Last 3 Encounters:   01/09/20 136 lb (61.7 kg)   12/27/19 136 lb (61.7 kg)   12/20/19 134 lb (60.8 kg)     Temp Readings from Last 3 Encounters:   12/17/19 98  F (36.7  C) (Oral)   12/17/19 98.4  F (36.9  C) (Oral)     BP Readings from Last 3 Encounters:   01/09/20 112/68   12/27/19 110/74   12/20/19 120/80     Pulse Readings from Last 3 Encounters:   01/09/20 72   12/27/19 74   12/20/19 78      LMP 07/10/2020  unable to assess today Pain Score: moderate  Pain Location: generalized     As noted in the subjective section above, otherwise a 10 point review of systems is negative. Limited ability to assess given virtual nature of visit. Review of symptoms based entirely on patient's verbal report and what writer is able to assess via camera.  __  Psychiatric  "Examination:    Appearance: unable to assess  Orientation: Patient alert and oriented to person, place, time, and situation  Reliability:  Patient appears to be an adequate historian.    Behavior: unable to assess  Speech: Speech is spontaneous and coherent, with a normal rate, rhythm and tone.    Language:There are no difficulties with expressive or receptive language as observed throughout the interview.    Mood: Described as \"anxious\".    Affect: unable to assess  Judgement: Able to make basic decision regarding safety.  Insight: Good awareness of physical and mental health conditions and aware of needs around care for these.  Gait and station: unable to assess  Thought process: Ruminative  Thought content: No evidence of delusions or paranoia.  No thoughts of self harm or suicide. No thoughts of harming others. Intrusive thoughts of  wanting to divorce her. Perseverative on risk of losing job and obtaining more time from work for healing/treatment purposes.  Associations: Connected  Fund of knowledge: Average  Attention / Concentration: Able to remain focused during the interview with some distractibility and need for redirection.  Short Term Memory: Grossly intact as evidence by client recalling themes and ideas discussed.  Long Term Memory: Intact  Motor Status: unable to assess    Assessment / Impression  1. Adjustment disorder with mixed anxiety and depressed mood  - sertraline (ZOLOFT) 25 MG tablet; Take 1 tablet (25 mg total) by mouth daily for 4 days, THEN 2 tablets (50 mg total) daily for 26 days.  Dispense: 56 tablet; Refill: 0  - propranoloL (INDERAL) 10 MG tablet; Take 1 tablet (10 mg total) by mouth 3 (three) times a day as needed (anxiety).  Dispense: 90 tablet; Refill: 0    2. Depressive disorder  - sertraline (ZOLOFT) 25 MG tablet; Take 1 tablet (25 mg total) by mouth daily for 4 days, THEN 2 tablets (50 mg total) daily for 26 days.  Dispense: 56 tablet; Refill: 0  - DULoxetine (CYMBALTA) " 20 MG capsule; Take 2 capsules (40 mg total) by mouth daily for 4 days, THEN 1 capsule (20 mg total) daily for 4 days.  Dispense: 12 capsule; Refill: 0     Emilyal SHAWNEE Stockton is a 40 y.o. [place] female  who presents for ongoing psychiatric care. Referred to this writer by PCP due to worsening depressive symptoms, repeated requests to extend time off of work, and not working for past several months. Currently seeing Carolyne Figueredo, every other week for individual therapy who sent letter to patient's employer reccomending 10-12 extension of time off work today. PCP planned for patient to return part time mid-July. Anahi meets criteria for adjustment disorder with mixed depressive and anxiety symptoms today. Past medication trials include: citalopram.     No change in mental health status since last visit. Positive effect from PRN addition of propranolol for anxiety. Continues to complain of unmanageable anxiety and depressive symptoms. Denies current side effects from medications. Discussed in depth alternative options. Plan to cross taper duloxetine to sertraline. Encouraged patient to communicate with writer as needed with questions. Patient asked to follow up sooner in 2 weeks due to severity of anxiety symptoms. Will continue assessing and updating as needed. Plan to collaborate with therapist and PCP regarding needs ongoing. Placed referral for neuropsychiatric evaluation to rule out other contributing factors after recent collaboration with therapist. No new lab work indicated today.      Plan to follow up in 2 weeks for evaluation of current medication trials, lab work, and ongoing psychiatric assessment. Patient educated that they may schedule sooner appointment or contact writer for any worsening or lack of improvement in symptoms.      Patient denies suicidal and homicidal ideation. Not at imminent risk this visit. Educated on need to seek emergent services should they become a risk to themselves or others. Anahi  SHAWNEE Stockton verbalized understanding and agreement with this safety plan.     Rule out: MDD, dysthemia, MING, adjustment disorder, PTSD, anxiety due to other medical condition, and depression due to other medical condition.     --  Plan  1. Continue to monitor for safety  2. Current Medications  1. INITIATE sertraline 25mg for 4 days then increase to 50mg daily for depression and anxiety  2. DISCONTINUE duloxetine 60mg due to lack of therapeutic effect   3. Continue propranolol 10mg three times a day PRN for anxiety. Patient educated to hold medication if symptomatic for hypotension  4. Neuroleptic Consent Form Signed n/a  5. Non-Opioid Contract Agreement Form Signed n/a  6. REFILLS: duloxetine, propranolol, and sertraline  1. Labs ordered this visit: patient asks to defer lab work due to COVID-19 situation. No emergent labwork indicated this visit. Plan to collect when able.  2. Complete neuropsychiatric testing to evaluate for other contributing factors for current symptoms  3. Continue individual psychotherapy appointments for mood stabilization and nonpharmacologic coping. Collaborate with interdisciplinary care team as needed.  4. ROIs: unable to complete today virtually.   3. Consent to Communicate: unable to complete today virtually.   4. Patient will continue abstinence from drugs and alcohol  5. Patient to return to clinic in 2 weeks for evaluation of medication trials and continued assessment. Ongoing patient psychoeducation regarding chronic illness and treatment .  6. I reviewed the potential risks, side effects, and benefits of all medications with the patient. Patient verbalized understanding and was encouraged to call clinic with further questions or concerns.    START TIME: 1:35 PM  END TIME: 2:08 PM    Phone call duration: 33 minutes with > 50% spent on coordination of care and psycho-education.    Dr. Mónica Bills, DNP, APRN, PMHNP-BC  Nurse Practitioner - Psychiatry

## 2021-06-09 NOTE — PATIENT INSTRUCTIONS - HE
"1. Schedule neuropsychiatric testing. Someone will call you to help schedule   2. START taking propranolol 10mg three times a day as needed for anxiety  3. Continue other medications as prescribed  4. Have your pharmacy contact us for a refill if you are running low on medications (We may ask you to come into clinic to get a refill from the nurse)  5. No alcohol or drug use  6. No driving if sedated  7. Contact the clinic with any questions or concerns   a. Phone: [phone]  b. Fax: [phone]  8. Reach out for help if you feel like hurting yourself or others:   a. [facility]: [address] [place] [address], [address] (phone: [phone])  b. [facility] Suicide Hotline: 302.913.2344   c. Crisis Texting Line: Text \"[place]\" to [phone]  d. Call 911 or go to nearest Emergency room   9. Follow up as directed in 2 weeks, for your appointments, per your After Visit Summary Form    " CT results received from Toni  and placed on Dr. Woody Church desk for review.

## 2021-06-09 NOTE — PROGRESS NOTES
________________________________________  Medications Phoned  to Pharmacy [] yes [x]no  Name of Pharmacist:  List Medications, including dose, quantity and instructions    Medications ordered this visit were e-scribed.  Verified by order class [x] yes  [] no  Cymbalta 20mg (on taper schedule)  Propanolol 10mg  Sertraline 25mg (Starting new medication)    Medication changes or discontinuations were communicated to patient's pharmacy: [] yes  [x] no    Dictation completed at time of chart check: [] yes  [x] no    I have checked the documentation for today s encounters and the above information has been reviewed and completed.

## 2021-06-09 NOTE — PROGRESS NOTES
________________________________________  Medications Phoned  to Pharmacy [] yes [x]no  Name of Pharmacist:  List Medications, including dose, quantity and instructions    Medications ordered this visit were e-scribed.  Verified by order class [x] yes  [] no  propranolol   Medication changes or discontinuations were communicated to patient's pharmacy: [] yes  [x] N/A    Dictation completed at time of chart check: [x] yes  [] no    I have checked the documentation for today s encounters and the above information has been reviewed and completed.

## 2021-06-09 NOTE — TELEPHONE ENCOUNTER
Outpatient Medication Detail      Disp  Refills  Start  End  ONEAL    DULoxetine (CYMBALTA) 20 MG capsule  90 capsule  0  6/17/2020   --    Sig - Route: Take 3 capsules (60 mg total) by mouth daily. - Oral    Sent to pharmacy as: DULoxetine 20 mg capsule,delayed release (CYMBALTA)    Notes to Pharmacy: Patient does not need this prescription filled today. Instructed to first use remainder of last prescription to make 60mg daily dosing.    E-Prescribing Status: Receipt confirmed by pharmacy (6/17/2020  3:33 PM CDT)

## 2021-06-09 NOTE — PROGRESS NOTES
Anahi Stockton is a 40 y.o. female who is being evaluated via a billable video visit.        Video she is visit   Anahi Stockton   40 y.o. female    Date of Visit: 6/22/2020    Chief Complaint   Patient presents with     Follow-up        Assessment and Plan   1. Depressive disorder  She had a virtual visit with psychiatry and psychology.  I think most of her symptoms are related to her significant anxiety.  Her duloxetine has been increased and she is going to be working closely with them.  She also has been given a work note for the next 10 to 12 weeks so that she can get back to work after that.      2. Anxiety  As above.    3. Other chronic pain  She has not noticed a lot of improvement with the Cymbalta.  We discussed next steps and I would be willing to give her a prescription for gabapentin.  She would like to hold off for the next week or 2 but will let me know after that if she like to try gabapentin.         No follow-ups on file.     History of Present Illness   This 40 y.o. old female is evaluated with a video visit today.  She has been dealing with significant pain, numbness, and anxiety and depression for the last 7 months.  It came on fairly suddenly.  She has had numerous tests which have not been diagnostic.  She has seen neurology and rheumatology and there is no obvious cause of her symptoms.  She is now working with psychiatry and psychology and it is thought that a lot of her symptoms are related to anxiety and depression.  There is probably some underlying fibromyalgia as well.  She is currently on duloxetine and recently had her dose increased to 60 mg daily.  She has not had any problems with that but also has not noted significant improvement.  She has follow-up with psychiatry next week.  She has not been able to work since this all started.  She was given a note by psychiatry to be off for another 10 to 12 weeks and I think that that is appropriate.  If she needs any more letters for her  "employer she will let me know.  We also discussed pain management.  She is currently taking Tylenol, occasional ibuprofen and Flexeril at bedtime.  We discussed that we could also add gabapentin if she would like.  She is going to follow-up with psychiatry first but will let me know if she would like to try that in the future.     Review of Systems: As above, systems otherwise reviewed and negative.     Medications, Allergies and Problem List   Patient Active Problem List   Diagnosis     Allergic rhinitis     Depressive disorder     Anxiety     Other chronic pain     Current Outpatient Medications   Medication Sig Dispense Refill     acetaminophen (TYLENOL) 500 MG tablet Take 500 mg by mouth every 6 (six) hours as needed for pain.       cholecalciferol, vitamin D3, 1,000 unit (25 mcg) tablet Take 2,000 Units by mouth daily.        cyclobenzaprine (FLEXERIL) 10 MG tablet Take 1 tab p.o. qhs (if feeling groggy the following morning, can try taking half a tablet instead or can take earlier the night before). 30 tablet 1     DULoxetine (CYMBALTA) 20 MG capsule Take 3 capsules (60 mg total) by mouth daily. 90 capsule 0     ibuprofen (ADVIL,MOTRIN) 200 MG tablet Take 400 mg by mouth every 6 (six) hours as needed for pain.       multivitamin therapeutic tablet Take 1 tablet by mouth daily.       No current facility-administered medications for this visit.      No Known Allergies       Physical Exam     LMP 05/27/2020     This is an alert female, sitting comfortably, no apparent distress.     Additional Information   Social History     Tobacco Use     Smoking status: Never Smoker     Smokeless tobacco: Never Used   Substance Use Topics     Alcohol use: Not Currently     Drug use: Never            Angela Dewitt MD      The patient has been notified of following:     \"This video visit will be conducted via a call between you and your physician/provider. We have found that certain health care needs can be provided without " "the need for an in-person physical exam.  This service lets us provide the care you need with a video conversation.  If a prescription is necessary we can send it directly to your pharmacy.  If lab work is needed we can place an order for that and you can then stop by our lab to have the test done at a later time.    Video visits are billed at different rates depending on your insurance coverage. Please reach out to your insurance provider with any questions.    If during the course of the call the physician/provider feels a video visit is not appropriate, you will not be charged for this service.\"    Patient has given verbal consent to a Video visit? Yes    Will anyone else be joining your video visit? No        Video Start Time: 9:53 am        Video-Visit Details    Type of service:  Video Visit    Video End Time (time video stopped): 10:10 am  Originating Location (pt. Location): Home    Distant Location (provider location):  [facility]     Platform used for Video Visit: Karina Dewitt MD  "

## 2021-06-09 NOTE — PROGRESS NOTES
Mental Health Visit Note    Patient: Anahi Stockton    : 1980 MRN: [id number]    2020    Start time: 3:00pm    Stop Time: 3:50pm   Session # 4    Session Type: Patient is presenting for an Individual session.    Anahi Stockton is a 40 y.o. female is being seen today for    Chief Complaint   Patient presents with     MH Follow Up     Psychotherapy follow-up visit for anxiety and depression   .     Telemedicine Visit: The patient's condition can be safely assessed and treated via synchronous audio and visual telemedicine encounter.      Reason for Telemedicine Visit: Services only offered telehealth    Originating Site (Patient Location): Patient's home    Distant Site (Provider Location): Provider Remote Setting    Consent:  The patient/guardian has verbally consented to: the potential risks and benefits of telemedicine (video visit) versus in person care; bill my insurance or make self-payment for services provided; and responsibility for payment of non-covered services.     Mode of Communication:  Video Conference via [employer]     As the provider I attest to compliance with applicable laws and regulations related to telemedicine.    Those present for this visit patient and therapist    Follow up in regards to ongoing symptom management of anxiety    New symptoms or complaints: None    Functional Impairment:   Personal: 4  Family: 3  Social: 2  Work: 4    Clinical assessment of mental status:   Anahi Stockton presented on time.   She was oriented x3, open and cooperative, and dressed appropriately for this session and weather. Her memory was Normal cognitive functioning .  Her speech was  Within normal.  Language was congruent with speech.  Concentration and focus is Within normal. Psychosis is not noted or reported. She reports her mood is Anxious and Depressed.  Affect is congruent with speech and is Congruent w/content of speech.  Fund of knowledge is adequate. Insight is adequate for  "therapy.    Suicidal/Homicidal Ideation present: Patient denies suicidal and homicidal ideations/means or plans.     Patient's impression of their current status:   Patient reviews recent events. Patient is still struggling with making a decision for daughters in regards to returning to school during pandemic. The patient is feeling really anxious about this decision.    Patient describes using deep breathing techniques when feeling overwhelmed and anxious.   Patient describes her struggle with spirituality and sienna over the years (\"I've lost sienna\"). Patient understands how experiences with racism have shaped her comfort with vulnerability. (she moved to [place] 3 days before 9/11)     Therapist impression of patients current state:   The therapist prompted patient to express thoughts and feelings following a CBT framework. Therapist provided unconditional positive regard and support. Therapist revisited topics that defined patient's beliefs, values and attitudes over the years.     Therapist guided patient through an abdominal breathing exercise today as an introduction to mindfulness meditation.     Type of psychotherapeutic technique provided: Client centered and CBT    Progress toward short term goals:Progress as expected, with patient reflecting upon the impact of life events while building trust in the therapeutic process. Patient appeared very open to following therapist recommendations.   Patient is practicing her deep breathing techniques.     Review of long term goals:   Not done at today's visit and Date of last review 7/13/2020     Clinical Global Impressions  First:  Considering your total clinical experience with this particular patient population, how severe are the patient's symptoms at this time?: 4 - Moderately ill  Compared to the patient's condition at the START of treatment, this patient's condition is:: 4 - No change  Most recent:  Considering your total clinical experience with this particular " "patient population, how severe are the patient's symptoms at this time?: 4 - Moderately ill  Compared to the patient's condition at the START of treatment, this patient's condition is:: 4 - No change      Clinical Global Impressions Scale Ratings:    1. Considering your total clinical experience with this particular population, how mentally ill is the patient at this time? (which is rated on the following seven-point scale: 1=normal, not at all ill; 2=borderline mentally ill; 3=mildly ill; 4=moderately ill; 5=markedly ill; 6=severely ill; 7=among the most extremely ill patients:) score of 4 today    2. Compared to the patient's condition at admission to the program, currently this patient's condition is: (1=very much improved since the initiation of treatment; 2=much improved; 3=minimally improved; 4=no change from baseline (the initiation of treatment); 5=minimally worse; 6= much worse; 7=very much worse since the initiation of treatment:) score of 4 today      Diagnosis:   1. Adjustment disorder with mixed anxiety and depressed mood    no change  R/O Generalized Anxiety Disorder   R/O Major Depressive Disorder     Plan and Follow-up:   Therapist will provide resources about the nature of somatic complaints related to difficult life events. Therapist will encourage patient to participate in grief work using narrative therapy techniques. Therapist will coordinate care with patient's psychiatrist. Patient agrees to continue taking medication as prescribed.   Therapist recommended following book for continued education: \"Minding the Body, Mending the Mind\" by Lauren Sanchez - patient purchased this as an audiobook and was recommended to continue reading as therapy homework   *HW also to continue practicing deep breathing techniques  *HW practice positive self-talk - (demonstrate same levels of compassion for self as she does for others)    *plan to practice deep breathing strategies at the end of scheduled visit for " consistent practice and guidance    Discharge Criteria/Planning: Patient will continue with follow-up until therapy can be discontinued without return of signs and symptoms.    I have reviewed the note as documented above.  This accurately captures the substance of my conversation with the patient.    As the provider I attest to compliance with applicable laws and regulations related to telemedicine.  Performed and documented by KI Josue 7/20/2020

## 2021-06-10 NOTE — PROGRESS NOTES
Mental Health Visit Note    Patient: Anahi Stockton    : 1980 MRN: [id number]    8/10/2020    Start time: 3:05pm    Stop Time: 3:55pm   Session # 7    Session Type: Patient is presenting for an Individual session.    Anahi Stockton is a 40 y.o. female is being seen today for    Chief Complaint   Patient presents with     MH Follow Up     Psychotherapy follow-up visit for anxiety   .     Telemedicine Visit: The patient's condition can be safely assessed and treated via synchronous audio and visual telemedicine encounter.      Reason for Telemedicine Visit: Services only offered telehealth    Originating Site (Patient Location): Patient's home    Distant Site (Provider Location): Provider Remote Setting    Consent:  The patient/guardian has verbally consented to: the potential risks and benefits of telemedicine (video visit) versus in person care; bill my insurance or make self-payment for services provided; and responsibility for payment of non-covered services.     Mode of Communication:  Video Conference via [employer]     As the provider I attest to compliance with applicable laws and regulations related to telemedicine.    Those present for this visit patient and therapist    Follow up in regards to ongoing symptom management of anxiety and depression    New symptoms or complaints: None    Functional Impairment:   Personal: 4  Family: 3  Social: 2  Work: 4    Clinical assessment of mental status:   Anahi Stockton presented on time.   She was oriented x3, open and cooperative, and dressed appropriately for this session and weather. Her memory was Normal cognitive functioning .  Her speech was  Within normal.  Language was congruent with speech.  Concentration and focus is Within normal. Psychosis is not noted or reported. She reports her mood is Anxious and Depressed.  Affect is congruent with speech and is Congruent w/content of speech.  Fund of knowledge is adequate. Insight is adequate for  "therapy.    Suicidal/Homicidal Ideation present: Patient denies suicidal and homicidal ideations/means or plans.     Patient's impression of their current status:   Patient reports that her physical symptoms have not improved or changed. She continues to work with the psychiatrist on medication intervention.    Patient reports that her kids are going through a hard time and she is \"trying to be the best mom she can be.\" She is filled with self-doubt about her choices. She wonders \"Am I doing the right thing for my family?\" She discussed the origins of self-doubt especially in regards to parenting. She recalls a traumatic event her daughter went through at age 15 which she still believes was her fault due to not making the right decision. She's worried that she'll make the wrong decision about school for her children that will result in a tragedy.   \"I'm doing everything that I can in my control but it's really hard.\"     Therapist impression of patients current state:   The therapist prompted patient to express thoughts and feelings following a CBT framework. Therapist provided unconditional positive regard and support.   Therapist encouraged patient to practice stretching and relaxation techniques - also consider yoga to help with pain management    Type of psychotherapeutic technique provided: Client centered and CBT    Progress toward short term goals:Progress as expected, with patient reflecting upon the impact of life events while building trust in the therapeutic process. Patient appeared very open to following therapist recommendations.   Patient is practicing her deep breathing skills.    Good progress toward short-term goal of gaining self-awareness - \"I feel like it's all connecting\"    Review of long term goals:   Not done at today's visit and Date of last review 7/13/2020     Clinical Global Impressions  Most recent:  Considering your total clinical experience with this particular patient population, how " "severe are the patient's symptoms at this time?: 4 - Moderately ill  Compared to the patient's condition at the START of treatment, this patient's condition is:: 4 - No change      Diagnosis:   1. Adjustment disorder with mixed anxiety and depressed mood    no change  R/O Generalized Anxiety Disorder   R/O Major Depressive Disorder     Plan and Follow-up:   Therapist will provide resources about the nature of somatic complaints related to difficult life events. Therapist will encourage patient to participate in grief work using narrative therapy techniques. Therapist will coordinate care with patient's psychiatrist. Patient agrees to continue taking medication as prescribed.   Therapist recommended following book for continued education: \"Minding the Body, Mending the Mind\" by Lauren Sanchez - patient purchased this as an audiobook and was recommended to continue reading as therapy homework   *HW look up modified yoga for pain and/or chair yoga  *HW also to continue practicing deep breathing techniques  *HW practice positive self-talk - (demonstrate same levels of compassion for self as she does for others)  *HW practice thought re-framing   Ie: \"Just get over it\" to \"it's okay to take a break\"  Ie: \"I can't deal with this\" to \"I'm doing the best that I can\"   Ie: \"my best is not good enough\" to \"I always try my best and I trust that my best is good enough\"     *plan to practice deep breathing strategies at the end of scheduled visit for consistent practice and guidance    Discharge Criteria/Planning: Patient will continue with follow-up until therapy can be discontinued without return of signs and symptoms.    I have reviewed the note as documented above.  This accurately captures the substance of my conversation with the patient.    As the provider I attest to compliance with applicable laws and regulations related to telemedicine.  Performed and documented by KI Josue 8/10/2020  "

## 2021-06-10 NOTE — PROGRESS NOTES
Mental Health Visit Note    Patient: Anahi Stockton    : 1980 MRN: [id number]    2020    Start time: 3:03pm    Stop Time: 3:55pm   Session # 8    Session Type: Patient is presenting for an Individual session.    Anahi Stockton is a 40 y.o. female is being seen today for    Chief Complaint   Patient presents with     MH Follow Up     Psychotherapy follow-up for anxiety and depression   .     Telemedicine Visit: The patient's condition can be safely assessed and treated via synchronous audio and visual telemedicine encounter.      Reason for Telemedicine Visit: Services only offered telehealth    Originating Site (Patient Location): Patient's home    Distant Site (Provider Location): Provider Remote Setting    Consent:  The patient/guardian has verbally consented to: the potential risks and benefits of telemedicine (video visit) versus in person care; bill my insurance or make self-payment for services provided; and responsibility for payment of non-covered services.     Mode of Communication:  Video Conference via [employer]     As the provider I attest to compliance with applicable laws and regulations related to telemedicine.    Those present for this visit patient and therapist    Follow up in regards to ongoing symptom management of anxiety and depression    New symptoms or complaints: None    Functional Impairment:   Personal: 4  Family: 3  Social: 2  Work: 4    Clinical assessment of mental status:   Anahi Stockton presented on time.   She was oriented x3, open and cooperative, and dressed appropriately for this session and weather. Her memory was Normal cognitive functioning .  Her speech was  Within normal.  Language was congruent with speech.  Concentration and focus is Within normal. Psychosis is not noted or reported. She reports her mood is Anxious and Depressed.  Affect is congruent with speech and is Congruent w/content of speech.  Fund of knowledge is adequate. Insight is adequate for  "therapy.    Suicidal/Homicidal Ideation present: Patient denies suicidal and homicidal ideations/means or plans.     Patient's impression of their current status:   Patient recalls her experience sharing her emotions associated with her daughter's trauma. Patient came to realize that she hadn't given herself the opportunity to process her thoughts and feelings about difficult life events. Patient still holds onto a lot of guilt and shame related to her daughter's trauma.   \"part of me wants to yell and scream at my  for what he did.\" She processed the events related to her  in greater detail today.   Patient reports that she still lives in fear about their relationship future.   Patient continues to experience self-doubt and low self-esteem. \"I feel like I failed at the two jobs I had which was to be a mom and a wife.\"   Patient reports that she is not yet ready to return to work.     Therapist impression of patients current state:   The therapist prompted patient to express thoughts and feelings following a CBT framework. Therapist provided unconditional positive regard and support. Therapist provided more psycho-education about trauma and somatic complaints.   Therapist helped patient establish ground rule:  1) practice radical self-acceptance    Type of psychotherapeutic technique provided: Client centered and CBT    Progress toward short term goals:Progress as expected, with patient reflecting upon the impact of life events while building trust in the therapeutic process. Patient appeared very open to following therapist recommendations.   Patient is practicing her deep breathing skills.    Good progress toward short-term goal of gaining self-awareness - \"I feel like it's all connecting\"    Review of long term goals:   Not done at today's visit and Date of last review 7/13/2020   *sacrifices in relationship - has never processed before     *long-term goal is to return to work     Clinical Global " "Impressions  Most recent:  Considering your total clinical experience with this particular patient population, how severe are the patient's symptoms at this time?: 4 - Moderately ill  Compared to the patient's condition at the START of treatment, this patient's condition is:: 3 - Minimally improved      Diagnosis:   1. Adjustment disorder with mixed anxiety and depressed mood    no change  R/O Generalized Anxiety Disorder   R/O Major Depressive Disorder   R/O PTSD    Plan and Follow-up:   Therapist will provide resources about the nature of somatic complaints related to difficult life events. Therapist will encourage patient to participate in grief work using narrative therapy techniques. Therapist will coordinate care with patient's psychiatrist. Patient agrees to continue taking medication as prescribed.   Therapist recommended following book for continued education: \"Minding the Body, Mending the Mind\" by Lauren Sanchez - patient purchased this as an audiobook and was recommended to continue reading as therapy homework     HW: practice radical self-acceptance  *HW look up modified yoga for pain and/or chair yoga  *HW also to continue practicing deep breathing techniques  *HW practice positive self-talk - (demonstrate same levels of compassion for self as she does for others)  *HW practice thought re-framing   Ie: \"Just get over it\" to \"it's okay to take a break\"  Ie: \"I can't deal with this\" to \"I'm doing the best that I can\"   Ie: \"my best is not good enough\" to \"I always try my best and I trust that my best is good enough\"     *plan to practice deep breathing strategies at the end of scheduled visit for consistent practice and guidance    Discharge Criteria/Planning: Patient will continue with follow-up until therapy can be discontinued without return of signs and symptoms.    I have reviewed the note as documented above.  This accurately captures the substance of my conversation with the patient.    As the " provider I attest to compliance with applicable laws and regulations related to telemedicine.  Performed and documented by KI Josue 8/17/2020

## 2021-06-10 NOTE — PROGRESS NOTES
"Telemedicine Visit: The patient's condition can be safely assessed and treated via synchronous audio and visual telemedicine encounter.      Reason for Telemedicine Visit: Patient unable to travel    Originating Site (Patient Location): Patient's home    Distant Site (Provider Location): Provider Remote Setting- Home Office    Consent:  The patient/guardian has verbally consented to: the potential risks and benefits of telemedicine (video visit) versus in person care; bill my insurance or make self-payment for services provided; and responsibility for payment of non-covered services.     Mode of Communication:  Video Conference via [employer]    As the provider I attest to compliance with applicable laws and regulations related to telemedicine.    Outpatient Psychiatric Follow Up    Date of Service: 8/21/2020    --  Chief Complaint: \"I'm happy with the way therapy is going\"     --  History of Present Illness/Client Impression of Mental Health Consult:    Anahi Stockton is a 40 y.o. female who presents for telephone visit follow up appointment. Last visit occurred on 8/7/20. At that time initiated gabapentin three times a day for anxiety.    Feels that she has started opening up more in therapy and talk about past traumas. Cautiously optimistic after feeling benefit after starting gabapentin. Describes less pain today but denies same severity or sharpness of pain since starting. Notices occasional dizziness as side effect but denies problematic or syncopal episodes. Continues to worry about family's safety during pandemic. Decided to have her children attend school virtually which was stressful for her. Worries about her maybe making wrong decision. Trying to spend more time with family outside.    Still not working and finding benefits in time out of work. Has consistently progressed since last two months of not working. Tells writer she signed SUZ[employer] for employer's HR department to request records from writer and " "therapist. Encouraged to follow up with PCP until pain clinic able to take new patients. Continues working with individual therapy appointments with Carolyne Figueredo. Received information for neuropsychiatric testing but was told they could not complete at this time due to COVID. Again in-depth discussed illness and symptoms.     States mood is \"overwhelmed\". Rates depression 8/10 and anxiety 9/10. Sleeping approximately 8 hours at night. Feels well rested in the morning. No new appetite or weight concerns. No suicidal and homicidal ideation. No overt psychosis. No all other psychiatric symptoms. No new physical concerns.     Medication adherence: Reviewed risk/benefits of medication , Patient able to verbalize understanding of side effects  and Patient verbally consents to taking medications  Medication side effects: none   The patient was given information on medications: current prescriptions from writer, sertraline  [place] Prescription Monitoring program: Not indicated for this patient.    Clinical Outcomes Measures:  PHQ-9 Total Score: 23  MING-7: 18    --  Current Medications:  Current Outpatient Medications   Medication Sig Dispense Refill     acetaminophen (TYLENOL) 500 MG tablet Take 500 mg by mouth every 6 (six) hours as needed for pain.       cholecalciferol, vitamin D3, 1,000 unit (25 mcg) tablet Take 2,000 Units by mouth daily.        cyclobenzaprine (FLEXERIL) 10 MG tablet Take 1 tab p.o. qhs (if feeling groggy the following morning, can try taking half a tablet instead or can take earlier the night before). 30 tablet 1     gabapentin (NEURONTIN) 300 MG capsule Take 1 capsule (300 mg total) by mouth 3 (three) times a day. 90 capsule 0     ibuprofen (ADVIL,MOTRIN) 200 MG tablet Take 400 mg by mouth every 6 (six) hours as needed for pain.       multivitamin therapeutic tablet Take 1 tablet by mouth daily.       propranoloL (INDERAL) 10 MG tablet Take 1 tablet (10 mg total) by mouth 3 (three) times a day " as needed (anxiety). 90 tablet 0     sertraline (ZOLOFT) 25 MG tablet Take 1 tablet (25 mg total) by mouth daily for 4 days, THEN 2 tablets (50 mg total) daily for 26 days. 56 tablet 0     No current facility-administered medications for this visit.        --  Allergies  No Known Allergies    --  Summary of Diagnostic Studies  No visits with results within 30 Day(s) from this visit.   Latest known visit with results is:   Lab Requisition on 02/07/2020   Component Date Value Ref Range Status     MMA Serum/Plasma, Vitamin B12 Stat* 02/07/2020 0.13  0.00 - 0.40 umol/L Final     ACH Receptor (Muscle) Modulating A* 02/07/2020 0  % Final     ACH Receptor (Muscle) Binding Anti* 02/07/2020 0.00  <=0.02 nmol/L Final     Striational (Striated Muscle) Anti* 02/07/2020 Negative  <1:120 titer Final     MG Interpretive Comments 02/07/2020 SEE BELOW   Final     CK, Total 02/07/2020 116  30 - 190 U/L Final     Ferritin 02/07/2020 17  10 - 130 ng/mL Final     Magnesium 02/07/2020 1.9  1.8 - 2.6 mg/dL Final     Albumin % 02/07/2020 63.6  51.0 - 67.0 % Final     Albumin  02/07/2020 4.2  3.2 - 4.7 g/dL Final     Alpha 1 % 02/07/2020 2.7  2.0 - 4.0 % Final     Alpha 1 02/07/2020 0.2  0.1 - 0.3 g/dL Final     Alpha 2 % 02/07/2020 10.2  5.0 - 13.0 % Final     Alpha 2 02/07/2020 0.7  0.4 - 0.9 g/dL Final     % Beta 02/07/2020 11.6  10.0 - 17.0 % Final     Beta 02/07/2020 0.8  0.7 - 1.2 g/dL Final     Gamma Globulin % 02/07/2020 11.9  9.0 - 20.0 % Final     Gamma Globulin 02/07/2020 0.8  0.6 - 1.4 g/dL Final     ELP Comment 02/07/2020 Unremarkable protein electrophoresis.     Final     Protein, Total 02/07/2020 6.6  6.0 - 8.0 g/dL Final     Path ICD: 02/07/2020 R20.0   Final     Interpreted By: 02/07/2020 Yusef Solorzano MD   Final     Sed Rate 02/07/2020 6  0 - 20 mm/hr Final     MuSK Autoantibody, S 02/07/2020 0.00  0.00 - 0.02 nmol/L Final       --  Review of Systems  Wt Readings from Last 3 Encounters:   08/07/20 142 lb (64.4 kg)  "  01/09/20 136 lb (61.7 kg)   12/27/19 136 lb (61.7 kg)     Temp Readings from Last 3 Encounters:   12/17/19 98  F (36.7  C) (Oral)   12/17/19 98.4  F (36.9  C) (Oral)     BP Readings from Last 3 Encounters:   01/09/20 112/68   12/27/19 110/74   12/20/19 120/80     Pulse Readings from Last 3 Encounters:   01/09/20 72   12/27/19 74   12/20/19 78      LMP 08/07/2020  unable to assess today Pain Score: moderate  Pain Location: generalized     As noted in the subjective section above, otherwise a 10 point review of systems is negative. Limited ability to assess given virtual nature of visit. Review of symptoms based entirely on patient's verbal report and what writer is able to assess via camera.  __  Psychiatric Examination:    Appearance: Appears stated age, clean, well groomed, has medium length dark hair, and wearing simple clothing.   Orientation: Patient alert and oriented to person, place, time, and situation  Reliability:  Patient appears to be an adequate historian.   Behavior: Patient makes good eye contact and engages with normal rapport in the interview. There is no evidence of responding to hallucinations or flashbacks.   Speech: Speech is spontaneous and coherent, with a normal rate, rhythm and tone. No repetitiveness or rambling at times.    Language: There are no difficulties with expressive or receptive language as observed throughout the interview.    Mood: Described as \"a little better\".    Affect: Congruent and shows a normal range and  normal level of reactivity.  Judgement: Able to make basic decision regarding safety.  Insight: Good awareness of physical and mental health conditions and aware of needs around care for these.  Gait and station: unable to assess   Thought process: Logical and not ruminative   Thought content: No evidence of delusions or paranoia.  No thoughts of self harm or suicide. No thoughts of harming others.   Associations: Connected  Fund of knowledge: Average  Attention / " Concentration: Able to remain focused during the interview with no distractibility or need for redirection.  Short Term Memory: Grossly intact as evidence by client recalling themes and ideas discussed.  Long Term Memory: Intact  Motor Status: No recent apparent change.  No current tremor.    Assessment / Impression  1. Adjustment disorder with mixed anxiety and depressed mood  - gabapentin (NEURONTIN) 300 MG capsule; Take 1 capsule (300 mg total) by mouth 3 (three) times a day.  Dispense: 90 capsule; Refill: 2  - propranoloL (INDERAL) 10 MG tablet; Take 1 tablet (10 mg total) by mouth 3 (three) times a day as needed (anxiety).  Dispense: 90 tablet; Refill: 0  - sertraline (ZOLOFT) 50 MG tablet; Take 1 tablet (50 mg total) by mouth daily.  Dispense: 30 tablet; Refill: 2    2. Other chronic pain  - gabapentin (NEURONTIN) 300 MG capsule; Take 1 capsule (300 mg total) by mouth 3 (three) times a day.  Dispense: 90 capsule; Refill: 2    3. Depressive disorder  - sertraline (ZOLOFT) 50 MG tablet; Take 1 tablet (50 mg total) by mouth daily.  Dispense: 30 tablet; Refill: 2    4. History of trauma     Anahi Stockton is a 40 y.o. [place] female  who presents for ongoing psychiatric care. Referred to this writer by PCP due to worsening depressive symptoms, repeated requests to extend time off of work, and not working for past several months. Currently seeing Carolyne Figueredo, every other week for individual therapy who sent letter to patient's employer reccomending 10-12 extension of time off work today. PCP planned for patient to return part time mid-July. Anahi meets criteria for adjustment disorder with mixed depressive and anxiety symptoms today. Past medication trials include: Cymbalta and citalopram.     Improved anxiety control and decreased severity of pain symptoms since initiation of gabapentin. Describes occasional dizziness with positional changes but denies side effect being problematic. Denies other side effects.  Hopeful today. Discussed in depth medication and diagnoses.  Positive effect from PRN addition of propranolol for anxiety. No other medication changes indicated today. Patient verbalized understanding and agreement with plan. No other lab work indicated today.    Purposely decreasing appointment frequency from every 2 weeks today. Will continue assessing and updating as needed. Has weekly therapy appointments with positive therapeutic effect. Plan to collaborate with therapist and PCP regarding needs ongoing. Pending referral for neuropsychiatric evaluation to rule out other contributing factors after collaboration with therapist. No new lab work indicated today.      Plan to follow up in 4 weeks for evaluation of current medication trials, lab work, and ongoing psychiatric assessment. Patient educated that they may schedule sooner appointment or contact writer for any worsening or lack of improvement in symptoms.      Patient denies suicidal and homicidal ideation. Not at imminent risk this visit. Educated on need to seek emergent services should they become a risk to themselves or others. Anahi Stockton verbalized understanding and agreement with this safety plan.     Rule out: MDD, dysthemia, MING, adjustment disorder, PTSD, anxiety due to other medical condition, and depression due to other medical condition.     --  Plan  1. Continue to monitor for safety  2. Current Medications  1. Continue sertraline 50mg daily for depression and anxiety  2. Continue propranolol 10mg three times a day PRN for anxiety. Patient educated to hold medication if symptomatic for hypotension  3. Continue gabapentin 300 mg 3 times daily for anxiety and chronic pain  4. Neuroleptic Consent Form Signed n/a  5. Non-Opioid Contract Agreement Form Signed n/a  6. REFILLS: gabapentin x2, propranolol, and sertraline x2  1. Labs ordered this visit: patient asks to defer lab work due to COVID-19 situation. No emergent labwork indicated this  visit. Plan to collect when able.  2. Complete neuropsychiatric testing to evaluate for other contributing factors for current symptoms  3. Continue individual psychotherapy appointments for mood stabilization and nonpharmacologic coping. Collaborate with interdisciplinary care team as needed.  4. ROIs: unable to complete today virtually.   3. Consent to Communicate: unable to complete today virtually.   4. Patient will continue abstinence from drugs and alcohol  5. Patient to return to clinic in 4 weeks for evaluation of medication trials and continued assessment. Ongoing patient psychoeducation regarding chronic illness and treatment .  6. I reviewed the potential risks, side effects, and benefits of all medications with the patient. Patient verbalized understanding and was encouraged to call clinic with further questions or concerns.    START TIME: 11:37 AM  END TIME: 12:05 PM    Phone call duration: 28 minutes with > 50% spent on coordination of care and psycho-education.    Dr. Mónica Bills, DNP, APRN, PMHNP-BC  Nurse Practitioner - Psychiatry

## 2021-06-10 NOTE — PROGRESS NOTES
"Telemedicine Visit: The patient's condition can be safely assessed and treated via synchronous audio and visual telemedicine encounter.      Reason for Telemedicine Visit: Patient unable to travel    Originating Site (Patient Location): Patient's home    Distant Site (Provider Location): Provider Remote Setting- Home Office    Consent:  The patient/guardian has verbally consented to: the potential risks and benefits of telemedicine (video visit) versus in person care; bill my insurance or make self-payment for services provided; and responsibility for payment of non-covered services.     Mode of Communication:  Video Conference via [employer]    As the provider I attest to compliance with applicable laws and regulations related to telemedicine.    Outpatient Psychiatric Follow Up    Date of Service: 8/7/2020    --  Chief Complaint: \"anxiety\"     --  History of Present Illness/Client Impression of Mental Health Consult:    Anahi Stockton is a 40 y.o. female who presents for telephone visit follow up appointment. Last visit occurred on 7/24/20. At that time crossed tapered duloxetine to sertraline.    Since last visit, reports taking sertraline for total of 9 days. States nausea has stopped since discontinuing the duloxetine. Has gained 8 lbs since starting duloxetine. Worries about this as possible side effect of future medications. Discussed in depth nonpharmacologic interventions for anxiety and pain. Encouraged to follow up with PCP until pain clinic able to take new patients. Continues working with individual therapy appointments with Carolyne Figueredo. Received information for neuropsychiatric testing but was told they could not complete at this time due to COVID. Again in-depth discussed illness and symptoms.     States mood is \"anxious\". Rates depression 8/10 and anxiety 9/10. Sleeping approximately 8 hours at night. Feels well rested in the morning. No new appetite or weight concerns. No suicidal and homicidal " ideation. No overt psychosis. No all other psychiatric symptoms. No new physical concerns.     Medication adherence: Reviewed risk/benefits of medication , Patient able to verbalize understanding of side effects  and Patient verbally consents to taking medications  Medication side effects: none   The patient was given information on medications: current prescriptions from writer, sertraline  [place] Prescription Monitoring program: Not indicated for this patient.    Clinical Outcomes Measures:  PHQ-9 Total Score: 23  MING-7: 20    --  Current Medications:  Current Outpatient Medications   Medication Sig Dispense Refill     acetaminophen (TYLENOL) 500 MG tablet Take 500 mg by mouth every 6 (six) hours as needed for pain.       cholecalciferol, vitamin D3, 1,000 unit (25 mcg) tablet Take 2,000 Units by mouth daily.        cyclobenzaprine (FLEXERIL) 10 MG tablet Take 1 tab p.o. qhs (if feeling groggy the following morning, can try taking half a tablet instead or can take earlier the night before). 30 tablet 1     ibuprofen (ADVIL,MOTRIN) 200 MG tablet Take 400 mg by mouth every 6 (six) hours as needed for pain.       multivitamin therapeutic tablet Take 1 tablet by mouth daily.       propranoloL (INDERAL) 10 MG tablet Take 1 tablet (10 mg total) by mouth 3 (three) times a day as needed (anxiety). 90 tablet 0     sertraline (ZOLOFT) 25 MG tablet Take 1 tablet (25 mg total) by mouth daily for 4 days, THEN 2 tablets (50 mg total) daily for 26 days. 56 tablet 0     No current facility-administered medications for this visit.        --  Allergies  No Known Allergies    --  Summary of Diagnostic Studies  No visits with results within 30 Day(s) from this visit.   Latest known visit with results is:   Lab Requisition on 02/07/2020   Component Date Value Ref Range Status     MMA Serum/Plasma, Vitamin B12 Stat* 02/07/2020 0.13  0.00 - 0.40 umol/L Final     ACH Receptor (Muscle) Modulating A* 02/07/2020 0  % Final     ACH  Receptor (Muscle) Binding Anti* 02/07/2020 0.00  <=0.02 nmol/L Final     Striational (Striated Muscle) Anti* 02/07/2020 Negative  <1:120 titer Final     MG Interpretive Comments 02/07/2020 SEE BELOW   Final     CK, Total 02/07/2020 116  30 - 190 U/L Final     Ferritin 02/07/2020 17  10 - 130 ng/mL Final     Magnesium 02/07/2020 1.9  1.8 - 2.6 mg/dL Final     Albumin % 02/07/2020 63.6  51.0 - 67.0 % Final     Albumin  02/07/2020 4.2  3.2 - 4.7 g/dL Final     Alpha 1 % 02/07/2020 2.7  2.0 - 4.0 % Final     Alpha 1 02/07/2020 0.2  0.1 - 0.3 g/dL Final     Alpha 2 % 02/07/2020 10.2  5.0 - 13.0 % Final     Alpha 2 02/07/2020 0.7  0.4 - 0.9 g/dL Final     % Beta 02/07/2020 11.6  10.0 - 17.0 % Final     Beta 02/07/2020 0.8  0.7 - 1.2 g/dL Final     Gamma Globulin % 02/07/2020 11.9  9.0 - 20.0 % Final     Gamma Globulin 02/07/2020 0.8  0.6 - 1.4 g/dL Final     ELP Comment 02/07/2020 Unremarkable protein electrophoresis.     Final     Protein, Total 02/07/2020 6.6  6.0 - 8.0 g/dL Final     Path ICD: 02/07/2020 R20.0   Final     Interpreted By: 02/07/2020 Yusef Solorzano MD   Final     Sed Rate 02/07/2020 6  0 - 20 mm/hr Final     MuSK Autoantibody, S 02/07/2020 0.00  0.00 - 0.02 nmol/L Final       --  Review of Systems  Wt Readings from Last 3 Encounters:   08/07/20 142 lb (64.4 kg)   01/09/20 136 lb (61.7 kg)   12/27/19 136 lb (61.7 kg)     Temp Readings from Last 3 Encounters:   12/17/19 98  F (36.7  C) (Oral)   12/17/19 98.4  F (36.9  C) (Oral)     BP Readings from Last 3 Encounters:   01/09/20 112/68   12/27/19 110/74   12/20/19 120/80     Pulse Readings from Last 3 Encounters:   01/09/20 72   12/27/19 74   12/20/19 78      Wt 142 lb (64.4 kg)   LMP 07/24/2020   BMI 22.92 kg/m   unable to assess today Pain Score: moderate  Pain Location: generalized     As noted in the subjective section above, otherwise a 10 point review of systems is negative. Limited ability to assess given virtual nature of visit. Review of  "symptoms based entirely on patient's verbal report and what writer is able to assess via camera.  __  Psychiatric Examination:    Appearance: Appears stated age, clean, well groomed, has medium length dark hair, and wearing simple clothing.   Orientation: Patient alert and oriented to person, place, time, and situation  Reliability:  Patient appears to be an adequate historian.   Behavior: Patient makes good eye contact and engages with normal rapport in the interview. There is no evidence of responding to hallucinations or flashbacks. Not restless. No tearfulness   Speech: Speech is spontaneous and coherent, with a normal rate, rhythm and tone. Repetitive and rambling at times.    Language: There are no difficulties with expressive or receptive language as observed throughout the interview.    Mood: Described as \"anxious\".    Affect: Congruent and shows a normal range and higher than normal level of reactivity.  Judgement: Able to make basic decision regarding safety.  Insight: Good awareness of physical and mental health conditions and aware of needs around care for these.  Gait and station: unable to assess   Thought process: Ruminative  Thought content: No evidence of delusions or paranoia.  No thoughts of self harm or suicide. No thoughts of harming others.   Associations: Connected  Fund of knowledge: Average  Attention / Concentration: Able to remain focused during the interview with no distractibility or need for redirection.  Short Term Memory: Grossly intact as evidence by client recalling themes and ideas discussed.  Long Term Memory: Intact  Motor Status: No recent apparent change.  No current tremor.    Assessment / Impression  1. Adjustment disorder with mixed anxiety and depressed mood  - gabapentin (NEURONTIN) 300 MG capsule; Take 1 capsule (300 mg total) by mouth 3 (three) times a day.  Dispense: 90 capsule; Refill: 0    2. Other chronic pain  - gabapentin (NEURONTIN) 300 MG capsule; Take 1 capsule " (300 mg total) by mouth 3 (three) times a day.  Dispense: 90 capsule; Refill: 0       Amal SHAWNEE Stockton is a 40 y.o. [place] female  who presents for ongoing psychiatric care. Referred to this writer by PCP due to worsening depressive symptoms, repeated requests to extend time off of work, and not working for past several months. Currently seeing Carolyne Figueredo, every other week for individual therapy who sent letter to patient's employer reccomending 10-12 extension of time off work today. PCP planned for patient to return part time mid-July. Anahi meets criteria for adjustment disorder with mixed depressive and anxiety symptoms today. Past medication trials include: Cymbalta and citalopram.     Significant improvements in past reporting nausea since discontinuation of duloxetine initiation of sertraline.  Discussed in depth medication and diagnoses.  Denies nausea or other side effects from medication today which is new.  Unable to notice therapeutic effects from sertraline medication at this time however it is still too soon after initiation. Positive effect from PRN addition of propranolol for anxiety. Continues to complain of unmanageable anxiety and depressive symptoms.  Discussed adjunctive medications for anxiety and pain management.  Will initiate gabapentin 300 mg 3 times daily for symptoms.  Educated patient on new medication and discussed risks and benefits.  Instructed to hold medication dosing if dizzy or lightheaded.  Patient verbalized understanding and agreement with plan.  No other medication changes at this time.  No other lab work indicated today.    We will continue fairly frequent appointments at this time due to severity of patient's symptoms and per patient's request. Will continue assessing and updating as needed. Plan to collaborate with therapist and PCP regarding needs ongoing. Placed referral for neuropsychiatric evaluation to rule out other contributing factors after recent collaboration  with therapist. No new lab work indicated today.      Plan to follow up in 2 weeks for evaluation of current medication trials, lab work, and ongoing psychiatric assessment. Patient educated that they may schedule sooner appointment or contact writer for any worsening or lack of improvement in symptoms.      Patient denies suicidal and homicidal ideation. Not at imminent risk this visit. Educated on need to seek emergent services should they become a risk to themselves or others. Anahi Stockton verbalized understanding and agreement with this safety plan.     Rule out: MDD, dysthemia, MING, adjustment disorder, PTSD, anxiety due to other medical condition, and depression due to other medical condition.     --  Plan  1. Continue to monitor for safety  2. Current Medications  1. Continue sertraline 50mg daily for depression and anxiety  2. Continue propranolol 10mg three times a day PRN for anxiety. Patient educated to hold medication if symptomatic for hypotension  3. INITIATE gabapentin 300 mg 3 times daily for anxiety and chronic pain  4. Neuroleptic Consent Form Signed n/a  5. Non-Opioid Contract Agreement Form Signed n/a  6. REFILLS: duloxetine, propranolol, and sertraline  1. Labs ordered this visit: patient asks to defer lab work due to COVID-19 situation. No emergent labwork indicated this visit. Plan to collect when able.  2. Complete neuropsychiatric testing to evaluate for other contributing factors for current symptoms  3. Continue individual psychotherapy appointments for mood stabilization and nonpharmacologic coping. Collaborate with interdisciplinary care team as needed.  4. ROIs: unable to complete today virtually.   3. Consent to Communicate: unable to complete today virtually.   4. Patient will continue abstinence from drugs and alcohol  5. Patient to return to clinic in 2 weeks for evaluation of medication trials and continued assessment. Ongoing patient psychoeducation regarding chronic illness and  treatment .  6. I reviewed the potential risks, side effects, and benefits of all medications with the patient. Patient verbalized understanding and was encouraged to call clinic with further questions or concerns.    START TIME: 11:00 AM  END TIME: 11:35 PM    Phone call duration: 35 minutes with > 50% spent on coordination of care and psycho-education.    Dr. Mónica Bills, DNP, APRN, PMHNP-BC  Nurse Practitioner - Psychiatry

## 2021-06-10 NOTE — PROGRESS NOTES
________________________________________  Medications Phoned  to Pharmacy [] yes [x]no  Name of Pharmacist:  List Medications, including dose, quantity and instructions    Medications ordered this visit were e-scribed.  Verified by order class [x] yes  [] no  Gabapentin  Medication changes or discontinuations were communicated to patient's pharmacy: [] yes  [x] no    Dictation completed at time of chart check: [] yes  [x] no    I have checked the documentation for today s encounters and the above information has been reviewed and completed.

## 2021-06-10 NOTE — PROGRESS NOTES
________________________________________  Medications Phoned  to Pharmacy [] yes [x]no  Name of Pharmacist:  List Medications, including dose, quantity and instructions    Medications ordered this visit were e-scribed.  Verified by order class [x] yes  [] no  Gabapentin 300 mg  Propanolol 10 mg  Sertraline 50 mg    Medication changes or discontinuations were communicated to patient's pharmacy: [] yes  [x] no    Dictation completed at time of chart check: [x] yes  [] no    I have checked the documentation for today s encounters and the above information has been reviewed and completed.

## 2021-06-10 NOTE — PATIENT INSTRUCTIONS - HE
"1. Continue medications as prescribed  2. Have your pharmacy contact us for a refill if you are running low on medications (We may ask you to come into clinic to get a refill from the nurse)  3. No alcohol or drug use  4. No driving if sedated  5. Contact the clinic with any questions or concerns   a. Phone: [phone]  b. Fax: [phone]  6. Reach out for help if you feel like hurting yourself or others:   a. [facility]: [address] [place] [address], [address] (phone: [phone])  b. [facility] Suicide Hotline: 639.413.2230   c. Crisis Texting Line: Text \"[place]\" to 910731  d. Call 911 or go to nearest Emergency room   7. Follow up as directed in 1 month, for your appointments, per your After Visit Summary Form    "

## 2021-06-10 NOTE — PATIENT INSTRUCTIONS - HE
"1. START taking gabapentin 300mg three times a day for anxiety and pain  2. Continue other medications as prescribed  3. Have your pharmacy contact us for a refill if you are running low on medications (We may ask you to come into clinic to get a refill from the nurse)  4. No alcohol or drug use  5. No driving if sedated  6. Contact the clinic with any questions or concerns   a. Phone: [phone]  b. Fax: [phone]  7. Reach out for help if you feel like hurting yourself or others:   a. [facility]gent [facility]: [address] [place] [address], [address] (phone: [phone])  b. [facility] Suicide Hotline: 292.979.9319   c. Crisis Texting Line: Text \"[place]\" to 503597  d. Call 911 or go to nearest Emergency room   8. Follow up as directed in 2 weeks, for your appointments, per your After Visit Summary Form    "

## 2021-06-10 NOTE — PROGRESS NOTES
Mental Health Visit Note    Patient: Anahi Stockton    : 1980 MRN: [id number]    2020    Start time: 3:00pm    Stop Time: 3:50pm   Session # 9    Session Type: Patient is presenting for an Individual session.    Anahi Stockton is a 40 y.o. female is being seen today for    Chief Complaint   Patient presents with     MH Follow Up     Psychotherapy follow-up visit for anxiety and depression with history of distressing life events   .     Telemedicine Visit: The patient's condition can be safely assessed and treated via synchronous audio and visual telemedicine encounter.      Reason for Telemedicine Visit: Services only offered telehealth    Originating Site (Patient Location): Patient's home    Distant Site (Provider Location): Provider Remote Setting    Consent:  The patient/guardian has verbally consented to: the potential risks and benefits of telemedicine (video visit) versus in person care; bill my insurance or make self-payment for services provided; and responsibility for payment of non-covered services.     Mode of Communication:  Video Conference via [employer]     As the provider I attest to compliance with applicable laws and regulations related to telemedicine.    Those present for this visit patient and therapist    Follow up in regards to ongoing symptom management of anxiety and depression    New symptoms or complaints: None    Functional Impairment:   Personal: 4  Family: 3  Social: 2  Work: 4    Clinical assessment of mental status:   Anahi Stockton presented on time.   She was oriented x3, open and cooperative, and dressed appropriately for this session and weather. Her memory was Normal cognitive functioning .  Her speech was  Within normal.  Language was congruent with speech.  Concentration and focus is Within normal. Psychosis is not noted or reported. She reports her mood is Anxious and Depressed.  Affect is congruent with speech and is Congruent w/content of speech.  [facility]  "knowledge is adequate. Insight is adequate for therapy.    Suicidal/Homicidal Ideation present: Patient denies suicidal and homicidal ideations/means or plans.     Patient's impression of their current status:   Patient shares that current medication intervention has been helpful for pain but her mood continues to fluctuate.    Patient shares that she has been experiencing more memories from the past associated with distressing emotions and thoughts. She shares about some of these memories and how these experiences impact her anxiety today.  She identifies \"non-stop anxieties\" and worries causing her to feel drained. \"The anticipation that she will have to go through something horrible again.\"  She reports that she does not have any close friends with whom she can be vulnerable. Patient reports \"maybe I have a problem trusting others.\"     Therapist impression of patients current state:   The therapist prompted patient to express thoughts and feelings following a CBT framework. Therapist provided unconditional positive regard and support. Therapist provided more psycho-education about trauma and somatic complaints, normalizing patient experiences and emotions.   Therapist and patient reviewed ground rule (and how this was practiced over the past week):  1) practice radical self-acceptance    Therapist also suggested that patient consider additional ways she might feel seen and heard - and how she might establish stronger connection with others so that she doesn't have to take everything on by herself. Patient struggles to be vulnerable with others still but is open to exploring this in future sessions.     Therapist ended with a mindfulness meditation exercise.     Type of psychotherapeutic technique provided: Client centered and CBT    Progress toward short term goals:Progress as expected, with patient reflecting upon the impact of life events while building trust in the therapeutic process. Patient appeared very " "open to following therapist recommendations.   Patient is practicing her deep breathing skills.    Good progress toward short-term goal of gaining self-awareness - \"I feel like therapy + medication is helping\"    Review of long term goals:   Not done at today's visit and Date of last review 7/13/2020   *sacrifices in relationship - has never processed before     *long-term goal is to return to work     Clinical Global Impressions  Most recent:  Considering your total clinical experience with this particular patient population, how severe are the patient's symptoms at this time?: 4 - Moderately ill  Compared to the patient's condition at the START of treatment, this patient's condition is:: 3 - Minimally improved      Diagnosis:   1. Adjustment disorder with mixed anxiety and depressed mood    no change  R/O Generalized Anxiety Disorder   R/O Major Depressive Disorder   R/O PTSD    Plan and Follow-up:   Therapist will provide resources about the nature of somatic complaints related to difficult life events. Therapist will encourage patient to participate in grief work using narrative therapy techniques. Therapist will coordinate care with patient's psychiatrist. Patient agrees to continue taking medication as prescribed.   Therapist recommended following book for continued education: \"Minding the Body, Mending the Mind\" by Lauren Sanchez - patient purchased this as an audiobook and was recommended to continue reading as therapy homework     HW: practice radical self-acceptance  *HW look up modified yoga for pain and/or chair yoga  *HW also to continue practicing deep breathing techniques  *HW practice positive self-talk - (demonstrate same levels of compassion for self as she does for others)  *HW practice thought re-framing   Ie: \"Just get over it\" to \"it's okay to take a break\"  Ie: \"I can't deal with this\" to \"I'm doing the best that I can\"   Ie: \"my best is not good enough\" to \"I always try my best and I trust " "that my best is good enough\"     *plan to practice deep breathing strategies at the end of scheduled visit for consistent practice and guidance    *will discuss scheduling during the next appt regarding future visits    Discharge Criteria/Planning: Patient will continue with follow-up until therapy can be discontinued without return of signs and symptoms.    I have reviewed the note as documented above.  This accurately captures the substance of my conversation with the patient.    As the provider I attest to compliance with applicable laws and regulations related to telemedicine.  Performed and documented by KI Josue 8/24/2020  "

## 2021-06-10 NOTE — PROGRESS NOTES
Mental Health Visit Note    Patient: Anahi Stockton    : 1980 MRN: [id number]    8/3/2020    Start time: 9:02am    Stop Time: 9:50am   Session # 6    Session Type: Patient is presenting for an Individual session.    Anahi Stockton is a 40 y.o. female is being seen today for    Chief Complaint   Patient presents with     MH Follow Up     Psychotherapy follow-up visit for anxiety and depression   .     Telemedicine Visit: The patient's condition can be safely assessed and treated via synchronous audio and visual telemedicine encounter.      Reason for Telemedicine Visit: Services only offered telehealth    Originating Site (Patient Location): Patient's home    Distant Site (Provider Location): Provider Remote Setting    Consent:  The patient/guardian has verbally consented to: the potential risks and benefits of telemedicine (video visit) versus in person care; bill my insurance or make self-payment for services provided; and responsibility for payment of non-covered services.     Mode of Communication:  Video Conference via [employer]     As the provider I attest to compliance with applicable laws and regulations related to telemedicine.    Those present for this visit patient and therapist    Follow up in regards to ongoing symptom management of anxiety and depression    New symptoms or complaints: None    Functional Impairment:   Personal: 4  Family: 3  Social: 2  Work: 4    Clinical assessment of mental status:   Anahi Stockton presented on time.   She was oriented x3, open and cooperative, and dressed appropriately for this session and weather. Her memory was Normal cognitive functioning .  Her speech was  Within normal.  Language was congruent with speech.  Concentration and focus is Within normal. Psychosis is not noted or reported. She reports her mood is Anxious and Depressed.  Affect is congruent with speech and is Congruent w/content of speech.  Fund of knowledge is adequate. Insight is adequate for  "therapy.    Suicidal/Homicidal Ideation present: Patient denies suicidal and homicidal ideations/means or plans.     Patient's impression of their current status:   Patient shares that \"her battery is still feeling empty with low energy.\" Patient has been thinking about the timing of health problems. Patient is still struggling with some negative self-talk patterns. Patient still feels anxious particularly about her family's health. Patient shares learning how to sit with discomfort and manage the unknown, \"the tough times will pass.\" Patient also shares creating a plan for what is within her control.    Time off from work has been approved through August 31st. She is still struggling with physical problems (pain, low energy, lack of concentration, low motivation).  There is still much anxiety about the potential of returning to work.      Therapist impression of patients current state:   The therapist prompted patient to express thoughts and feelings following a CBT framework. Therapist provided unconditional positive regard and support. Therapist discussed the importance of giving herself credit and using affirmations. Therapist and patient discussed the importance of strengthening friendships.     Therapist guided patient through a deep breathing and mindfulness meditation (\"the bubble\")      Type of psychotherapeutic technique provided: Client centered and CBT    Progress toward short term goals:Progress as expected, with patient reflecting upon the impact of life events while building trust in the therapeutic process. Patient appeared very open to following therapist recommendations.   Patient is practicing her thought re-framing techniques.   Good progress toward short-term goal of gaining self-awareness - \"I feel like it's all connecting\"    Review of long term goals:   Not done at today's visit and Date of last review 7/13/2020     Clinical Global Impressions  First:  Considering your total clinical " "experience with this particular patient population, how severe are the patient's symptoms at this time?: 4 - Moderately ill  Compared to the patient's condition at the START of treatment, this patient's condition is:: 3 - Minimally improved  Most recent:  Considering your total clinical experience with this particular patient population, how severe are the patient's symptoms at this time?: 4 - Moderately ill  Compared to the patient's condition at the START of treatment, this patient's condition is:: 3 - Minimally improved      Clinical Global Impressions Scale Ratings:    1. Considering your total clinical experience with this particular population, how mentally ill is the patient at this time? (which is rated on the following seven-point scale: 1=normal, not at all ill; 2=borderline mentally ill; 3=mildly ill; 4=moderately ill; 5=markedly ill; 6=severely ill; 7=among the most extremely ill patients:) score of 4 today    2. Compared to the patient's condition at admission to the program, currently this patient's condition is: (1=very much improved since the initiation of treatment; 2=much improved; 3=minimally improved; 4=no change from baseline (the initiation of treatment); 5=minimally worse; 6= much worse; 7=very much worse since the initiation of treatment:) score of 3 today      Diagnosis:   1. Adjustment disorder with mixed anxiety and depressed mood    no change  R/O Generalized Anxiety Disorder   R/O Major Depressive Disorder     Plan and Follow-up:   Therapist will provide resources about the nature of somatic complaints related to difficult life events. Therapist will encourage patient to participate in grief work using narrative therapy techniques. Therapist will coordinate care with patient's psychiatrist. Patient agrees to continue taking medication as prescribed.   Therapist recommended following book for continued education: \"Minding the Body, Mending the Mind\" by Lauren Sanchez - patient purchased " "this as an audiobook and was recommended to continue reading as therapy homework   *HW also to continue practicing deep breathing techniques  *HW practice positive self-talk - (demonstrate same levels of compassion for self as she does for others)  *HW practice thought re-framing   Ie: \"Just get over it\" to \"it's okay to take a break\"  Ie: \"I can't deal with this\" to \"I'm doing the best that I can\"   Ie: \"my best is not good enough\" to \"I always try my best and I trust that my best is good enough\"     *plan to practice deep breathing strategies at the end of scheduled visit for consistent practice and guidance    Discharge Criteria/Planning: Patient will continue with follow-up until therapy can be discontinued without return of signs and symptoms.    I have reviewed the note as documented above.  This accurately captures the substance of my conversation with the patient.    As the provider I attest to compliance with applicable laws and regulations related to telemedicine.  Performed and documented by KI Josue 8/3/2020  "

## 2021-06-10 NOTE — PROGRESS NOTES
Mental Health Visit Note    Patient: Anahi Stockton    : 1980 MRN: [id number]    2020    Start time: 3:02pm    Stop Time: 3:50pm   Session # 5    Session Type: Patient is presenting for an Individual session.    Anahi Stockton is a 40 y.o. female is being seen today for    Chief Complaint   Patient presents with     MH Follow Up     Psychotherapy follow-up visit for anxiety and depression   .     Telemedicine Visit: The patient's condition can be safely assessed and treated via synchronous audio and visual telemedicine encounter.      Reason for Telemedicine Visit: Services only offered telehealth    Originating Site (Patient Location): Patient's home    Distant Site (Provider Location): Provider Remote Setting    Consent:  The patient/guardian has verbally consented to: the potential risks and benefits of telemedicine (video visit) versus in person care; bill my insurance or make self-payment for services provided; and responsibility for payment of non-covered services.     Mode of Communication:  Video Conference via [employer]     As the provider I attest to compliance with applicable laws and regulations related to telemedicine.    Those present for this visit patient and therapist    Follow up in regards to ongoing symptom management of anxiety    New symptoms or complaints: guilt, shame and unhelpful beliefs; lack of self-confidence; unrealistic expectations    Functional Impairment:   Personal: 4  Family: 3  Social: 2  Work: 4    Clinical assessment of mental status:   Anahi Stockton presented on time.   She was oriented x3, open and cooperative, and dressed appropriately for this session and weather. Her memory was Normal cognitive functioning .  Her speech was  Within normal.  Language was congruent with speech.  Concentration and focus is Within normal. Psychosis is not noted or reported. She reports her mood is Anxious and Depressed.  Affect is congruent with speech and is Congruent w/content of  "speech.  Fund of knowledge is adequate. Insight is adequate for therapy.    Suicidal/Homicidal Ideation present: Patient denies suicidal and homicidal ideations/means or plans.     Patient's impression of their current status:   Patient shares that she is feeling quite frustrated about the overall picture in regards to her health - \"not much change.\" Patient expresses feelings of guilt due to symptoms during challenging time. Patient doesn't believe she has enough energy to \"be there for her kids - I need to be stronger, more energetic and more involved.\" Patient does admit that she's struggled to manage anxious thoughts for many years. She has always struggled with low self-esteem and lack of confidence (imposter syndrome).     Therapist impression of patients current state:   The therapist prompted patient to express thoughts and feelings following a CBT framework. Therapist provided unconditional positive regard and support. Therapist guided patient through thought re-framing exercises.   Ie: \"Just get over it\" to \"it's okay to take a break\"  Ie: \"I can't deal with this\" to \"I'm doing the best that I can\"   Ie: \"my best is not good enough\" to \"I always try my best and I trust that my best is good enough\"     Therapist guided patient through a deep breathing and mindfulness meditation (the stream - letting go)      Type of psychotherapeutic technique provided: Client centered and CBT    Progress toward short term goals:Progress as expected, with patient reflecting upon the impact of life events while building trust in the therapeutic process. Patient appeared very open to following therapist recommendations.   Patient is practicing her deep breathing techniques.   Good progress toward short-term goal of gaining self-awareness - \"I feel like it's all connecting\"    Review of long term goals:   Not done at today's visit and Date of last review 7/13/2020     Clinical Global Impressions  First:  Considering your total " "clinical experience with this particular patient population, how severe are the patient's symptoms at this time?: 4 - Moderately ill  Compared to the patient's condition at the START of treatment, this patient's condition is:: 4 - No change  Most recent:  Considering your total clinical experience with this particular patient population, how severe are the patient's symptoms at this time?: 4 - Moderately ill  Compared to the patient's condition at the START of treatment, this patient's condition is:: 4 - No change      Clinical Global Impressions Scale Ratings:    1. Considering your total clinical experience with this particular population, how mentally ill is the patient at this time? (which is rated on the following seven-point scale: 1=normal, not at all ill; 2=borderline mentally ill; 3=mildly ill; 4=moderately ill; 5=markedly ill; 6=severely ill; 7=among the most extremely ill patients:) score of 4 today    2. Compared to the patient's condition at admission to the program, currently this patient's condition is: (1=very much improved since the initiation of treatment; 2=much improved; 3=minimally improved; 4=no change from baseline (the initiation of treatment); 5=minimally worse; 6= much worse; 7=very much worse since the initiation of treatment:) score of 4 today      Diagnosis:   1. Adjustment disorder with mixed anxiety and depressed mood    no change  R/O Generalized Anxiety Disorder   R/O Major Depressive Disorder     Plan and Follow-up:   Therapist will provide resources about the nature of somatic complaints related to difficult life events. Therapist will encourage patient to participate in grief work using narrative therapy techniques. Therapist will coordinate care with patient's psychiatrist. Patient agrees to continue taking medication as prescribed.   Therapist recommended following book for continued education: \"Minding the Body, Mending the Mind\" by Lauren Sanchez - patient purchased this as an " audiobook and was recommended to continue reading as therapy homework   *HW also to continue practicing deep breathing techniques  *HW practice positive self-talk - (demonstrate same levels of compassion for self as she does for others)  *HW practice thought re-framing     *plan to practice deep breathing strategies at the end of scheduled visit for consistent practice and guidance    Discharge Criteria/Planning: Patient will continue with follow-up until therapy can be discontinued without return of signs and symptoms.    I have reviewed the note as documented above.  This accurately captures the substance of my conversation with the patient.    As the provider I attest to compliance with applicable laws and regulations related to telemedicine.  Performed and documented by KI Josue 7/27/2020

## 2021-06-11 NOTE — PROGRESS NOTES
Mental Health Visit Note    Patient: Anahi Stockton    : 1980 MRN: [id number]    2020    Start time: 4:00pm    Stop Time: 4:40pm   Session # 10    Session Type: Patient is presenting for an Individual session.    Anahi Stockton is a 40 y.o. female is being seen today for    Chief Complaint   Patient presents with     MH Follow Up     Psychotherapy follow-up visit for anxiety and depression   .     Telemedicine Visit: The patient's condition can be safely assessed and treated via synchronous audio and visual telemedicine encounter.      Reason for Telemedicine Visit: Services only offered telehealth    Originating Site (Patient Location): Patient's home    Distant Site (Provider Location): Provider Remote Setting    Consent:  The patient/guardian has verbally consented to: the potential risks and benefits of telemedicine (video visit) versus in person care; bill my insurance or make self-payment for services provided; and responsibility for payment of non-covered services.     Mode of Communication:  Video Conference via [employer]     As the provider I attest to compliance with applicable laws and regulations related to telemedicine.    Those present for this visit include patient and therapist    Follow up in regards to ongoing symptom management of anxiety and depression    New symptoms or complaints: None    Functional Impairment:   Personal: 4  Family: 3  Social: 2  Work: 4    Clinical assessment of mental status:   Anahi Stockton presented on time.   She was oriented x3, open and cooperative, and dressed appropriately for this session and weather. Her memory was Normal cognitive functioning .  Her speech was  Within normal.  Language was congruent with speech.  Concentration and focus is Within normal. Psychosis is not noted or reported. She reports her mood is Anxious and Depressed.  Affect is congruent with speech and is Congruent w/content of speech.  Fund of knowledge is adequate. Insight is  "adequate for therapy.    Suicidal/Homicidal Ideation present: Patient denies suicidal and homicidal ideations/means or plans.     Patient's impression of their current status:   Patient reports she isn't feeling well today with a bad headache. She reports that the last two weeks have been \"an emotional roller coaster.\" She continues to feel nervous and worried about making wrong choices for her girls in regards to their education journey. She feels very scared about making the wrong decision. She feels that her girls are struggling with distance learning - not academics but lack of engagement and socialization.   Patient reports that \"the fear is controlling me.\" - \"I don't want my family to go through anything again that we've already gone through - I don't want any more bad experiences to happen.\" SO big question is really to learn how to manage things outside of her control.     Therapist impression of patients current state:   The therapist prompted patient to express thoughts and feelings following a CBT framework. Therapist provided unconditional positive regard and support. Therapist provided more psycho-education about trauma and somatic complaints, normalizing patient experiences and emotions.   Therapist recommended that patient seek out additional sources of support for her daughters.       Type of psychotherapeutic technique provided: Client centered and CBT    Progress toward short term goals:Progress as expected, with patient reflecting upon the impact of life events while building trust in the therapeutic process. Patient appeared very open to following therapist recommendations. She is working on therapy skills outside of session.     Review of long term goals:   Not done at today's visit and Date of last review 7/13/2020   *sacrifices in relationship - has never processed before     *long-term goal is to return to work     Clinical Global Impressions  Most recent:  Considering your total clinical " "experience with this particular patient population, how severe are the patient's symptoms at this time?: 4 - Moderately ill  Compared to the patient's condition at the START of treatment, this patient's condition is:: 4 - No change      Diagnosis:   1. Adjustment disorder with mixed anxiety and depressed mood    no change  R/O Generalized Anxiety Disorder   R/O Major Depressive Disorder   R/O PTSD    Plan and Follow-up:   Therapist will provide resources about the nature of somatic complaints related to difficult life events. Therapist will encourage patient to participate in grief work using narrative therapy techniques. Therapist will coordinate care with patient's psychiatrist. Patient agrees to continue taking medication as prescribed.   Therapist recommended following book for continued education: \"Minding the Body, Mending the Mind\" by Lauren Sanchez - patient purchased this as an audiobook and was recommended to continue reading as therapy homework     HW: practice radical self-acceptance  *HW look up modified yoga for pain and/or chair yoga  *HW also to continue practicing deep breathing techniques  *HW practice positive self-talk - (demonstrate same levels of compassion for self as she does for others)  *HW practice thought re-framing   Ie: \"Just get over it\" to \"it's okay to take a break\"  Ie: \"I can't deal with this\" to \"I'm doing the best that I can\"   Ie: \"my best is not good enough\" to \"I always try my best and I trust that my best is good enough\"     *plan to practice deep breathing strategies at the end of scheduled visit for consistent practice and guidance        Discharge Criteria/Planning: Patient will continue with follow-up until therapy can be discontinued without return of signs and symptoms.    I have reviewed the note as documented above.  This accurately captures the substance of my conversation with the patient.    As the provider I attest to compliance with applicable laws and " regulations related to telemedicine.  Performed and documented by KI Josue 9/8/2020

## 2021-06-11 NOTE — PROGRESS NOTES
________________________________________  Medications Phoned  to Pharmacy [] yes [x]no  Name of Pharmacist:  List Medications, including dose, quantity and instructions    Medications ordered this visit were e-scribed.  Verified by order class [x] yes  [] no  Gabapentin 300 mg  Propanolol 10 mg    Medication changes or discontinuations were communicated to patient's pharmacy: [] yes  [x] no    Dictation completed at time of chart check: [x] yes  [] no    I have checked the documentation for today s encounters and the above information has been reviewed and completed.

## 2021-06-11 NOTE — PROGRESS NOTES
This video/telephone visit will be conducted via a call between you and your physician/provider. We have found that certain health care needs can be provided without the need for an in-person physical exam. This service lets us provide the care you need with a video /telephone conversation. If a prescription is necessary we can send it directly to your pharmacy. If lab work is needed we can place an order for that and you can then stop by our lab to have the test done at a later time.    Just as we bill insurance for in-person visits, we also bill insurance for video/telephone visits. If you have questions about your insurance coverage, we recommend that you speak with your insurance company.    Patient has given verbal consent for video/Telephone visit? Yes  Patient would like the video visit invitation sent by: [id number], if connection issues, please call:  [phone]  Patient denies SI or intent  CMA/LPSYD HILL CMA    Patient verified allergies, medications and pharmacy via phone. PHQ: 19 and MING: 12 done verbally with writer. Patient states she is ready for visit.

## 2021-06-11 NOTE — PATIENT INSTRUCTIONS - HE
"1. START taking gabapentin 600mg three times a day  2. Continue medications as prescribed  3. Have your pharmacy contact us for a refill if you are running low on medications (We may ask you to come into clinic to get a refill from the nurse)  4. No alcohol or drug use  5. No driving if sedated  6. Contact the clinic with any questions or concerns   a. Phone: [phone]  b. Fax: [phone]  7. Reach out for help if you feel like hurting yourself or others:   a. [facility]: [address] [place] [address], [address] (phone: [phone])  b. [facility] Suicide Hotline: [phone]   c. Crisis Texting Line: Text \"[place]\" to 804632  d. Call 911 or go to nearest Emergency room   8. Follow up as directed in 1 month, for your appointments, per your After Visit Summary Form    "

## 2021-06-11 NOTE — PROGRESS NOTES
"Telemedicine Visit: The patient's condition can be safely assessed and treated via synchronous audio and visual telemedicine encounter.      Reason for Telemedicine Visit: Patient unable to travel    Originating Site (Patient Location): Patient's home    Distant Site (Provider Location): Provider Remote Setting- Home Office    Consent:  The patient/guardian has verbally consented to: the potential risks and benefits of telemedicine (video visit) versus in person care; bill my insurance or make self-payment for services provided; and responsibility for payment of non-covered services.     Mode of Communication:  Video Conference via "[employer]"    As the provider I attest to compliance with applicable laws and regulations related to telemedicine.    Outpatient Psychiatric Follow Up    Date of Service: 9/30/2020    --  Chief Complaint: \"I'm doing good\"     --  History of Present Illness/Client Impression of Mental Health Consult:    Anahi Stockton is a 40 y.o. female who presents for virtual visit follow up appointment. Last visit occurred on 8/21/20. At that time increased gabapentin three times a day for anxiety.    Feels significantly less anxious and pain since increase of gabapentin dose. Also feels generally less depressed since improvements in anxiety. Denies side effects from any medications at this time. Would like to explore future medication alterations with gabapentin versus other medications. Had received medical leave from work until end of November. Asking to meet with writer to discuss return to work closer to that time. Continues feeling positive effects of therapy appointments.     States mood is \"good\". Rates depression minimal and anxiety moderate. Sleeping adequately at night. Feels well rested in the morning. No new appetite or weight concerns. No suicidal and homicidal ideation. No overt psychosis. No all other psychiatric symptoms. No new physical concerns.     Medication adherence: Reviewed " risk/benefits of medication , Patient able to verbalize understanding of side effects  and Patient verbally consents to taking medications  Medication side effects: none   The patient was given information on medications: current prescriptions from writer  [place] Prescription Monitoring program: Not indicated for this patient.    Clinical Outcomes Measures:  PHQ-9 Total Score: 19  MING-7: 12    --  Current Medications:  Current Outpatient Medications   Medication Sig Dispense Refill     acetaminophen (TYLENOL) 500 MG tablet Take 500 mg by mouth every 6 (six) hours as needed for pain.       cholecalciferol, vitamin D3, 1,000 unit (25 mcg) tablet Take 2,000 Units by mouth daily.        cyclobenzaprine (FLEXERIL) 10 MG tablet Take 1 tab p.o. qhs (if feeling groggy the following morning, can try taking half a tablet instead or can take earlier the night before). 30 tablet 1     gabapentin (NEURONTIN) 300 MG capsule Take 1 capsule (300 mg total) by mouth 3 (three) times a day. 90 capsule 2     ibuprofen (ADVIL,MOTRIN) 200 MG tablet Take 400 mg by mouth every 6 (six) hours as needed for pain.       multivitamin therapeutic tablet Take 1 tablet by mouth daily.       propranoloL (INDERAL) 10 MG tablet Take 1 tablet (10 mg total) by mouth 3 (three) times a day as needed (anxiety). 90 tablet 0     sertraline (ZOLOFT) 50 MG tablet Take 1 tablet (50 mg total) by mouth daily. 30 tablet 2     No current facility-administered medications for this visit.        --  Allergies  No Known Allergies    --  Summary of Diagnostic Studies  No visits with results within 30 Day(s) from this visit.   Latest known visit with results is:   Lab Requisition on 02/07/2020   Component Date Value Ref Range Status     MMA Serum/Plasma, Vitamin B12 Stat* 02/07/2020 0.13  0.00 - 0.40 umol/L Final     ACH Receptor (Muscle) Modulating A* 02/07/2020 0  % Final     ACH Receptor (Muscle) Binding Anti* 02/07/2020 0.00  <=0.02 nmol/L Final     Striational  (Striated Muscle) Anti* 02/07/2020 Negative  <1:120 titer Final     MG Interpretive Comments 02/07/2020 SEE BELOW   Final     CK, Total 02/07/2020 116  30 - 190 U/L Final     Ferritin 02/07/2020 17  10 - 130 ng/mL Final     Magnesium 02/07/2020 1.9  1.8 - 2.6 mg/dL Final     Albumin % 02/07/2020 63.6  51.0 - 67.0 % Final     Albumin  02/07/2020 4.2  3.2 - 4.7 g/dL Final     Alpha 1 % 02/07/2020 2.7  2.0 - 4.0 % Final     Alpha 1 02/07/2020 0.2  0.1 - 0.3 g/dL Final     Alpha 2 % 02/07/2020 10.2  5.0 - 13.0 % Final     Alpha 2 02/07/2020 0.7  0.4 - 0.9 g/dL Final     % Beta 02/07/2020 11.6  10.0 - 17.0 % Final     Beta 02/07/2020 0.8  0.7 - 1.2 g/dL Final     Gamma Globulin % 02/07/2020 11.9  9.0 - 20.0 % Final     Gamma Globulin 02/07/2020 0.8  0.6 - 1.4 g/dL Final     ELP Comment 02/07/2020 Unremarkable protein electrophoresis.     Final     Protein, Total 02/07/2020 6.6  6.0 - 8.0 g/dL Final     Path ICD: 02/07/2020 R20.0   Final     Interpreted By: 02/07/2020 Yusef Solorzano MD   Final     Sed Rate 02/07/2020 6  0 - 20 mm/hr Final     MuSK Autoantibody, S 02/07/2020 0.00  0.00 - 0.02 nmol/L Final       --  Review of Systems  Wt Readings from Last 3 Encounters:   08/07/20 142 lb (64.4 kg)   01/09/20 136 lb (61.7 kg)   12/27/19 136 lb (61.7 kg)     Temp Readings from Last 3 Encounters:   12/17/19 98  F (36.7  C) (Oral)   12/17/19 98.4  F (36.9  C) (Oral)     BP Readings from Last 3 Encounters:   01/09/20 112/68   12/27/19 110/74   12/20/19 120/80     Pulse Readings from Last 3 Encounters:   01/09/20 72   12/27/19 74   12/20/19 78      LMP 08/30/2020  unable to assess today Pain Score: moderate  Pain Location: generalized     As noted in the subjective section above, otherwise a 10 point review of systems is negative. Limited ability to assess given virtual nature of visit. Review of symptoms based entirely on patient's verbal report and what writer is able to assess via camera.  __  Psychiatric  "Examination:    Appearance: Appears stated age, clean, well groomed, has medium length dark hair, jewelry, and wearing simple clothing.   Orientation: Patient alert and oriented to person, place, time, and situation  Reliability:  Patient appears to be an adequate historian.   Behavior: Patient makes good eye contact and engages with normal rapport in the interview. There is no evidence of responding to hallucinations or flashbacks.   Speech: Speech is spontaneous and coherent, with a normal rate, rhythm and tone. No repetitiveness or rambling at times.    Language: There are no difficulties with expressive or receptive language as observed throughout the interview.    Mood: Described as \"good\".    Affect: Congruent and shows a normal range and  level of reactivity.  Judgement: Able to make basic decision regarding safety.  Insight: Good awareness of physical and mental health conditions and aware of needs around care for these.  Gait and station: unable to assess   Thought process: Logical   Thought content: No evidence of delusions or paranoia.  No thoughts of self harm or suicide. No thoughts of harming others.   Associations: Connected  Fund of knowledge: Average  Attention / Concentration: Able to remain focused during the interview with no distractibility or need for redirection.  Short Term Memory: Grossly intact as evidence by client recalling themes and ideas discussed.  Long Term Memory: Intact  Motor Status: No recent apparent change.  No current tremor.    Assessment / Impression  1. Adjustment disorder with mixed anxiety and depressed mood  - propranoloL (INDERAL) 10 MG tablet; Take 1 tablet (10 mg total) by mouth 3 (three) times a day as needed (anxiety).  Dispense: 90 tablet; Refill: 0  - gabapentin (NEURONTIN) 300 MG capsule; Take 2 capsules (600 mg total) by mouth 3 (three) times a day.  Dispense: 180 capsule; Refill: 2    2. Other chronic pain  - gabapentin (NEURONTIN) 300 MG capsule; Take 2 " capsules (600 mg total) by mouth 3 (three) times a day.  Dispense: 180 capsule; Refill: 2     Anahi Stockton is a 40 y.o. [place] female  who presents for ongoing psychiatric care. Referred to this writer by PCP due to worsening depressive symptoms, repeated requests to extend time off of work, and not working for past several months. Currently seeing Carolyne Figueredo, every other week for individual therapy who sent letter to patient's employer reccomending 10-12 extension of time off work today. PCP planned for patient to return part time mid-July. Anahi meets criteria for adjustment disorder with mixed depressive and anxiety symptoms today. Past medication trials include: Cymbalta and citalopram.     Improved anxiety control and decreased severity of pain symptoms since gabapentin being used three times a day consistently. Also noted improvements to depression secondary. Denies any side effects. Will increase dose today due to residual symptoms. Positive effect from PRN addition of propranolol for anxiety. No other medication changes indicated today. Patient verbalized understanding and agreement with plan. No other lab work indicated today.    Has weekly therapy appointments with positive therapeutic effect. Plan to collaborate with therapist and PCP regarding needs ongoing. Pending referral for neuropsychiatric evaluation to rule out other contributing factors after collaboration with therapist. Pending pain clinic referral.      Plan to follow up in 4 weeks for evaluation of current medication trials, lab work, and ongoing psychiatric assessment. Patient educated that they may schedule sooner appointment or contact writer for any worsening or lack of improvement in symptoms.      Patient denies suicidal and homicidal ideation. Not at imminent risk this visit. Educated on need to seek emergent services should they become a risk to themselves or others. Anahi Stockton verbalized understanding and agreement with this  safety plan.       --  Plan  1. Continue to monitor for safety  2. Current Medications  1. Continue sertraline 50mg daily for depression and anxiety  2. Continue propranolol 10mg three times a day PRN for anxiety. Patient educated to hold medication if symptomatic for hypotension  3. TITRATE gabapentin 300 mg to 600mg 3 times daily for anxiety and chronic pain  4. Neuroleptic Consent Form Signed n/a  5. Non-Opioid Contract Agreement Form Signed n/a  6. REFILLS: gabapentin and propranolol   1. Labs ordered this visit: patient asks to defer lab work due to COVID-19 situation. No emergent labwork indicated this visit. Plan to collect when able.  2. Complete neuropsychiatric testing to evaluate for other contributing factors for current symptoms  3. Continue individual psychotherapy appointments for mood stabilization and nonpharmacologic coping. Collaborate with interdisciplinary care team as needed.  4. ROIs: unable to complete today virtually.   3. Consent to Communicate: unable to complete today virtually.   4. Patient will continue abstinence from drugs and alcohol  5. Patient to return to clinic in 4 weeks for evaluation of medication trials and continued assessment. Ongoing patient psychoeducation regarding chronic illness and treatment .  6. I reviewed the potential risks, side effects, and benefits of all medications with the patient. Patient verbalized understanding and was encouraged to call clinic with further questions or concerns.    START TIME: 4:05 PM  END TIME: 4:30 PM    Phone call duration: 25 minutes with > 75% spent on coordination of care and psycho-education.    Dr. Mónica Bills, DNP, APRN, PMHNP-BC  Nurse Practitioner - Psychiatry

## 2021-06-11 NOTE — PROGRESS NOTES
"Anahi Stockton is a 40 y.o. female who is being evaluated via a billable video visit.      The patient has been notified of following:     \"This video visit will be conducted via a call between you and your physician/provider. We have found that certain health care needs can be provided without the need for an in-person physical exam.  This service lets us provide the care you need with a video conversation.  If a prescription is necessary we can send it directly to your pharmacy.  If lab work is needed we can place an order for that and you can then stop by our lab to have the test done at a later time.    Video visits are billed at different rates depending on your insurance coverage. Please reach out to your insurance provider with any questions.    If during the course of the call the physician/provider feels a video visit is not appropriate, you will not be charged for this service.\"    Patient has given verbal consent to a Video visit? Yes  How would you like to obtain your AVS? AVS Preference: [employer].  If dropped by the video visit, the video invitation should be sent to: Text to cell phone: [phone]  Will anyone else be joining your video visit? No          Video-Visit Details    Type of service:  Video Visit    Originating Location (pt. Location): Home    Distant Location (provider location):  [facility]     Platform used for Video Visit: [facility]    ASSESSMENT AND PLAN:  Anahi Stockton 40 y.o. female is seen here on 09/03/20 for evaluation of polymyalgia, fatigue, numbness tingling, new onset of headaches, phono and photo phobia, would poor quality of sleep, mental fog, without swelling of any of her joints, negative rheumatoid factor OMID Lyme serology, no personal or family history of psoriasis or inflammatory bowel disease.  Her physician so far have suspected fibromyalgia.  There are several features which are in keeping with this condition.  She is already tried duloxetine without help.  " Recently started on gabapentin.  Today we had a detailed discussion.  I would not plan to her that it would appear that many of her features are indeed consistent with fibromyalgia however I would would like her to have further work-up including an in person examination as there are some overlap syndromes to keep in mind.  She is very reluctant at this stage to come in in person for any labs or evaluation.  She will call us when she is more comfortable with this plan.        Diagnoses and all orders for this visit:    Polyarthralgia  -     CCP Antibodies  -     Hepatitis C Antibody (Anti-HCV)  -     CK Total    Polymyalgia (H)  -     CCP Antibodies  -     Hepatitis C Antibody (Anti-HCV)  -     CK Total      HISTORY OF PRESENTING ILLNESS:  Anahi Stockton, 40 y.o., female is here for evaluation of widespread myalgias, polyarthralgias.  She reports that it was in 2009 pain that she had which she  time thought to be a common cold/viral illness.  Typically her viruses symptoms lasts no more than few days.  This lasted almost a month.  After she recovered from this to weeks past by during which she felt well.  She works as a .  She was able to go back to work.  She felt nearly back to her usual active self, indicating of family, working out.  Typically she would have quite a bit of energy to manage these issues.    In December she started experiencing new symptoms.  The original symptoms were numbness and tingling in her toes then pain in her muscles in the lower extremities than the upper extremities.  Then she started hurting in her hands and toes and gradually almost all joints were affected, and muscle groups.  She would have ongoing pain.  There was no diurnal variation.  She had difficulty sleeping.  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  She had work-up done  at her primary physician, neurology, and rheumatology.  She has followed up with a psychiatrist and for therapy given how bad she has felt emotionally and psychiatric issues since the symptoms began.  She was given duloxetine initially at a lower dose and increase that did not help plus she was not able to tolerate.  Recently gabapentin is been started she is beginning to sleep better with that.  She reports no joints are swollen.  She has not had any fluid obtained from any of the joints.  She does not have personal or family history of psoriasis ulcerative colitis Crohn's disease.  There is no family history of rheumatoid arthritis.  And lupus.  Her  has had chemotherapy for cancer.  She had work-up done was negative for Lyme serology, no acute phase response, negative OMID, rheumatoid factor.  She describes no rash pleuritic symptoms mouth ulcers photosensitive rash seizure disorder.  She has felt depressed since the beginning of the symptoms which is in sharp contrast to where she was before it all happened.  During the work-up for headache which is also a new phenomenon for her aneurysm was found she was seen in neurosurgery and reassured her.  She describes no features suggestive of raynauds.. Rest of the 13 system ROS is negative.     ALLERGIES:Patient has no known allergies.    PAST MEDICAL/ACTIVE PROBLEMS/MEDICATION/ FAMILY HISTORY/SOCIAL DATA:  The patient has a family history of  Past Medical History:   Diagnosis Date     Aneurysm (H)      Depression      Social History     Tobacco Use   Smoking Status Never Smoker   Smokeless Tobacco Never Used     Patient Active Problem List   Diagnosis     Allergic rhinitis     Depressive disorder     Anxiety     Other chronic pain     Current Outpatient Medications   Medication Sig Dispense Refill     acetaminophen (TYLENOL) 500 MG tablet Take 500 mg by mouth every 6 (six) hours as needed for pain.       cholecalciferol, vitamin D3, 1,000 unit (25 mcg) tablet  Take 2,000 Units by mouth daily.        cyclobenzaprine (FLEXERIL) 10 MG tablet Take 1 tab p.o. qhs (if feeling groggy the following morning, can try taking half a tablet instead or can take earlier the night before). 30 tablet 1     gabapentin (NEURONTIN) 300 MG capsule Take 1 capsule (300 mg total) by mouth 3 (three) times a day. 90 capsule 2     ibuprofen (ADVIL,MOTRIN) 200 MG tablet Take 400 mg by mouth every 6 (six) hours as needed for pain.       multivitamin therapeutic tablet Take 1 tablet by mouth daily.       propranoloL (INDERAL) 10 MG tablet Take 1 tablet (10 mg total) by mouth 3 (three) times a day as needed (anxiety). 90 tablet 0     sertraline (ZOLOFT) 50 MG tablet Take 1 tablet (50 mg total) by mouth daily. 30 tablet 2     No current facility-administered medications for this visit.        COMPREHENSIVE EXAMINATION:  There were no vitals filed for this visit.  A well appearing alert oriented female.   Examination of the eyes revealed no redness, obvious scleromalacia.  ENT examination shows no nasal deformity such as of saddle type, external ear without signs of inflammation.  Cardiopulmonary examination without obvious signs of dyspnea, no wheezing is audible, no cyanosis.  There is no finger clubbing.  Skin examination performed for heliotrope, malar area eruption periungual erythema, lupus pernio.  Neurological examination shows the speech is fluent, no facial asymmetry, muscle power in the upper extremities proximally appears to be normal.  In the psychiatric examination the memory, orientation, attention, affect were observable.  Joint examination of the DIPs, PIPs, MCPs, IP joints of the thumbs, wrists, elbows, for swelling, range of motion, for shoulders range of motion evaluated.The salient  findings are: She appears alert and oriented.  Speech is fluent.  No Maller area eruption.  There is no periorbital heliotrope.  She does not have dactylitis of digits.  She is able to fully flex the  digits into fist bilaterally.  Her abduction of the shoulders is normal.    LAB / IMAGING DATA:  ALT   Date Value Ref Range Status   12/20/2019 15 0 - 45 U/L Final     Albumin   Date Value Ref Range Status   12/20/2019 4.5 3.5 - 5.0 g/dL Final     Creatinine   Date Value Ref Range Status   12/20/2019 0.74 0.60 - 1.10 mg/dL Final   12/17/2019 0.80 0.60 - 1.10 mg/dL Final       WBC   Date Value Ref Range Status   12/20/2019 9.0 4.0 - 11.0 thou/uL Final   12/17/2019 7.7 4.0 - 11.0 thou/uL Final     Hemoglobin   Date Value Ref Range Status   12/20/2019 14.3 12.0 - 16.0 g/dL Final   12/17/2019 14.2 12.0 - 16.0 g/dL Final     Platelets   Date Value Ref Range Status   12/20/2019 245 140 - 440 thou/uL Final   12/17/2019 232 140 - 440 thou/uL Final       Lab Results   Component Value Date    RF <15.0 12/20/2019    SEDRATE 6 02/07/2020     26-minute consultation  4:18pm to 4: 44pm

## 2021-06-11 NOTE — PROGRESS NOTES
Mental Health Visit Note    Patient: Anahi Stockton    : 1980 MRN: [id number]    2020    Start time: 3:10pm    Stop Time: 3:55pm   Session # 12    Session Type: Patient is presenting for an Individual session.    Anahi Stockton is a 40 y.o. female is being seen today for    Chief Complaint   Patient presents with     MH Follow Up     Psychotherapy follow-up visit for anxiety and depression    .     Telemedicine Visit: The patient's condition can be safely assessed and treated via synchronous audio and visual telemedicine encounter.      Reason for Telemedicine Visit: Services only offered telehealth    Originating Site (Patient Location): Patient's home    Distant Site (Provider Location): Provider Remote Setting    Consent:  The patient/guardian has verbally consented to: the potential risks and benefits of telemedicine (video visit) versus in person care; bill my insurance or make self-payment for services provided; and responsibility for payment of non-covered services.     Mode of Communication:  Video Conference via [employer]     As the provider I attest to compliance with applicable laws and regulations related to telemedicine.    Those present for this visit include patient and therapist    Follow up in regards to ongoing symptom management of anxiety and depression    New symptoms or complaints: None    Functional Impairment:   Personal: 4  Family: 3  Social: 2  Work: 4    Clinical assessment of mental status:   Anahi Stockton presented on time.   She was oriented x3, open and cooperative, and dressed appropriately for this session and weather. Her memory was Normal cognitive functioning .  Her speech was  Within normal.  Language was congruent with speech.  Concentration and focus is Within normal. Psychosis is not noted or reported. She reports her mood is Anxious and Depressed.  Affect is congruent with speech and is Congruent w/content of speech.  Fund of knowledge is adequate. Insight is  "adequate for therapy.    Suicidal/Homicidal Ideation present: Patient denies suicidal and homicidal ideations/means or plans.     Patient's impression of their current status:   Patient reports that she has another headache today - she gets them 2-3x/week - she believes that stress does trigger physical symptoms. Overall patient reports that she feels that she is improving mentally. She reports that she is attempting to build back her physical strength.   Both girls started hybrid learning end of last week - this decision was significant for her family. \"for now this is the right decision.\" She reports that her battery is slowly recharging finally. She feels more hopeful.  \"I feel empowered to get through this difficult time.\"     *she reported that she still has things she would like to process about her relationship with her  in regards to intimacy - two parts: Can our marriage survive without intimacy? Can I be happy if he is unable to be intimate?     Therapist impression of patients current state:   The therapist prompted patient to express thoughts and feelings following a CBT framework. Therapist provided unconditional positive regard and support, normalizing and validating her experiences. Therapist and patient reviewed contributing factors to her increased hopefulness.     Type of psychotherapeutic technique provided: Client centered and CBT    Progress toward short term goals:Progress as expected, with patient reflecting upon the impact of life events while building trust in the therapeutic process. Patient appeared very open to following therapist recommendations. She is working on therapy skills outside of session.     Review of long term goals:   Not done at today's visit and Date of last review 7/13/2020     **process sacrifices in relationship during upcoming visits    *long-term goal is to return to work     Clinical Global Impressions  Most recent:  Considering your total clinical experience " "with this particular patient population, how severe are the patient's symptoms at this time?: 4 - Moderately ill  Compared to the patient's condition at the START of treatment, this patient's condition is:: 3 - Minimally improved      Diagnosis:   1. Adjustment disorder with mixed anxiety and depressed mood    improving   R/O Generalized Anxiety Disorder   R/O Major Depressive Disorder   R/O PTSD    Plan and Follow-up:   Patient may need to adjust her weekly schedule due to  times for her daughter's school.   She would prefer a morning time on Fridays - perhaps 10am.     HW: practice radical self-acceptance  *HW look up modified yoga for pain and/or chair yoga - start exercising again   *HW also to continue practicing deep breathing techniques  *HW practice positive self-talk - (demonstrate same levels of compassion for self as she does for others)  *HW practice thought re-framing   Ie: \"Just get over it\" to \"it's okay to take a break\"  Ie: \"I can't deal with this\" to \"I'm doing the best that I can\"   Ie: \"my best is not good enough\" to \"I always try my best and I trust that my best is good enough\"     *plan to practice deep breathing strategies at the end of scheduled visit for consistent practice and guidance        Discharge Criteria/Planning: Patient will continue with follow-up until therapy can be discontinued without return of signs and symptoms.    I have reviewed the note as documented above.  This accurately captures the substance of my conversation with the patient.    As the provider I attest to compliance with applicable laws and regulations related to telemedicine.  Performed and documented by KI Josue 9/21/2020  "

## 2021-06-12 NOTE — PROGRESS NOTES
Outpatient Mental Health Treatment Plan    Name:  Anahi Stockton  :  1980  MRN:  [id number]    Treatment Plan:  Updated Treatment Plan  Intake/initial treatment plan date:  DA completed on 20  Benefit and risks and alternatives have been discussed: Yes  Is this treatment appropriate with minimal intrusion/restrictions: Yes  Estimated duration of treatment:  Approximately 10 sessions.  Anticipated frequency of services:  Every 1-2 weeks  Necessity for frequency: This frequency is needed to establish therapeutic goals and for continuity of care in order to monitor progress.  Necessity for treatment: To address cognitive, behavioral, and/or emotional barriers in order to work toward goals and to improve quality of life.    Session Type: Patient is presenting for an Individual session. via video conference using [employer] application    Plan:           ?   ? Anxiety    Goal:  Decrease average anxiety level from 3 to 2.   Strategies: ? [x]Learn and practice relaxation techniques and other coping strategies (e.g., thought stopping, reframing, meditation)     ? [x] Increase involvement in meaningful activities     ? [x] Discuss sleep hygiene     ? [x] Explore thoughts and expectations about self and others     ? [x] Identify and monitor triggers for panic/anxiety symptoms     ? [x] Implement physical activity routine (with physician approval)     ? [x] Consider introduction of bibliotherapy and/or videos     ? [x] Continue compliance with medical treatment plan (or explore barriers)   ?Degree to which this is a problem: 3.5  Degree to which goal is met: 2  Date of Review: 2021         ? Depression    Goal:  Decrease average depression level from 2 to 1.   Strategies:    ?[x] Decrease social isolation     [x] Increase involvement in meaningful activities     ?[x] Discuss sleep hygiene     ?[x] Explore thoughts and expectations about self and others     ?[x] Process grief (loss of significant person,  independence, role, etc.)     ?[x] Assess for suicide risk     ?[x] Implement physical activity routine (with physician approval)     [x] Consider introduction of bibliotherapy and/or videos     [x] Continue compliance with medical treatment plan (or explore barriers) ?  Degree to which this is a problem: 2  Degree to which goal is met: 2  Date of Review: January 2021           Functional Impairment:  1=Not at all/Rarely  2=Some days  3=Most Days  4=Every Day    Personal : 4  Family : 3  Social : 3   Work/school : 4    Diagnosis:  (EXAMPLE of DSM V: Major depressive disorder, recurrent, moderate; Generalized Anxiety disorder; borderline personality per patient PHI; fibromyalgia, History of breast cancer in remission; Problem with primary relationship.)   Adjustment Disorder with mixed anxiety and depressed mood  R/O generalized anxiety disorder  R/O Major depressive disorder    Clinical assessments and measures completed:  MING-7 = 21 (7/2/2020)  PHQ=9 = 23 (7/2/2020)  C-SSRS = low risk    MING-7 = 16 (10/30/2020)  PHQ-9 = 12 (10/30/2020)     Strengths:  intelligent, articulate, insightful, motivated   Limitations:  lack of mental health experience  Cultural Considerations: Patient is a 40 year old [place] woman. She immigrated to the [place] at age 22.     Persons responsible for this plan: Patient and Provider  Pt unable to sign due to virtual visit          Psychotherapist Signature           Patient Signature:              Guardian Signature             Provider: Performed and documented by KI Josue   Date:  10/30/2020

## 2021-06-12 NOTE — PATIENT INSTRUCTIONS - HE
"1. START taking sertraline 75mg for one week and then increase to 100mg daily  2. START taking gabapentin 600mg three times a day   3. Continue other medications as prescribed  4. Have your pharmacy contact us for a refill if you are running low on medications (We may ask you to come into clinic to get a refill from the nurse)  5. No alcohol or drug use  6. No driving if sedated  7. Contact the clinic with any questions or concerns   a. Phone: [phone]  b. Fax: [phone]  8. Reach out for help if you feel like hurting yourself or others:   a. [facility]: [address] [place] [address], [address] (phone: [phone])  b. [facility] Suicide Hotline: 600.277.9874   c. Crisis Texting Line: Text \"[place]\" to 075156  d. Call 911 or go to nearest Emergency room   9. Follow up as directed in 5-6 weeks by video for your appointment    "

## 2021-06-12 NOTE — PROGRESS NOTES
"Telemedicine Visit: The patient's condition can be safely assessed and treated via synchronous audio and visual telemedicine encounter.      Reason for Telemedicine Visit: Patient unable to travel    Originating Site (Patient Location): Patient's home    Distant Site (Provider Location): [facility] Outpatient Setting: [facility]    Consent:  The patient/guardian has verbally consented to: the potential risks and benefits of telemedicine (video visit) versus in person care; bill my insurance or make self-payment for services provided; and responsibility for payment of non-covered services.     Mode of Communication:  Video Conference via [employer]    As the provider I attest to compliance with applicable laws and regulations related to telemedicine.    Outpatient Psychiatric Follow Up    Date of Service: 11/6/2020    --  Chief Complaint: \"I was feeling better for a while but the last two weeks have been really difficult\"     --  History of Present Illness/Client Impression of Mental Health Consult:    Anahi Stockton is a 40 y.o. female who presents for virtual visit follow up appointment. Last visit occurred on 9/30/20. At that time titrated gabapentin.     Feeling more anxiety due to upcoming return to work, risk of family members getting sick, and children in school. Believes depression has continued improving. Anxiety continues to be problematic. Was unable to fill gabapentin prescription at pharmacy due to insurance. Encouraged to reach out if future difficulties. Has upcoming in person appointment with rheumatologist.  Requesting return to work letter. Discussed writer's and therapist's recommendations.     States mood is \"anxious\". Rates depression minimal and anxiety moderate. No new sleep or energy maintenance concerns. No appetite or weight concerns. No suicidal and homicidal ideation. No overt psychosis. Denies all other psychiatric symptoms. Ongoing chronic pain. No new " physical concerns.     Medication adherence: Reviewed risk/benefits of medication , Patient able to verbalize understanding of side effects  and Patient verbally consents to taking medications  Medication side effects: none  The patient was given information on medications: currently prescribed    --  [place] Prescription Monitoring Program  No worrisome pharmacy activity.     --  Clinical Outcomes Measures:  PHQ-9 Total Score: 20  MING-7: 20    --  Current Medications:  Current Outpatient Medications   Medication Sig Dispense Refill     acetaminophen (TYLENOL) 500 MG tablet Take 500 mg by mouth every 6 (six) hours as needed for pain.       cholecalciferol, vitamin D3, 1,000 unit (25 mcg) tablet Take 2,000 Units by mouth daily.        cyclobenzaprine (FLEXERIL) 10 MG tablet Take 1 tab p.o. qhs (if feeling groggy the following morning, can try taking half a tablet instead or can take earlier the night before). 30 tablet 1     gabapentin (NEURONTIN) 300 MG capsule Take 2 capsules (600 mg total) by mouth 3 (three) times a day. 180 capsule 2     ibuprofen (ADVIL,MOTRIN) 200 MG tablet Take 400 mg by mouth every 6 (six) hours as needed for pain.       multivitamin therapeutic tablet Take 1 tablet by mouth daily.       propranoloL (INDERAL) 10 MG tablet Take 1 tablet (10 mg total) by mouth 3 (three) times a day as needed (anxiety). 90 tablet 0     sertraline (ZOLOFT) 50 MG tablet Take 1 tablet (50 mg total) by mouth daily. 30 tablet 2     No current facility-administered medications for this visit.        --  Allergies  No Known Allergies  --  Summary of Diagnostic Studies  No visits with results within 30 Day(s) from this visit.   Latest known visit with results is:   Lab Requisition on 02/07/2020   Component Date Value Ref Range Status     MMA Serum/Plasma, Vitamin B12 Stat* 02/07/2020 0.13  0.00 - 0.40 umol/L Final     ACH Receptor (Muscle) Modulating A* 02/07/2020 0  % Final     ACH Receptor (Muscle) Binding Anti*  02/07/2020 0.00  <=0.02 nmol/L Final     Striational (Striated Muscle) Anti* 02/07/2020 Negative  <1:120 titer Final     MG Interpretive Comments 02/07/2020 SEE BELOW   Final     CK, Total 02/07/2020 116  30 - 190 U/L Final     Ferritin 02/07/2020 17  10 - 130 ng/mL Final     Magnesium 02/07/2020 1.9  1.8 - 2.6 mg/dL Final     Albumin % 02/07/2020 63.6  51.0 - 67.0 % Final     Albumin  02/07/2020 4.2  3.2 - 4.7 g/dL Final     Alpha 1 % 02/07/2020 2.7  2.0 - 4.0 % Final     Alpha 1 02/07/2020 0.2  0.1 - 0.3 g/dL Final     Alpha 2 % 02/07/2020 10.2  5.0 - 13.0 % Final     Alpha 2 02/07/2020 0.7  0.4 - 0.9 g/dL Final     % Beta 02/07/2020 11.6  10.0 - 17.0 % Final     Beta 02/07/2020 0.8  0.7 - 1.2 g/dL Final     Gamma Globulin % 02/07/2020 11.9  9.0 - 20.0 % Final     Gamma Globulin 02/07/2020 0.8  0.6 - 1.4 g/dL Final     ELP Comment 02/07/2020 Unremarkable protein electrophoresis.     Final     Protein, Total 02/07/2020 6.6  6.0 - 8.0 g/dL Final     Path ICD: 02/07/2020 R20.0   Final     Interpreted By: 02/07/2020 Yusef Solorzano MD   Final     Sed Rate 02/07/2020 6  0 - 20 mm/hr Final     MuSK Autoantibody, S 02/07/2020 0.00  0.00 - 0.02 nmol/L Final       --  Review of Systems  Wt Readings from Last 3 Encounters:   08/07/20 142 lb (64.4 kg)   01/09/20 136 lb (61.7 kg)   12/27/19 136 lb (61.7 kg)     Temp Readings from Last 3 Encounters:   12/17/19 98  F (36.7  C) (Oral)   12/17/19 98.4  F (36.9  C) (Oral)     BP Readings from Last 3 Encounters:   01/09/20 112/68   12/27/19 110/74   12/20/19 120/80     Pulse Readings from Last 3 Encounters:   01/09/20 72   12/27/19 74   12/20/19 78      LMP 10/23/2020  unable to assess today Pain Score: moderate  Pain Location: generalized     As noted in the subjective section above, otherwise a 10 point review of systems is negative. Limited ability to assess given virtual nature of visit. Review of symptoms based entirely on patient's verbal report and what writer is able  "to assess via camera if used during appointment.    --  Mental Status Examination    Appearance: appears stated age, clean, well groomed, casually dressed appropriate for weather   Orientation: Patient alert and oriented to person, place, time, and situation  Reliability:  Patient appears to be an adequate historian.   Behavior: Patient makes good eye contact and engages with normal rapport in the interview.   There is no evidence of responding to hallucinations or flashbacks.  Speech: Speech is spontaneous and coherent, with a normal rate, rhythm, and tone.    Language: There are no difficulties with expressive or receptive language as observed throughout the interview.    Mood: Described as \"anxious\".    Affect: Congruent and shows a normal range and level of reactivity.  Judgement: Able to make basic decision regarding safety.  Insight: Good awareness of physical and mental health conditions and aware of needs around care for these.  Gait and station: unable to assess   Thought process: Logical   Thought content: No evidence of delusions or paranoia.  No thoughts of self-harm or suicide. No thoughts of harming others.  Associations: Connected  Fund of knowledge: Average  Attention / Concentration: Able to remain focused during the interview with minimal distractibility or need for redirection.  Short Term Memory: Grossly intact as evidence by client recalling themes and ideas discussed.  Long Term Memory: Intact  Motor Status: No recent apparent change.  No current tremor.    --  Diagnostic Impression:  1. Adjustment disorder with mixed anxiety and depressed mood  - gabapentin (NEURONTIN) 600 MG tablet; Take 1 tablet (600 mg total) by mouth 3 (three) times a day.  Dispense: 270 tablet; Refill: 0  - propranoloL (INDERAL) 10 MG tablet; Take 1 tablet (10 mg total) by mouth 3 (three) times a day as needed (anxiety).  Dispense: 180 tablet; Refill: 0  - sertraline (ZOLOFT) 100 MG tablet; Take 1 tablet (100 mg total) by " mouth daily.  Dispense: 30 tablet; Refill: 0    2. Current moderate episode of major depressive disorder without prior episode (H)  - sertraline (ZOLOFT) 100 MG tablet; Take 1 tablet (100 mg total) by mouth daily.  Dispense: 30 tablet; Refill: 0    3. Other chronic pain    --  Medical Decision-Making   Anahi Stockton is a 40 y.o. female who presents for ongoing outpatient psychiatric care. Referred to this writer by PCP for evaluation of mental health. Established with therapist, Carolyne Figueredo, every other week for individual therapy. Medically complex: has Allergic rhinitis; Depressive disorder; Anxiety; and Other chronic pain on their problem list. Past medication trials include: Cymbalta and citalopram.     Subjective improvements to depressive symptoms and worsening anxiety. No significant objective scoring changes. Titrate sertraline to target symptoms. Initiate increase gabapentin for anxiety and pain. Encouraged to use propranolol PRN. Denies any side effects. No other medication changes indicated today. Work, finances, personal health, and family health conditions ongoing stressors. Reviewed and in agreement with therapist recommendations for returning to work. Created note reflecting work plan and sent to patient through [employer]. Reviewed recent lab work. No other lab work indicated today.     Patient denies suicidal and homicidal ideation. Not at imminent risk this visit. Educated on need to seek emergent services should they become a risk to themselves or others. Anahi Stockton verbalized understanding and agreement with this safety plan.       --  Plan  1. Continue to monitor for safety  2. Current Medications  1. TITRATE sertraline 50mg to 100mg daily for depression and anxiety  2. Continue propranolol 10mg three times a day PRN for anxiety. Patient educated to hold medication if symptomatic for hypotension  3. TITRATE gabapentin 300 mg to 600mg 3 times daily for anxiety and chronic pain  4. REFILLS: see  above  1. Labs ordered this visit: n/a  2. Complete neuropsychiatric testing to evaluate for other contributing factors for current symptoms  3. Continue individual psychotherapy appointments for mood stabilization and nonpharmacologic coping. Collaborate with interdisciplinary care team as needed.  3. Patient will continue abstinence from drugs and alcohol  4. Patient to return to clinic in 5-6 weeks for evaluation of medication trials and continued assessment. Ongoing patient psychoeducation regarding chronic illness and treatment.   1. Patient educated that they may schedule sooner appointment or contact writer for any worsening or lack of improvement in symptoms.   5. I reviewed the potential risks, side effects, and benefits of all medications with the patient. Patient verbalized understanding and was encouraged to call clinic with further questions or concerns.    START TIME: 11:30 AM  END TIME: 12:00 PM    Appointment duration: 30 minutes with > 75% spent on coordination of care and psycho-education regarding illness, symptoms, neurobiological basis of disease, alternative interventions, sleep hygiene, safety planning, return to work planning, care planning, and pharmacology.    Dr. Mónica Bills, DNP, APRN, PMHNP-BC  Nurse Practitioner - Psychiatry    This medical report was made using Dragon Dictation. Spelling and grammatical errors with Dragon exist and are not intentional.

## 2021-06-12 NOTE — PROGRESS NOTES
________________________________________  Medications Phoned  to Pharmacy [] yes [x]no  Name of Pharmacist:  List Medications, including dose, quantity and instructions    Medications ordered this visit were e-scribed.  Verified by order class [x] yes  [] no  Gabapentin, Inderal, and Zoloft  Medication changes or discontinuations were communicated to patient's pharmacy: [] yes  [x] no    Dictation completed at time of chart check: [x] yes  [] no    I have checked the documentation for today s encounters and the above information has been reviewed and completed.

## 2021-06-12 NOTE — TELEPHONE ENCOUNTER
Called the pharmacist to check on the Gabapentin and it did go through. The patient already picked it up.   We never received an approval through the insurance company.

## 2021-06-12 NOTE — TELEPHONE ENCOUNTER
Orders being requested: Vitamin D and COVID antibody  Reason service is needed/diagnosis: Low vitamin levels. Patient stated she was dealing with neurological issues and it has been almost a year and she is still having the same symptoms and was referred to neurology and found nothing. Patient stated that Angela Dewitt MD had ordered a COVID antibody test but patient didn't get the chance to get it done and would like to have it done. Patient stated the schedulers was unable to schedule her for antibody test and was advised to send a message to Angela Dewitt MD to reorder test.  When are orders needed by: as soon as possible  Where to send Orders: [facility]  Okay to leave detailed message?  Yes  [phone]

## 2021-06-12 NOTE — PROGRESS NOTES
Mental Health Visit Note    Patient: Anahi Stockton    : 1980 MRN: [id number]    10/30/2020    Start time: 10:00am    Stop Time: 10:50am   Session # 15    Session Type: Patient is presenting for an Individual session.    Anahi Stockton is a 40 y.o. female is being seen today for    Chief Complaint   Patient presents with     MH Follow Up     Psychotherapy follow-up visit for anxiety and depression   .     Telemedicine Visit: The patient's condition can be safely assessed and treated via synchronous audio and visual telemedicine encounter.      Reason for Telemedicine Visit: Services only offered telehealth    Originating Site (Patient Location): Patient's home    Distant Site (Provider Location): Provider Remote Setting    Consent:  The patient/guardian has verbally consented to: the potential risks and benefits of telemedicine (video visit) versus in person care; bill my insurance or make self-payment for services provided; and responsibility for payment of non-covered services.     Mode of Communication:  Video Conference via [employer]     As the provider I attest to compliance with applicable laws and regulations related to telemedicine.    Those present for this visit include patient and therapist    Follow up in regards to ongoing symptom management of anxiety and depression    New symptoms or complaints: None    Functional Impairment:   Personal: 4  Family: 3  Social: 2  Work: 4    Clinical assessment of mental status:   Anahi Stockton presented on time.   She was oriented x3, open and cooperative, and dressed appropriately for this session and weather. Her memory was Normal cognitive functioning .  Her speech was  Within normal.  Language was congruent with speech.  Concentration and focus is Within normal. Psychosis is not noted or reported. She reports her mood is Anxious and Depressed.  Affect is congruent with speech and is Congruent w/content of speech.  Fund of knowledge is adequate. Insight is  adequate for therapy.    Suicidal/Homicidal Ideation present: Patient denies suicidal and homicidal ideations/means or plans.     Patient's impression of their current status:   Patient reports feeling anxious nearly everyday. She reports trouble relaxing. She reports the feeling that something awful will happen. She reports the anxiety symptoms interfere with daily activities and make things very difficult for her. She primarily worries about the pandemic and about returning to work. However, she feels better knowing that she can gradually return to work at her own pace.    She reports feeling more anxious than depressed lately. However, she does identify different areas of progress and feeling more hopeful about the future.     Therapist impression of patients current state:   The therapist prompted patient to express thoughts and feelings following a CBT framework. Therapist affirmed areas of progress including patient's efforts to use positive self-talk, deep breathing techniques and establish a daily routine while prioritizing self-care.     Type of psychotherapeutic technique provided: Client centered and CBT    Progress toward short term goals:Progress as expected, with patient adequately engaging in the therapeutic process.. Patient appeared very open to following therapist recommendations and was receptive to feedback. She is working on therapy skills outside of session and continues to appear vulnerable while in session (talking about topics she has never discussed with anyone before).   She reports that since starting therapy, the motivation and energy to do the hard things has changed - she is feeling ready to try to face the challenges.  She reports that therapy has been very helpful to her.     Review of long term goals:   Treatment Plan updated   MING-7 = 16  PHQ-9 = 12    **process sacrifices in relationship (from a grief and loss perspective) during upcoming visits    *long-term goal is to return to  "work by December 2020    Clinical Global Impressions  Most recent:  Considering your total clinical experience with this particular patient population, how severe are the patient's symptoms at this time?: 4 - Moderately ill  Compared to the patient's condition at the START of treatment, this patient's condition is:: 3 - Minimally improved      Diagnosis:   1. Adjustment disorder with mixed anxiety and depressed mood    improving   R/O Generalized Anxiety Disorder   R/O Major Depressive Disorder   R/O PTSD    Plan and Follow-up:   Will plan to return to work by December 7 with reduced hours (2 days/week at 4hrs/day) - therapist will write note via [employer] during next visit - will plan to review recommended hours every 6 weeks -  Will maintain therapy visits every 2-3 weeks while patient also follows up with her psychiatrist     HW: practice radical self-acceptance  *HW look up modified yoga for pain and/or chair yoga - start exercising again   *HW also to continue practicing deep breathing techniques  *HW practice positive self-talk - (demonstrate same levels of compassion for self as she does for others)  *HW practice thought re-framing   Ie: \"Just get over it\" to \"it's okay to take a break\"  Ie: \"I can't deal with this\" to \"I'm doing the best that I can\"   Ie: \"my best is not good enough\" to \"I always try my best and I trust that my best is good enough\"         Discharge Criteria/Planning: Patient will continue with follow-up until therapy can be discontinued without return of signs and symptoms.    I have reviewed the note as documented above.  This accurately captures the substance of my conversation with the patient.    As the provider I attest to compliance with applicable laws and regulations related to telemedicine.  Performed and documented by KI Josue 10/30/2020  "

## 2021-06-12 NOTE — TELEPHONE ENCOUNTER
Spoke with the patient and relayed message below from Dr. Dewitt.  Patient verbalized understanding and had no further questions at this time.  Jocelyn CRANE CMA/RENA....................11:00 AM

## 2021-06-12 NOTE — PROGRESS NOTES
Mental Health Visit Note    Patient: Anahi Stockton    : 1980 MRN: [id number]    10/9/2020    Start time: 10:00am    Stop Time: 10:50am   Session # 13    Session Type: Patient is presenting for an Individual session.    Anahi Stockton is a 40 y.o. female is being seen today for    Chief Complaint   Patient presents with     MH Follow Up     Psychotherapy follow-up visit for anxiety and depression   .     Telemedicine Visit: The patient's condition can be safely assessed and treated via synchronous audio and visual telemedicine encounter.      Reason for Telemedicine Visit: Services only offered telehealth    Originating Site (Patient Location): Patient's home    Distant Site (Provider Location): Provider Remote Setting    Consent:  The patient/guardian has verbally consented to: the potential risks and benefits of telemedicine (video visit) versus in person care; bill my insurance or make self-payment for services provided; and responsibility for payment of non-covered services.     Mode of Communication:  Video Conference via [employer]     As the provider I attest to compliance with applicable laws and regulations related to telemedicine.    Those present for this visit include patient and therapist    Follow up in regards to ongoing symptom management of anxiety and depression    New symptoms or complaints: None    Functional Impairment:   Personal: 4  Family: 3  Social: 2  Work: 4    Clinical assessment of mental status:   Anahi Stockton presented on time.   She was oriented x3, open and cooperative, and dressed appropriately for this session and weather. Her memory was Normal cognitive functioning .  Her speech was  Within normal.  Language was congruent with speech.  Concentration and focus is Within normal. Psychosis is not noted or reported. She reports her mood is Anxious and Depressed.  Affect is congruent with speech and is Congruent w/content of speech.  Fund of knowledge is adequate. Insight is  adequate for therapy.    Suicidal/Homicidal Ideation present: Patient denies suicidal and homicidal ideations/means or plans.     Patient's impression of their current status:   Patient reports that things have been up and down since her last visit. She continues to grimes the stress of COVID19. She continues to doubt her decisions. She describes the physical signs related to stress (shaking, nausea, numbness, tension in neck, headache). She has discovered that her body pain gets worse when she is more stressed - this has been a sense of relief knowing the nature of the pain.    She shares fears about returning to work. There are a lot of unknowns about what to expect upon her return. She anticipates a return date of December.   She requests to process more about the status of her marriage.     Therapist impression of patients current state:   The therapist prompted patient to express thoughts and feelings following a CBT framework. Therapist provided unconditional positive regard and support, normalizing and validating her experiences. Therapist reviewed the importance of mind-body exercises to help her cope with stress. Therapist and patient discussed opportunities to rebuild self-esteem. Therapist encouraged patient to work on asserting her needs and expressing her emotions as often as possible.     Type of psychotherapeutic technique provided: Client centered and CBT    Progress toward short term goals:Progress as expected, with patient adequately engaging in the therapeutic process.. Patient appeared very open to following therapist recommendations and was receptive to feedback. She is working on therapy skills outside of session and continues to appear vulnerable while in session (talking about topics she has never discussed with anyone before).     Review of long term goals:   Not done at today's visit and Date of last review 7/13/2020   *will update by end of October     **process sacrifices in relationship  "(from a grief and loss perspective) during upcoming visits    *long-term goal is to return to work     Clinical Global Impressions  Most recent:  Considering your total clinical experience with this particular patient population, how severe are the patient's symptoms at this time?: 4 - Moderately ill  Compared to the patient's condition at the START of treatment, this patient's condition is:: 3 - Minimally improved      Diagnosis:   1. Adjustment disorder with mixed anxiety and depressed mood    improving   R/O Generalized Anxiety Disorder   R/O Major Depressive Disorder   R/O PTSD    Plan and Follow-up:   Continue to build physical strength through exercise      HW: practice radical self-acceptance  *HW look up modified yoga for pain and/or chair yoga - start exercising again   *HW also to continue practicing deep breathing techniques  *HW practice positive self-talk - (demonstrate same levels of compassion for self as she does for others)  *HW practice thought re-framing   Ie: \"Just get over it\" to \"it's okay to take a break\"  Ie: \"I can't deal with this\" to \"I'm doing the best that I can\"   Ie: \"my best is not good enough\" to \"I always try my best and I trust that my best is good enough\"     *plan to practice deep breathing strategies at the end of scheduled visit for consistent practice and guidance        Discharge Criteria/Planning: Patient will continue with follow-up until therapy can be discontinued without return of signs and symptoms.    I have reviewed the note as documented above.  This accurately captures the substance of my conversation with the patient.    As the provider I attest to compliance with applicable laws and regulations related to telemedicine.  Performed and documented by KI Josue 10/9/2020  "

## 2021-06-12 NOTE — TELEPHONE ENCOUNTER
Please contact her.  Dr. Hawley and Dr. Banegas ordered extensive lab work to evaluate her symptoms in May, June and September.  I recommend she proceed with all of the labs that are in the system ordered as future since May.  Vitamin D is included, and a COVID antibody is in there too.  Let me know if questions.  Thanks.

## 2021-06-12 NOTE — TELEPHONE ENCOUNTER
KRISTEN Wooten test is already ordered. She can have that drawn. Do you also advise on a vitamin D? Any other labs you recommend? Looks like she had a lot of future labs ordered in her chart that have never been drawn.    Cady SAVAGE LPN .......... 4:13 PM  11/02/20  [facility]

## 2021-06-13 NOTE — TELEPHONE ENCOUNTER
Date of Last Office Visit: 11/06/2020  Date of Next Office Visit: 12/29/2020  No shows since last visit: none  Cancellations since last visit: none  ED visits since last visit:  none    Medication Sertraline 100 mg date last ordered: 11/6/2020  Qty: 30  Refills: 0    Lapse in therapy greater than 7 days:no  Medication refill request verified as identical to current order: yes  Result of Last DAM, VPA, Li+ Level, CBC, or Carbamazepine Level (at or since last visit): n/a     [] Medication refilled per [facility] M-1.   [x] Medication unable to be refilled by RN due to criteria not met as indicated below:     []Eligibility - not seen in last year    []Supervision - no future appointment    []Compliance     []Verification - order discrepancy    []Controlled Medication    []Medication not included in RN Protocol    []90 - day supply request    [x]Other  Patient has appt 12/29/2020  Current Medication list: Anahi Stockton   (MRN [id number])  Your Current Medications Are    acetaminophen (TYLENOL) 500 MG tablet Take 500 mg by mouth every 6 (six) hours as needed for pain.   cholecalciferol, vitamin D3, 1,000 unit (25 mcg) tablet Take 2,000 Units by mouth daily.    cyclobenzaprine (FLEXERIL) 10 MG tablet Take 1 tab p.o. qhs (if feeling groggy the following morning, can try taking half a tablet instead or can take earlier the night before).   gabapentin (NEURONTIN) 600 MG tablet Take 1 tablet (600 mg total) by mouth 3 (three) times a day.   ibuprofen (ADVIL,MOTRIN) 200 MG tablet Take 400 mg by mouth every 6 (six) hours as needed for pain.   multivitamin therapeutic tablet Take 1 tablet by mouth daily.   propranoloL (INDERAL) 10 MG tablet Take 1 tablet (10 mg total) by mouth 3 (three) times a day as needed (anxiety).   sertraline (ZOLOFT) 100 MG tablet Take 1 tablet (100 mg total) by mouth daily.       Medication Plan of Care at last office visit with MD/CNP:  Plan  1. Continue to monitor for safety  2. Current Medications  1. TITRATE  sertraline 50mg to 100mg daily for depression and anxiety  2. Continue propranolol 10mg three times a day PRN for anxiety. Patient educated to hold medication if symptomatic for hypotension  3. TITRATE gabapentin 300 mg to 600mg 3 times daily for anxiety and chronic pain  4. REFILLS: see above  1. Labs ordered this visit: n/a  2. Complete neuropsychiatric testing to evaluate for other contributing factors for current symptoms  3. Continue individual psychotherapy appointments for mood stabilization and nonpharmacologic coping. Collaborate with interdisciplinary care team as needed.  3. Patient will continue abstinence from drugs and alcohol  4. Patient to return to clinic in 5-6 weeks for evaluation of medication trials and continued assessment. Ongoing patient psychoeducation regarding chronic illness and treatment.   1. Patient educated that they may schedule sooner appointment or contact writer for any worsening or lack of improvement in symptoms.   I reviewed the potential risks, side effects, and benefits of all medications with the patient. Patient verbalized understanding and was encouraged to call clinic with further questions or concerns

## 2021-06-13 NOTE — PROGRESS NOTES
Mental Health Visit Note    Patient: Anahi Stockton    : 1980 MRN: [id number]    2020    Start time: 10:00am    Stop Time: 10:50am   Session # 16    Session Type: Patient is presenting for an Individual session.    Anahi Stockton is a 40 y.o. female is being seen today for    Chief Complaint   Patient presents with     MH Follow Up     Psychotherapy follow-up visit for anxiety and depression   .     Telemedicine Visit: The patient's condition can be safely assessed and treated via synchronous audio and visual telemedicine encounter.      Reason for Telemedicine Visit: Services only offered telehealth    Originating Site (Patient Location): Patient's home    Distant Site (Provider Location): Provider Remote Setting    Consent:  The patient/guardian has verbally consented to: the potential risks and benefits of telemedicine (video visit) versus in person care; bill my insurance or make self-payment for services provided; and responsibility for payment of non-covered services.     Mode of Communication:  Video Conference via [employer]     As the provider I attest to compliance with applicable laws and regulations related to telemedicine.    Those present for this visit include patient and therapist    Follow up in regards to ongoing symptom management of anxiety and depression    New symptoms or complaints: None    Functional Impairment:   Personal: 4  Family: 3  Social: 2  Work: 4    Clinical assessment of mental status:   Anahi Stockton presented on time.   She was oriented x3, open and cooperative, and dressed appropriately for this session and weather. Her memory was Normal cognitive functioning .  Her speech was  Within normal.  Language was congruent with speech.  Concentration and focus is Within normal. Psychosis is not noted or reported. She reports her mood is Anxious and Depressed.  Affect is congruent with speech and is Congruent w/content of speech.  Fund of knowledge is adequate. Insight is  "adequate for therapy.    Suicidal/Homicidal Ideation present: Patient denies suicidal and homicidal ideations/means or plans.     Patient's impression of their current status:   Patient reports that it has been stressful these past few weeks. She reports that the biggest problem continues to be chronic fatigue. She also struggles with guilt when physically unable to be as active as she has been in the past. She was diagnosed with fibromyalgia and understands the nature of her condition. She feels very anxious about the pandemic and about returning to work. She reports \"just trying to get through this.\"     Therapist impression of patients current state:   The patient has good insight into the nature and symptoms of her symptoms. She easily engages in dialogue and is receptive to therapist feedback. She understands her treatment plan for different medical issues.      Type of psychotherapeutic technique provided: Client centered and CBT    Progress toward short term goals:Progress as expected, with patient adequately engaging in the therapeutic process..  Good progress in regards to shifting unhelpful beliefs:  \"there is a legitimate medical reason for how I feel, it is not my fault and I didn't do anything wrong and the more I move, the better I'll feel.\"   Good progress in practicing stress management techniques.     Review of long term goals:   Not done at today's visit   Treatment plan updated on 10/30/20       Clinical Global Impressions  Most recent:  Considering your total clinical experience with this particular patient population, how severe are the patient's symptoms at this time?: 4 - Moderately ill  Compared to the patient's condition at the START of treatment, this patient's condition is:: 5 - Minimally worse      Diagnosis:   1. Adjustment disorder with mixed anxiety and depressed mood    improving   R/O Generalized Anxiety Disorder   R/O Major Depressive Disorder   R/O PTSD    Plan and Follow-up: "   Plan to follow-up with therapist again in 3 weeks    *HW look up modified yoga for pain and/or chair yoga - start exercising again   *HW also to continue practicing deep breathing techniques  *HW practice positive self-talk - (demonstrate same levels of compassion for self as she does for others)  *HW practice thought re-framing       Discharge Criteria/Planning: Patient will continue with follow-up until therapy can be discontinued without return of signs and symptoms.    I have reviewed the note as documented above.  This accurately captures the substance of my conversation with the patient.    As the provider I attest to compliance with applicable laws and regulations related to telemedicine.  Performed and documented by KI Josue 12/4/2020

## 2021-06-13 NOTE — TELEPHONE ENCOUNTER
Given circumstances described by patient along with being asymptomatic, if desires to hold off on repeating urinalysis that is fine.  If becomes symptomatic recommend having performed.

## 2021-06-13 NOTE — TELEPHONE ENCOUNTER
----- Message from Butch Birmingham DO sent at 12/6/2020  1:14 PM CST -----  Please mention to the patient:    Some abnormalities noted on urinalysis hence, it was reflexed by the system for a urine culture, which came back showing no growth (reassuring). Recommend rechecking (midstream clean-catch) urinalysis in 1 to 2 weeks (sample should be provided when not experiencing her period).     Normal urine microalbumin level.    Vitamin D level was borderline low, suggest replenishing with 50,000 units qweek x 12 weeks (12 doses), no refills.  Hold 1000 units over-the-counter vitamin D daily dose, while taking replenishing dose, can restart daily dose once weekly replenishing dose is complete.    CBC/cell count results were stable.      Otherwise remainder of lab results were within normal limits.    Please place lab orders mentioned above.

## 2021-06-13 NOTE — PROGRESS NOTES
ASSESSMENT AND PLAN:    Anahi Stockton 40 y.o. female is seen here on 20 for follow-up of recent virtual evaluation for polymyalgia fatigue generalized numbness tingling, new onset of headaches, photophobia, sonophobia, poor quality of sleep, mental fog, with many characteristics of fibromyalgia, and today's examination confirms the absence of inflammatory joint disease enthesitis, dactylitis, recent work up is negative for signals of autoimmunity.  Today we had a detailed discussion around the known model of pathophysiology of fibromyalgia, limited neuro pharmacology, nonpharmacologic measures.  She is to follow-up with the primary physician.         Diagnoses and all orders for this visit:    Fibromyalgia    Depressive disorder      HISTORY OF PRESENTING ILLNESS:  Anahi Stockton, 40 y.o., female is here for follow-up of recent evaluation of widespread myalgias, polyarthralgias.  She has ample evidence of fibromyalgia.  She had lost her psychiatrist as we discussed previously that if nortriptyline, having tried other such as Celexa, duloxetine, currently on gabapentin might be an option.  It seems that that was not a good idea from their perspective.  With gabapentin and she has noted significant improvement in the sharpness of pain.  The fatigue however remains unabated.  She reports that it was in 2009 pain that she had which she  time thought to be a common cold/viral illness.  Typically her viruses symptoms lasts no more than few days.  This lasted almost a month.  After she recovered from this to weeks past by during which she felt well.  She works as a .  She was able to go back to work.  She felt nearly back to her usual active self, indicating of family, working out.  Typically she would have quite a bit of energy to manage these issues.    In December she started experiencing new symptoms.  The original symptoms were numbness and tingling in her toes then pain in her  muscles in the lower extremities than the upper extremities.  Then she started hurting in her hands and toes and gradually almost all joints were affected, and muscle groups.  She would have ongoing pain.  There was no diurnal variation.  She had difficulty sleeping.  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  She had work-up done at her primary physician, neurology, and rheumatology.  She has followed up with a psychiatrist and for therapy given how bad she has felt emotionally and psychiatric issues since the symptoms began.  She was given duloxetine initially at a lower dose and increase that did not help plus she was not able to tolerate.  Recently gabapentin is been started she is beginning to sleep better with that.  She reports no joints are swollen.  She has not had any fluid obtained from any of the joints.  She does not have personal or family history of psoriasis ulcerative colitis Crohn's disease.  There is no family history of rheumatoid arthritis.  And lupus.  Her  has had chemotherapy for cancer.  She had work-up done was negative for Lyme serology, no acute phase response, negative OMID, rheumatoid factor.  She describes no rash pleuritic symptoms mouth ulcers photosensitive rash seizure disorder.  She has felt depressed since the beginning of the symptoms which is in sharp contrast to where she was before it all happened.  During the work-up for headache which is also a new phenomenon for her aneurysm was found she was seen in neurosurgery and reassured her.  She describes no features suggestive of raynauds..c  ALLERGIES:Patient has no known allergies.    PAST MEDICAL/ACTIVE PROBLEMS/MEDICATION/ FAMILY HISTORY/SOCIAL DATA:  The patient has a family history of  Past Medical History:   Diagnosis Date     Aneurysm (H)      Depression      Social History     Tobacco Use    Smoking Status Never Smoker   Smokeless Tobacco Never Used     Patient Active Problem List   Diagnosis     Allergic rhinitis     Depressive disorder     Anxiety     Other chronic pain     Current Outpatient Medications   Medication Sig Dispense Refill     acetaminophen (TYLENOL) 500 MG tablet Take 500 mg by mouth every 6 (six) hours as needed for pain.       cholecalciferol, vitamin D3, 1,000 unit (25 mcg) tablet Take 2,000 Units by mouth daily.        cyclobenzaprine (FLEXERIL) 10 MG tablet Take 1 tab p.o. qhs (if feeling groggy the following morning, can try taking half a tablet instead or can take earlier the night before). 30 tablet 1     gabapentin (NEURONTIN) 600 MG tablet Take 1 tablet (600 mg total) by mouth 3 (three) times a day. 270 tablet 0     ibuprofen (ADVIL,MOTRIN) 200 MG tablet Take 400 mg by mouth every 6 (six) hours as needed for pain.       multivitamin therapeutic tablet Take 1 tablet by mouth daily.       propranoloL (INDERAL) 10 MG tablet Take 1 tablet (10 mg total) by mouth 3 (three) times a day as needed (anxiety). 180 tablet 0     sertraline (ZOLOFT) 100 MG tablet Take 1 tablet (100 mg total) by mouth daily. 30 tablet 0     No current facility-administered medications for this visit.      DETAILED EXAMINATION  12/01/20  :  Vitals:    12/01/20 1501   BP: (!) 88/60   Patient Site: Right Arm   Patient Position: Sitting   Cuff Size: Adult Regular   Pulse: 76     Alert oriented. Head including the face is examined for malar rash, heliotropes, scarring, lupus pernio. Eyes examined for redness such as in episcleritis/scleritis, periorbital lesions.   Neck/ Face examined for parotid gland swelling, range of motion of neck.  Left upper and lower and right upper and lower extremities examined for tenderness, swelling, warmth of the appendicular joints, range of motion, edema, rash.  Some of the important findings included: she does not have synovitis in any of the palpable upper or lower extremity  joints.  There is no dactylitis of digits or toes.  There is no enthesitis such as around the Achilles or quadriceps.  None at patellar.  She has tenderness across trapezius paraspinal region, she is tender in the proximal upper extremity, distal lower extremities on the calves.  She is not tender in the trochanteric or proximal lower extremities.       LAB / IMAGING DATA:  ALT   Date Value Ref Range Status   11/23/2020 14 0 - 45 U/L Final   12/20/2019 15 0 - 45 U/L Final     Albumin   Date Value Ref Range Status   11/23/2020 4.1 3.5 - 5.0 g/dL Final   12/20/2019 4.5 3.5 - 5.0 g/dL Final     Creatinine   Date Value Ref Range Status   11/23/2020 0.73 0.60 - 1.10 mg/dL Final   12/20/2019 0.74 0.60 - 1.10 mg/dL Final   12/17/2019 0.80 0.60 - 1.10 mg/dL Final       WBC   Date Value Ref Range Status   11/23/2020 5.7 4.0 - 11.0 thou/uL Final   12/20/2019 9.0 4.0 - 11.0 thou/uL Final     Hemoglobin   Date Value Ref Range Status   11/23/2020 13.3 12.0 - 16.0 g/dL Final   12/20/2019 14.3 12.0 - 16.0 g/dL Final   12/17/2019 14.2 12.0 - 16.0 g/dL Final     Platelets   Date Value Ref Range Status   11/23/2020 200 140 - 440 thou/uL Final   12/20/2019 245 140 - 440 thou/uL Final   12/17/2019 232 140 - 440 thou/uL Final       Lab Results   Component Value Date    RF <15.0 12/20/2019    SEDRATE 7 11/23/2020

## 2021-06-13 NOTE — TELEPHONE ENCOUNTER
Pt notified of lab results. Pt states she was not given a wipe prior to leaving a urine sample and she is not sure if she had her period at the time of the sample. Pt states she has no pain, frequency or urgency with urination and does not see any blood in her urine. Pt would like to hold off until March to repeat urinalysis when she has her physical due to Covid pandemic, she doesn't want to come in the clinic if not absolutely necessary.     Please advise if okay to wait to repeat UA?    Pt will take Vit D rx as prescribed. rx sent to pharmacy.

## 2021-06-13 NOTE — TELEPHONE ENCOUNTER
[facility] to relay message below.    Per Dr Birmingham  Some abnormalities noted on urinalysis hence, it was reflexed by the system for a urine culture, which came back showing no growth (reassuring). Recommend rechecking (midstream clean-catch) urinalysis in 1 to 2 weeks (sample should be provided when not experiencing her period).     Normal urine microalbumin level.     Vitamin D level was borderline low, suggest replenishing with 50,000 units qweek x 12 weeks (12 doses), no refills.  Hold 1000 units over-the-counter vitamin D daily dose, while taking replenishing dose, can restart daily dose once weekly replenishing dose is complete.     CBC/cell count results were stable.     Otherwise remainder of lab results were within normal limits.     Please place lab orders mentioned above.

## 2021-06-14 NOTE — PROGRESS NOTES
"Chief complaint: Allergy symptoms    History of present illness: This is a pleasant 37-year-old woman who is here today for allergy symptom evaluation.  She states for the last 10 years she struggled with the symptoms.  She has persistently runny nose and a lot of drainage as well as sneezing.  She states she has been on Flonase for the last 8-10 years which helps some but recently has had decreased effectiveness.  She is also started snoring recently.  Symptoms are not seasonal and occur throughout the year.  She has not used any antihistamines.  She has no history of asthma.  No cough or wheeze currently.  She does not use nasal rinses.    She is also concerned that she is allergic to eggs.  She states that the child she would vomit immediately upon eating them.  She states she is able to eat baked egg but feels very sick to her stomach if she is around eggs.  She states occasionally she would have some itching when she ate eggs as well.  For this reason she avoids them.      Past medical history: Otherwise unremarkable    Social history: She works as a , lives in a home that is 14 years old without pets, has carpeting, small amount of mold in the bathroom, central air, non-smoker    Family history: Mom with allergies    Review of Systems performed as above and the remainder is negative.      Current Outpatient Prescriptions:      fluticasone (FLONASE) 50 mcg/actuation nasal spray, , Disp: , Rfl:      beclomethasone dipropionate (QNASL) 80 mcg/actuation HFAA nasal inhaler, 2 sprays each nostril daily, Disp: 3 Inhaler, Rfl: 0    No Known Allergies    BP 98/66  Pulse 64  Ht 5' 6\" (1.676 m)  Wt 147 lb (66.7 kg)  BMI 23.73 kg/m2  Gen: Pleasant female not in acute distress  HEENT: Eyes no erythema of the bulbar or palpebral conjunctiva, no edema. Ears: TMs well visualized, no effusions. Nose: No congestion, mucosa normal. Mouth: Throat clear, no lip or tongue edema.   Cardiac: Regular rate and rhythm, no " murmurs, rubs or gallops  Respiratory: Clear to auscultation bilaterally, no adventitious breath sounds  Lymph: No supraclavicular or cervical lymphadenopathy  Skin: No rashes or lesions  Psych: Alert and oriented times 3    Last Percutaneous Allergy Test Results  Trees  Krystian, White  1:20 H  (W/F in mm): 0 (12/11/17 0950)  Birch Mix 1:20 H (W/F in mm): 0 (12/11/17 0950)  [place], Common 1:20 H (W/F in mm): 0 (12/11/17 0950)  Elm, American 1:20 H (W/F in mm): 0 (12/11/17 0950)  [place]edo, Shagbark 1:20 H (W/F in mm): 0 (12/11/17 0950)  Maple, Hard/Sugar 1:20 H (W/F in mm): 0 (12/11/17 0950)  Fries Mix 1:20 H (W/F in mm): 0 (12/11/17 0950)  Flat Rock, Red 1:20 H (W/F in mm): 0 (12/11/17 0950)  [place], American 1:20 H (W/F in mm): 0 (12/11/17 0950)  Neligh Tree 1:20 H (W/F in mm): 0 (12/11/17 0950)  Dust Mites  D. Pteronyssinus Mite 30,000 AU/ML H (W/F in mm): 0 (12/11/17 0950)  D. Farinae Mite 30,000 AU/ML H (W/F in mm: 0 (12/11/17 0950)  Grasses  Grass Mix #4 10,000 BAU/ML H: 0 (12/11/17 0950)  Lamonte Grass 1:20 H (W/F in mm): 0 (12/11/17 0950)  Cockroach  Cockroach Mix 1:10 H (W/F in mm): 0 (12/11/17 0950)  Molds/Fungi  Alternaria Tenuis 1:10 H (W/F in mm): 0 (12/11/17 0950)  Aspergillus Fumigatus 1:10 H (W/F in mm): 0 (12/11/17 0950)  Homodendrum Cladosporioides 1:10 H (W/F in mm): 0 (12/11/17 0950)  Penicillin Notatum 1:10 H (W/F in mm): 0 (12/11/17 0950)  Epicoccum 1:10 H (W/F in mm): 0 (12/11/17 0950)  Weeds  Ragweed, Short 1:20 H (W/F in mm): 0 (12/11/17 0950)  Dock, Sorrel 1:20 H (W/F in mm): 0 (12/11/17 0950)  Lamb's Quarter 1:20 H (W/F in mm): 0 (12/11/17 0950)  Pigweed, Rough Red Root 1:20 H  (W/F in mm): 0 (12/11/17 0950)  Plantain, English 1:20 H  (W/F in mm): 0 (12/11/17 0950)  Sagebrush, Mugwort 1:20 H  (W/F in mm): 0 (12/11/17 0950)  Animal  Cat 10,000 BAU/ML H (W/F in mm): 0 (12/11/17 0950)  Dog 1:10 H (W/F in mm): 0 (12/11/17 0950)  Controls  Device Type: QUINTIP (12/11/17 0950)  Neg. control: 50%  Glycerine/Saline H (W/F in mm): 0 (12/11/17 0950)  Pos. control: Histamine 6mg/ML (W/F in mms): 5/23 (12/11/17 0950)     Last Food Skin Allergy Test Results  Major Allergens  Egg (White)  1:20 (W/F in mm): 0 (12/11/17 0951)  Controls  Device Type: QUINTIP (12/11/17 0951)  Neg. Control: 50% Glycerine-Saline H (W/F in millimeters): 0 (12/11/17 0951)  Pos. Control Histamine 6 mg/ml (W/F in millimeters): 5/23 (12/11/17 0951)        Impression report and plan:  1.  Rhinitis    Allergy testing was negative.  I would suggest switching from Flonase to Qnasl to see if this can improve symptoms.  Suggested the use of nasal rinses as well.  If this does not improve symptoms, consider ENT or Astelin or Astepro nasal spray.    2.  Adverse reaction to eggs    Testing was negative.  Less likely to be a true allergy.

## 2021-06-14 NOTE — PATIENT INSTRUCTIONS - HE
"1. START taking gabapentin 800mg three times a day for anxiety and pain  2. Continue other medications as prescribed  3. Have your pharmacy contact us for a refill if you are running low on medications (We may ask you to come into clinic to get a refill from the nurse)  4. No alcohol or drug use  5. No driving if sedated  6. Contact the clinic with any questions or concerns   a. Phone: [phone]  b. Fax: [phone]  7. Reach out for help if you feel like hurting yourself or others:   a. [facility]: [address] [place] [address], [address] (phone: [phone])  b. [facility] Suicide Hotline: [phone]   c. Crisis Texting Line: Text \"[place]\" to 067047  d. Call 911 or go to nearest Emergency room   8. Follow up as directed in 1 month by video for your appointment    "

## 2021-06-14 NOTE — PROGRESS NOTES
________________________________________  Medications Phoned  to Pharmacy [] yes [x]no  Name of Pharmacist:  List Medications, including dose, quantity and instructions    Medications ordered this visit were e-scribed.  Verified by order class [x] yes  [] no  Gabapentin 800 mg  Propranolol 10 mg    Medication changes or discontinuations were communicated to patient's pharmacy: [] yes  [x] no    Dictation completed at time of chart check: [x] yes  [] no    I have checked the documentation for today s encounters and the above information has been reviewed and completed.

## 2021-06-14 NOTE — PROGRESS NOTES
Mental Health Visit Note    Patient: Anahi Stockton    : 1980 MRN: [id number]    2021    Start time: 11:00am    Stop Time: 11:50am   Session # 1    Session Type: Patient is presenting for an Individual session.    Anahi Stockton is a 40 y.o. female is being seen today for    Chief Complaint   Patient presents with     MH Follow Up     Psychotherapy follow-up visit for anxiety   .     Telemedicine Visit: The patient's condition can be safely assessed and treated via synchronous audio and visual telemedicine encounter.      Reason for Telemedicine Visit: Services only offered telehealth    Originating Site (Patient Location): Patient's home    Distant Site (Provider Location): Provider Remote Setting    Consent:  The patient/guardian has verbally consented to: the potential risks and benefits of telemedicine (video visit) versus in person care; bill my insurance or make self-payment for services provided; and responsibility for payment of non-covered services.     Mode of Communication:  Video Conference via [employer]an[employer]     As the provider I attest to compliance with applicable laws and regulations related to telemedicine.    Those present for this visit include patient and therapist    Follow up in regards to ongoing symptom management of anxiety and depression    New symptoms or complaints: None    Functional Impairment:   Personal: 4  Family: 3  Social: 2  Work: 4    Clinical assessment of mental status (mental status exam):   Anahi Stockton presented on time.   She was oriented x3, open and cooperative, and dressed appropriately for this session and weather. Her memory was Normal cognitive functioning .  Her speech was  Within normal.  Language was congruent with speech.  Concentration and focus is Within normal. Psychosis is not noted or reported. She reports her mood is Anxious and Depressed.  Affect is congruent with speech and is Congruent w/content of speech.  Fund of knowledge is adequate. Insight  "is adequate for therapy.    Suicidal/Homicidal Ideation present: Patient denies suicidal and homicidal ideations/means or plans.     Patient's impression of their current status:   Patient's impression is that she has been feeling very anxious. She identifies various stressors including work and home life affected by the pandemic. There continues to be a lot of uncertainty in her life that triggers anxiety. She continues to feel very physically exhausted - \"the fatigue is my big struggle right now.\"   She also struggles with anxious, ruminating thought patterns.   She doesn't feel as hopeless as she did several months ago.     Therapist impression of patients current state:   The patient has good insight into the connection between thoughts and feelings. She has good insight into the presence and impact of anxiety symptoms. She is receptive to therapist feedback around stress management techniques. She created a tool kit (list) today of techniques to incorporate into her daily routine.      Type of psychotherapeutic technique provided: Client centered and CBT    Progress toward short term goals:Progress as expected, with patient returning for a scheduled visit. She continues to work and try her best to apply learned coping strategies.    Review of long term goals:   Not done at today's visit   Treatment plan updated on 10/30/20       Clinical Global Impressions  Most recent:  Considering your total clinical experience with this particular patient population, how severe are the patient's symptoms at this time?: 4 - Moderately ill  Compared to the patient's condition at the START of treatment, this patient's condition is:: 3 - Minimally improved      Diagnosis:   1. Current moderate episode of major depressive disorder without prior episode (H)    2. Anxiety disorder due to medical condition    improving   R/O Generalized Anxiety Disorder   R/O PTSD    Plan and Follow-up:   Plan to follow-up with therapist in 1 " month  Patient recommends that patient consider a therapy support group for mindfulness meditation in order to address anxiety symptoms    She needs more support around managing stress so was encouraged to download the ralf 'insight timer' to help her add guided meditations into her daily routine       Discharge Criteria/Planning: Patient will continue with follow-up until therapy can be discontinued without return of signs and symptoms.    I have reviewed the note as documented above.  This accurately captures the substance of my conversation with the patient.    As the provider I attest to compliance with applicable laws and regulations related to telemedicine.  Performed and documented by KI Josue 1/18/2021

## 2021-06-14 NOTE — PROGRESS NOTES
Mental Health Visit Note    Patient: Anahi Stockton    : 1980 MRN: [id number]    2020    Start time: 9:00am    Stop Time: 9:50am   Session # 17    Session Type: Patient is presenting for an Individual session.    Anahi Stockton is a 40 y.o. female is being seen today for    Chief Complaint   Patient presents with     MH Follow Up     Psychotherapy follow-up visit for anxiety and depression   .     Telemedicine Visit: The patient's condition can be safely assessed and treated via synchronous audio and visual telemedicine encounter.      Reason for Telemedicine Visit: Services only offered telehealth    Originating Site (Patient Location): Patient's home    Distant Site (Provider Location): Provider Remote Setting    Consent:  The patient/guardian has verbally consented to: the potential risks and benefits of telemedicine (video visit) versus in person care; bill my insurance or make self-payment for services provided; and responsibility for payment of non-covered services.     Mode of Communication:  Video Conference via [employer]     As the provider I attest to compliance with applicable laws and regulations related to telemedicine.    Those present for this visit include patient and therapist    Follow up in regards to ongoing symptom management of anxiety and depression    New symptoms or complaints: None    Functional Impairment:   Personal: 4  Family: 3  Social: 2  Work: 4    Clinical assessment of mental status:   Anahi Stockton presented on time.   She was oriented x3, open and cooperative, and dressed appropriately for this session and weather. Her memory was Normal cognitive functioning .  Her speech was  Within normal.  Language was congruent with speech.  Concentration and focus is Within normal. Psychosis is not noted or reported. She reports her mood is Anxious and Depressed.  Affect is congruent with speech and is Congruent w/content of speech.  Fund of knowledge is adequate. Insight is  "adequate for therapy.    Suicidal/Homicidal Ideation present: Patient denies suicidal and homicidal ideations/means or plans.     Patient's impression of their current status:   Patient's impression is that there have been ups and downs these past few weeks. She does feel encouraged to find and schedule with a new PCP. She has been learning more about fibromyalgia. She has been able to increase her physical activity and \"knows it's what I have to keep doing.\" She denies feeling hopeless or depressed but does feel anxious. She identifies signs and symptoms of anxiety present throughout the day.   She is feeling very excited that her daughter was accepted to [employer]. She feels motivated to keep working. She would like to gradually increase her work hours.     Therapist impression of patients current state:   The patient has good insight into the connection between thoughts and feelings. She has good insight into the presence and impact of anxiety symptoms. She was receptive to therapist support around managing anxiety. Therapist also created another letter of recommendation for work hours with the intent for patient to return to her full work schedule in several months.      Type of psychotherapeutic technique provided: Client centered and CBT    Progress toward short term goals:Progress as expected, with patient meeting goal of returning to work with plans of returning to her full-time schedule in several months.     Review of long term goals:   Not done at today's visit   Treatment plan updated on 10/30/20       Clinical Global Impressions  Most recent:  Considering your total clinical experience with this particular patient population, how severe are the patient's symptoms at this time?: 4 - Moderately ill  Compared to the patient's condition at the START of treatment, this patient's condition is:: 3 - Minimally improved      Diagnosis:   1. Current moderate episode of major depressive disorder without prior " episode (H)    improving   R/O Generalized Anxiety Disorder   R/O PTSD    Plan and Follow-up:   Plan to follow-up with therapist in 1 month  Patient and therapist discussed patient's return to work plan       Discharge Criteria/Planning: Patient will continue with follow-up until therapy can be discontinued without return of signs and symptoms.    I have reviewed the note as documented above.  This accurately captures the substance of my conversation with the patient.    As the provider I attest to compliance with applicable laws and regulations related to telemedicine.  Performed and documented by KI Josue 12/23/2020

## 2021-06-14 NOTE — TELEPHONE ENCOUNTER
Pt called to relay she has a biometric form from her employer that she will be bringing on 3/8/21 for her physical with Dr. Malik.  Added to appt notes.  Thanks.

## 2021-06-14 NOTE — PROGRESS NOTES
"Telemedicine Visit: The patient's condition can be safely assessed and treated via synchronous audio and visual telemedicine encounter.      Reason for Telemedicine Visit: Patient unable to travel    Originating Site (Patient Location): Patient's home    Distant Site (Provider Location): Provider Remote Setting- Home Office    Consent:  The patient/guardian has verbally consented to: the potential risks and benefits of telemedicine (video visit) versus in person care; bill my insurance or make self-payment for services provided; and responsibility for payment of non-covered services.     Mode of Communication:  Video Conference via [employer]    As the provider I attest to compliance with applicable laws and regulations related to telemedicine.    Outpatient Psychiatric Follow Up    Date of Service: 12/29/2020    --  Chief Complaint: \"I was feeling better for a while but the last two weeks have been really difficult\"     --  History of Present Illness/Client Impression of Mental Health Consult:    Anahi Stockton is a 40 y.o. female who presents for virtual visit follow up appointment. Last visit occurred on 12/2/20. At that time titrated gabapentin and sertraline     After increasing medication had worsening nausea. During those two weeks also had higher anxiety since both her children had to be tested for COVID. Today has small amount of nausea but this has been consistently improving. Feels less hopeless and depressed. Diagnosed with fibromyalgia at begining of December and asking to increase gabapentin. Discussed neurologists recommendations for treatment. Returned to work and \"doing ok\". Discussed graduation of limited hours with writer. Discussed ongoing plan to January 15th and then increasing ongoing. Discussed fibromyalgia and mental health interactions. Currently walking on treadmill based on rheumatologists recommendations and would like to swim when facilities more open post-pandemic.    States mood is " "\"a little better\". Rates depression minimal and anxiety moderate. No new sleep or energy maintenance concerns. No appetite or weight concerns. No suicidal and homicidal ideation. No overt psychosis. Denies all other psychiatric symptoms. Ongoing chronic pain. No new physical concerns.     Medication adherence: Reviewed risk/benefits of medication , Patient able to verbalize understanding of side effects  and Patient verbally consents to taking medications  Medication side effects: nausea  The patient was given information on medications: currently prescribed    --  [place] Prescription Monitoring Program  No worrisome pharmacy activity.     --  Clinical Outcomes Measures:  PHQ-9 Total Score: 17  MING-7: 17    --  Current Medications:  Current Outpatient Medications   Medication Sig Dispense Refill     acetaminophen (TYLENOL) 500 MG tablet Take 500 mg by mouth every 6 (six) hours as needed for pain.       cholecalciferol, vitamin D3, 1,000 unit (25 mcg) tablet Take 2,000 Units by mouth daily.        cyclobenzaprine (FLEXERIL) 10 MG tablet Take 1 tab p.o. qhs (if feeling groggy the following morning, can try taking half a tablet instead or can take earlier the night before). 30 tablet 1     ergocalciferol (ERGOCALCIFEROL) 1,250 mcg (50,000 unit) capsule Take 1 capsule (50,000 Units total) by mouth every 7 days. 12 capsule 0     gabapentin (NEURONTIN) 600 MG tablet Take 1 tablet (600 mg total) by mouth 3 (three) times a day. 270 tablet 0     ibuprofen (ADVIL,MOTRIN) 200 MG tablet Take 400 mg by mouth every 6 (six) hours as needed for pain.       multivitamin therapeutic tablet Take 1 tablet by mouth daily.       propranoloL (INDERAL) 10 MG tablet Take 1 tablet (10 mg total) by mouth 3 (three) times a day as needed (anxiety). 180 tablet 0     sertraline (ZOLOFT) 100 MG tablet Take 1 tablet (100 mg total) by mouth daily. 30 tablet 2     No current facility-administered medications for this visit.  "       --  Allergies  No Known Allergies  --  Summary of Diagnostic Studies  No visits with results within 30 Day(s) from this visit.   Latest known visit with results is:   Lab on 11/23/2020   Component Date Value Ref Range Status     Hepatitis C Ab 11/23/2020 Negative  Negative Final     CCP IgG Antibodies 11/23/2020 <0.5  <=4.9 U/mL Final     CK, Total 11/23/2020 148  30 - 190 U/L Final     Creatinine 11/23/2020 0.73  0.60 - 1.10 mg/dL Final     GFR MDRD Af Amer 11/23/2020 >60  >60 mL/min/1.73m2 Final     GFR MDRD Non Af Amer 11/23/2020 >60  >60 mL/min/1.73m2 Final     ALT 11/23/2020 14  0 - 45 U/L Final     AST 11/23/2020 15  0 - 40 U/L Final     DNA (ds) Antibody 11/23/2020 2  <25 IU Final     Sed Rate 11/23/2020 7  0 - 20 mm/hr Final     CRP 11/23/2020 <0.1  0.0 - 0.8 mg/dL Final     Anti-Neutrophil Cytoplasmic Ab, IgG 11/23/2020 <1:20  <1:20 Final     Angiotensin Converting Enzyme 11/23/2020 20  9 - 67 U/L Final     B27 Test Method 11/23/2020 SBT   Final     B locus 11/23/2020 B27 Neg   Final     CCP IgG Antibodies 11/23/2020 <0.5  <=4.9 U/mL Final     Aldolase 11/23/2020 2.8  1.5 - 8.1 U/L Final     Shreya-1 Autoantibodies 11/23/2020 0  <20 EU Final     Myoglobin 11/23/2020 37  <=90 mcg/L Final     CK, Total 11/23/2020 143  30 - 190 U/L Final     Vitamin D, Total (25-Hydroxy) 11/23/2020 29.8* 30.0 - 80.0 ng/mL Final     Albumin 11/23/2020 4.1  3.5 - 5.0 g/dL Final     Calcium 11/23/2020 8.9  8.5 - 10.5 mg/dL Final     WBC 11/23/2020 5.7  4.0 - 11.0 thou/uL Final     RBC 11/23/2020 4.42  3.80 - 5.40 mill/uL Final     Hemoglobin 11/23/2020 13.3  12.0 - 16.0 g/dL Final     Hematocrit 11/23/2020 40.1  35.0 - 47.0 % Final     MCV 11/23/2020 91  80 - 100 fL Final     MCH 11/23/2020 30.2  27.0 - 34.0 pg Final     MCHC 11/23/2020 33.3  32.0 - 36.0 g/dL Final     RDW 11/23/2020 11.0  11.0 - 14.5 % Final     Platelets 11/23/2020 200  140 - 440 thou/uL Final     MPV 11/23/2020 8.8  7.0 - 10.0 fL Final     Neutrophils %  11/23/2020 49* 50 - 70 % Final     Lymphocytes % 11/23/2020 45* 20 - 40 % Final     Monocytes % 11/23/2020 5  2 - 10 % Final     Eosinophils % 11/23/2020 1  0 - 6 % Final     Basophils % 11/23/2020 0  0 - 2 % Final     Neutrophils Absolute 11/23/2020 2.8  2.0 - 7.7 thou/uL Final     Lymphocytes Absolute 11/23/2020 2.6  0.8 - 4.4 thou/uL Final     Monocytes Absolute 11/23/2020 0.3  0.0 - 0.9 thou/uL Final     Eosinophils Absolute 11/23/2020 0.1  0.0 - 0.4 thou/uL Final     Basophils Absolute 11/23/2020 0.0  0.0 - 0.2 thou/uL Final     Microalbumin, Random Urine 11/23/2020 <0.50  0.00 - 1.99 mg/dL Final     Creatinine, Urine 11/23/2020 132.7  mg/dL Final     Microalbumin/Creatinine Ratio Rand* 11/23/2020    Final     Color, UA 11/23/2020 Yellow  Colorless, Yellow, Straw, Light Yellow Final     Clarity, UA 11/23/2020 Clear  Clear Final     Glucose, UA 11/23/2020 Negative  Negative Final     Bilirubin, UA 11/23/2020 Negative  Negative Final     Ketones, UA 11/23/2020 Negative  Negative Final     Specific Gravity, UA 11/23/2020 1.020  1.005 - 1.030 Final     Blood, UA 11/23/2020 Trace* Negative Final     pH, UA 11/23/2020 6.5  5.0 - 8.0 Final     Protein, UA 11/23/2020 Negative  Negative mg/dL Final     Urobilinogen, UA 11/23/2020 0.2 E.U./dL  0.2 E.U./dL, 1.0 E.U./dL Final     Nitrite, UA 11/23/2020 Negative  Negative Final     Leukocytes, UA 11/23/2020 Small* Negative Final     Bacteria, UA 11/23/2020 Moderate* None Seen hpf Final     RBC, UA 11/23/2020 0-2  None Seen, 0-2 hpf Final     WBC, UA 11/23/2020 10-25* None Seen, 0-5 hpf Final     Squam Epithel, UA 11/23/2020 * None Seen, 0-5 lpf Final     Mucus, UA 11/23/2020 Few* None Seen lpf Final     COVID-19 Antibody Screen 11/23/2020 Negative   Final     COVID-19 IgG Titer 11/23/2020 Not Applicable   Final     Culture 11/23/2020 No Growth   Final       --  Review of Systems  Wt Readings from Last 3 Encounters:   08/07/20 142 lb (64.4 kg)   01/09/20 136 lb (61.7  "kg)   12/27/19 136 lb (61.7 kg)     Temp Readings from Last 3 Encounters:   12/17/19 98  F (36.7  C) (Oral)   12/17/19 98.4  F (36.9  C) (Oral)     BP Readings from Last 3 Encounters:   12/01/20 (!) 88/60   01/09/20 112/68   12/27/19 110/74     Pulse Readings from Last 3 Encounters:   12/01/20 76   01/09/20 72   12/27/19 74      LMP 12/15/2020  unable to assess today Pain Score: moderate  Pain Location: generalized     As noted in the subjective section above, otherwise a 10 point review of systems is negative. Limited ability to assess given virtual nature of visit. Review of symptoms based entirely on patient's verbal report and what writer is able to assess via camera if used during appointment.    --  Mental Status Examination    Appearance: appears stated age, clean, well groomed, casually dressed appropriate for weather   Orientation: Patient alert and oriented to person, place, time, and situation  Reliability:  Patient appears to be an adequate historian.   Behavior: Patient makes good eye contact and engages with normal rapport in the interview.   There is no evidence of responding to hallucinations or flashbacks.  Speech: Speech is spontaneous and coherent, with a normal rate, rhythm, and tone.    Language: There are no difficulties with expressive or receptive language as observed throughout the interview.    Mood: Described as \"little better\".    Affect: Congruent and shows a normal range and level of reactivity.  Judgement: Able to make basic decision regarding safety.  Insight: Good awareness of physical and mental health conditions and aware of needs around care for these.  Gait and station: unable to assess   Thought process: Logical   Thought content: No evidence of delusions or paranoia.  No thoughts of self-harm or suicide. No thoughts of harming others.  Associations: Connected  Fund of knowledge: Average  Attention / Concentration: Able to remain focused during the interview with minimal " distractibility or need for redirection.  Short Term Memory: Grossly intact as evidence by client recalling themes and ideas discussed.  Long Term Memory: Intact  Motor Status: No recent apparent change.  No current tremor.    --  Diagnostic Impression:  1. Fibromyalgia  - propranoloL (INDERAL) 10 MG tablet; Take 1 tablet (10 mg total) by mouth 3 (three) times a day as needed (anxiety).  Dispense: 180 tablet; Refill: 2  - gabapentin (NEURONTIN) 800 MG tablet; Take 1 tablet (800 mg total) by mouth 3 (three) times a day.  Dispense: 90 tablet; Refill: 2    2. Current moderate episode of major depressive disorder without prior episode (H)  - gabapentin (NEURONTIN) 800 MG tablet; Take 1 tablet (800 mg total) by mouth 3 (three) times a day.  Dispense: 90 tablet; Refill: 2    3. Anxiety disorder due to medical condition  - gabapentin (NEURONTIN) 800 MG tablet; Take 1 tablet (800 mg total) by mouth 3 (three) times a day.  Dispense: 90 tablet; Refill: 2    --  Medical Decision-Making   Anahi Stockton is a 40 y.o. female who presents for ongoing outpatient psychiatric care. Referred to this writer by PCP for evaluation of mental health. Established with therapist, Carolyne Figueredo, every other week for individual therapy. Medically complex: has Allergic rhinitis; Depressive disorder; Anxiety; Other chronic pain; and Fibromyalgia on their problem list. Past medication trials include: Cymbalta and citalopram.     Improvements to depression and anxiety reported since antidepressant increase. Residual anxiety symptoms. Increase today gabapentin for anxiety and pain. Encouraged to use propranolol PRN. Denies any side effects outside of nausea associated with antidepressant which has been improving. No other medication changes indicated today. Work, finances, personal health, and family health conditions ongoing stressors. Reviewed and in agreement with therapist recommendations for returning to work. Created note reflecting work plan  and sent to patient through [employer]. Reviewed recent lab work. No other lab work indicated today. Encouraged lifestyle changes to support improving mental health and new fibromyalgia diagnosis.     No suicidal and homicidal ideation. Not at imminent risk this visit. Educated on need to seek emergent services should they become a risk to themselves or others. Anahi Stockton verbalized understanding and agreement with this safety plan.     --  Plan  1. Continue to monitor for safety  2. Current Medications  1. Continue sertraline 100mg daily for depression and anxiety  2. Continue propranolol 10mg three times a day PRN for anxiety. Patient educated to hold medication if symptomatic for hypotension  3. TITRATE gabapentin 600mg to 800mg 3 times daily for anxiety and chronic pain  4. REFILLS: see above  1. Labs ordered this visit: n/a   2. Complete neuropsychiatric testing to evaluate for other contributing factors for current symptoms  3. Continue individual psychotherapy appointments for mood stabilization and nonpharmacologic coping. Collaborate with interdisciplinary care team as needed.  3. Patient will continue abstinence from drugs and alcohol  4. Patient to return to clinic in 4 weeks for evaluation of medication trials and continued assessment. Ongoing patient psychoeducation regarding chronic illness and treatment.   1. Patient educated that they may schedule sooner appointment or contact writer for any worsening or lack of improvement in symptoms.   5. I reviewed the potential risks, side effects, and benefits of all medications with the patient. Patient verbalized understanding and was encouraged to call clinic with further questions or concerns.    START TIME: 3:30 PM  END TIME: 4:10 PM    Appointment duration: 40 minutes with > 75% spent on coordination of care and psycho-education regarding illness symptoms, discussion of fibromyalgia, relationship between stress and disease processes, neurobiological basis  of disease, exercise, alternative interventions, sleep hygiene, safety planning, return to work planning, care planning, and pharmacology.    Dr. Mónica Bills, DNP, APRN, PMHNP-BC  Nurse Practitioner - Psychiatry    This medical report was made using Dragon Dictation. Spelling and grammatical errors with Dragon exist and are not intentional.

## 2021-06-15 NOTE — PROGRESS NOTES
Outpatient Mental Health Treatment Plan    Name:  Anahi Stockton  :  1980  MRN:  [id number]    Treatment Plan:  Updated Treatment Plan  Intake/initial treatment plan date:  DA completed on 20  Benefit and risks and alternatives have been discussed: Yes  Is this treatment appropriate with minimal intrusion/restrictions: Yes  Estimated duration of treatment:  Approximately 10 sessions.  Anticipated frequency of services:  Every 1-2 weeks  Necessity for frequency: This frequency is needed to establish therapeutic goals and for continuity of care in order to monitor progress.  Necessity for treatment: To address cognitive, behavioral, and/or emotional barriers in order to work toward goals and to improve quality of life.    Session Type: Patient is presenting for an Individual session. via video conference using [employer] application    Plan:           ?   ? Anxiety    Goal:  Decrease average anxiety level from 2 to 1.   Strategies: ? [x]Learn and practice relaxation techniques and other coping strategies (e.g., thought stopping, reframing, meditation)     ? [x] Increase involvement in meaningful activities     ? [x] Discuss sleep hygiene     ? [x] Explore thoughts and expectations about self and others     ? [x] Identify and monitor triggers for panic/anxiety symptoms     ? [x] Implement physical activity routine (with physician approval)     ? [x] Consider introduction of bibliotherapy and/or videos     ? [x] Continue compliance with medical treatment plan (or explore barriers)   ?Degree to which this is a problem: 3  Degree to which goal is met: 2.5  Date of Review: 2021         ? Depression    Goal:  Maintain average depression level around 1.   Strategies:    ?[x] Decrease social isolation     [x] Increase involvement in meaningful activities     ?[x] Discuss sleep hygiene     ?[x] Explore thoughts and expectations about self and others     ?[x] Process grief (loss of significant person, independence,  role, etc.)     ?[x] Assess for suicide risk     ?[x] Implement physical activity routine (with physician approval)     [x] Consider introduction of bibliotherapy and/or videos     [x] Continue compliance with medical treatment plan (or explore barriers) ?  Degree to which this is a problem: 2  Degree to which goal is met: 2.5  Date of Review: June 2021           Functional Impairment:  1=Not at all/Rarely  2=Some days  3=Most Days  4=Every Day    Personal : 3  Family : 2  Social : 2   Work/school : 2    Diagnosis:  (EXAMPLE of DSM V: Major depressive disorder, recurrent, moderate; Generalized Anxiety disorder; borderline personality per patient PHI; fibromyalgia, History of breast cancer in remission; Problem with primary relationship.)   Adjustment Disorder with mixed anxiety and depressed mood  R/O generalized anxiety disorder  R/O Major depressive disorder    Clinical assessments and measures completed:  MING-7 = 21 (7/2/2020)  PHQ=9 = 23 (7/2/2020)  C-SSRS = low risk    MING-7 = 16 (10/30/2020)  PHQ-9 = 12 (10/30/2020)    MING-7 = 7 (2/16/2021)  PHQ-9 = 10 (2/16/2021)     Strengths:  intelligent, articulate, insightful, motivated   Limitations:  lack of mental health experience  Cultural Considerations: Patient is a 40 year old [place] woman. She immigrated to the [place] at age 22.     Persons responsible for this plan: Patient and Provider  Pt unable to sign due to virtual visit          Psychotherapist Signature           Patient Signature:              Guardian Signature             Provider: Performed and documented by KI Josue   Date:  2/16/2021

## 2021-06-15 NOTE — TELEPHONE ENCOUNTER
"Pt had TDP vaccine at physical exam yesterday (3/8/21).    Has opportunity to receive 1st covid dose (Moderna) on March 11th.  Discussed ideal CDC advice is to wait at least two weeks between vaccines.  However pt fears she will not have another opportunity to receive 1st Moderna dose for many more weeks (if not accepting dose March 11th).  Pt works at a school.  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    Therefore pt requests provider advice on advisability of receiving 1st Moderna dose on March 11th (72 hours after TDP) instead of waiting two weeks ?    Please advise ....    Best phone # for patient -> [phone].  Detailed voicemail okjesse.    Thank you-    Ambika Anders RN  [facility] Triage     Reason for Disposition    COVID-19 vaccine, Frequently Asked Questions (FAQs)    Additional Information    Negative: [1] Difficulty breathing or swallowing AND [2] starts within 2 hours after injection    Negative: Sounds like a life-threatening emergency to the triager    Negative: [1] COVID-19 exposure AND [2] no symptoms    Negative: [1] Typical COVID-19 symptoms AND [2] symptoms that are NOT expected from vaccine (e.g., cough, difficulty breathing, loss of taste or smell, runny nose, sore throat)    Negative: [1] Typical COVID-19 symptoms AND [2] started > 3 days after getting vaccine    Negative: Fever > 104 F (40 C)    Negative: Sounds like a severe, unusual reaction to the triager    Negative: [1] Redness or red streak around the injection site AND [2] started > 48 hours after getting vaccine AND [3] fever    Negative: [1] Fever > 101 F (38.3 C) AND [2] age > 60 AND [3] started > 48 hours after getting vaccine    Negative: [1] Fever > 100.0 F (37.8 C) AND [2] bedridden (e.g., nursing home patient, CVA, chronic illness, recovering from surgery) AND [3] started > 48 hours after getting vaccine    " Negative: [1] Fever > 100.0 F (37.8 C) AND [2] diabetes mellitus or weak immune system (e.g., HIV positive, cancer chemo, splenectomy, organ transplant, chronic steroids) AND [3] started > 48 hours after getting vaccine    Negative: [1] Redness or red streak around the injection site AND [2] started > 48 hours after getting vaccine AND [3] no fever  (Exception: red area < 1 inch or 2.5 cm wide)    Negative: [1] Pain, tenderness, or swelling at the injection site AND [2] over 3 days (72 hours) since vaccine AND [3] getting worse    Negative: Fever > 100.0 F (37.8 C) present > 3 days (72 hours)    Negative: [1] Fever > 100.0 F (37.8 C) AND [2] healthcare worker    Negative: [1] Pain, tenderness, or swelling at the injection site AND [2] lasts > 7 days    Negative: [1] Requesting COVID-19 vaccine AND [2] healthcare worker (e.g., EMS first responders, doctors, nurses)    Negative: [1] Requesting COVID-19 vaccine AND [2] resident of a long-term care facility (e.g., nursing home)    Negative: [1] Requesting COVID-19 vaccine AND [2] vaccine available in the community for this patient group    Negative: COVID-19 vaccine, injection site reaction (e.g., pain, redness, swelling), question about    Negative: COVID-19 vaccine, systemic reactions (e.g., fatigue, fever, muscle aches), questions about    Protocols used: CORONAVIRUS (COVID-19) VACCINE QUESTIONS AND [id number] 1.3.21

## 2021-06-15 NOTE — TELEPHONE ENCOUNTER
The COVID-19 vaccine must be  from other vaccines by 14 days.  In reality, it's probably safe, but she would likely get turned down by whoever is doing the COVID-19  vaccines.

## 2021-06-15 NOTE — PATIENT INSTRUCTIONS - HE
"1. Continue medications as prescribed  2. Have your pharmacy contact us for a refill if you are running low on medications (We may ask you to come into clinic to get a refill from the nurse)  3. No alcohol or drug use  4. No driving if sedated  5. Contact the clinic with any questions or concerns   a. Phone: [phone]  b. Fax: [phone]  6. Reach out for help if you feel like hurting yourself or others:   a. [facility]: [address] [place] [address], [address] (phone: [phone])  b. [facility] Suicide Hotline: [phone]   c. Crisis Texting Line: Text \"[place]\" to [phone]  d. Call 911 or go to nearest Emergency room   7. Follow up as directed in 3 months by video for your appointment    "

## 2021-06-15 NOTE — PROGRESS NOTES
This video/telephone visit will be conducted via a call between you and your physician/provider. We have found that certain health care needs can be provided without the need for an in-person physical exam. This service lets us provide the care you need with a video /telephone conversation. If a prescription is necessary we can send it directly to your pharmacy. If lab work is needed we can place an order for that and you can then stop by our lab to have the test done at a later time.    Just as we bill insurance for in-person visits, we also bill insurance for video/telephone visits. If you have questions about your insurance coverage, we recommend that you speak with your insurance company.    Patient has given verbal consent for video/Telephone visit? yes  Patient would like the video visit invitation sent by: Text to cell phone: LESVIA Send to email: "[employer]" or Sounder CALL to:  LESVIA KNUTSON/GIA : Delgado BOBBY LPN    Patient verified allergies, medications and pharmacy via phone. PHQ : 8 and MING: 4 done verbally with writer. Patient states she  is ready for visit.    : provider to review    ________________________________________  Medications Phoned  to Pharmacy [] yes [x]no  Name of Pharmacist:  List Medications, including dose, quantity and instructions    Medications ordered this visit were e-scribed.  Verified by order class [x] yes  [] no  Gabapentin 800 mg, Propranolol, and Zoloft  Medication changes or discontinuations were communicated to patient's pharmacy: [] yes  [x] no    Dictation completed at time of chart check: [] yes  [x] no    I have checked the documentation for today s encounters and the above information has been reviewed and completed.

## 2021-06-15 NOTE — PROGRESS NOTES
"Telemedicine Visit: The patient's condition can be safely assessed and treated via synchronous audio and visual telemedicine encounter.      Reason for Telemedicine Visit: Patient unable to travel    Originating Site (Patient Location): Patient's home    Distant Site (Provider Location): Provider Remote Setting- Home Office    Consent:  The patient/guardian has verbally consented to: the potential risks and benefits of telemedicine (video visit) versus in person care; bill my insurance or make self-payment for services provided; and responsibility for payment of non-covered services.     Mode of Communication:  Video Conference via [employer]    As the provider I attest to compliance with applicable laws and regulations related to telemedicine.    Outpatient Psychiatric Follow Up    Date of Service: 2/23/2021    --  Chief Complaint: \"I have been feeling good. I feel that both my mental and physical health have been improving\"     --  History of Present Illness/Client Impression of Mental Health Consult:    Anahi Stockton is a 40 y.o. female who presents for virtual visit follow up appointment. Last visit occurred on 12/29/20. At that time titrated gabapentin.    Feels last medication increase helpful for anxiety and fibromyalgia pain. Has also been starting to exercise and incorporate lifestyle changes per education last visit. Has also been working to improve her diet. Working three days a week which feels manageable balance for work. Psychotherapy continues to have positive impact per her report. Identifies also knowledge of diagnosis as major influencer in improvements. Asks about ongoing work schedule and expresses financial concerns. Asking for return to work letter supporting full return without restrictions on March 15th, 2021. Expresses confidence in ability to return fully at that time.     States mood is \"much better\". Rates depression minimal and anxiety improved. No new sleep or energy maintenance " concerns. No appetite or weight concerns. No suicidal and homicidal ideation. No overt psychosis. Denies all other psychiatric symptoms. Ongoing chronic pain improved. No new physical concerns.     Medication adherence: Reviewed risk/benefits of medication , Patient able to verbalize understanding of side effects  and Patient verbally consents to taking medications  Medication side effects: none  The patient was given information on medications: currently prescribed    --  [place] Prescription Monitoring Program  No worrisome pharmacy activity.     --  Clinical Outcomes Measures:  PHQ-9 Total Score: 8  MING-7: 4    --  Current Medications:  Current Outpatient Medications   Medication Sig Dispense Refill     acetaminophen (TYLENOL) 500 MG tablet Take 500 mg by mouth every 6 (six) hours as needed for pain.       cyclobenzaprine (FLEXERIL) 10 MG tablet Take 1 tab p.o. qhs (if feeling groggy the following morning, can try taking half a tablet instead or can take earlier the night before). 30 tablet 1     ergocalciferol (ERGOCALCIFEROL) 1,250 mcg (50,000 unit) capsule Take 1 capsule (50,000 Units total) by mouth every 7 days. 12 capsule 0     gabapentin (NEURONTIN) 800 MG tablet Take 1 tablet (800 mg total) by mouth 3 (three) times a day. 90 tablet 2     ibuprofen (ADVIL,MOTRIN) 200 MG tablet Take 400 mg by mouth every 6 (six) hours as needed for pain.       multivitamin therapeutic tablet Take 1 tablet by mouth daily.       propranoloL (INDERAL) 10 MG tablet Take 1 tablet (10 mg total) by mouth 3 (three) times a day as needed (anxiety). 180 tablet 2     sertraline (ZOLOFT) 100 MG tablet Take 1 tablet (100 mg total) by mouth daily. 30 tablet 2     cholecalciferol, vitamin D3, 1,000 unit (25 mcg) tablet Take 2,000 Units by mouth daily.        No current facility-administered medications for this visit.        --  Allergies  No Known Allergies  --  Summary of Diagnostic Studies  No visits with results within 30 Day(s)  from this visit.   Latest known visit with results is:   Lab on 11/23/2020   Component Date Value Ref Range Status     Hepatitis C Ab 11/23/2020 Negative  Negative Final     CCP IgG Antibodies 11/23/2020 <0.5  <=4.9 U/mL Final     CK, Total 11/23/2020 148  30 - 190 U/L Final     Creatinine 11/23/2020 0.73  0.60 - 1.10 mg/dL Final     GFR MDRD Af Amer 11/23/2020 >60  >60 mL/min/1.73m2 Final     GFR MDRD Non Af Amer 11/23/2020 >60  >60 mL/min/1.73m2 Final     ALT 11/23/2020 14  0 - 45 U/L Final     AST 11/23/2020 15  0 - 40 U/L Final     DNA (ds) Antibody 11/23/2020 2  <25 IU Final     Sed Rate 11/23/2020 7  0 - 20 mm/hr Final     CRP 11/23/2020 <0.1  0.0 - 0.8 mg/dL Final     Anti-Neutrophil Cytoplasmic Ab, IgG 11/23/2020 <1:20  <1:20 Final     Angiotensin Converting Enzyme 11/23/2020 20  9 - 67 U/L Final     B27 Test Method 11/23/2020 SBT   Final     B locus 11/23/2020 B27 Neg   Final     CCP IgG Antibodies 11/23/2020 <0.5  <=4.9 U/mL Final     Aldolase 11/23/2020 2.8  1.5 - 8.1 U/L Final     Shreya-1 Autoantibodies 11/23/2020 0  <20 EU Final     Myoglobin 11/23/2020 37  <=90 mcg/L Final     CK, Total 11/23/2020 143  30 - 190 U/L Final     Vitamin D, Total (25-Hydroxy) 11/23/2020 29.8* 30.0 - 80.0 ng/mL Final     Albumin 11/23/2020 4.1  3.5 - 5.0 g/dL Final     Calcium 11/23/2020 8.9  8.5 - 10.5 mg/dL Final     WBC 11/23/2020 5.7  4.0 - 11.0 thou/uL Final     RBC 11/23/2020 4.42  3.80 - 5.40 mill/uL Final     Hemoglobin 11/23/2020 13.3  12.0 - 16.0 g/dL Final     Hematocrit 11/23/2020 40.1  35.0 - 47.0 % Final     MCV 11/23/2020 91  80 - 100 fL Final     MCH 11/23/2020 30.2  27.0 - 34.0 pg Final     MCHC 11/23/2020 33.3  32.0 - 36.0 g/dL Final     RDW 11/23/2020 11.0  11.0 - 14.5 % Final     Platelets 11/23/2020 200  140 - 440 thou/uL Final     MPV 11/23/2020 8.8  7.0 - 10.0 fL Final     Neutrophils % 11/23/2020 49* 50 - 70 % Final     Lymphocytes % 11/23/2020 45* 20 - 40 % Final     Monocytes % 11/23/2020 5  2 - 10 %  Final     Eosinophils % 11/23/2020 1  0 - 6 % Final     Basophils % 11/23/2020 0  0 - 2 % Final     Neutrophils Absolute 11/23/2020 2.8  2.0 - 7.7 thou/uL Final     Lymphocytes Absolute 11/23/2020 2.6  0.8 - 4.4 thou/uL Final     Monocytes Absolute 11/23/2020 0.3  0.0 - 0.9 thou/uL Final     Eosinophils Absolute 11/23/2020 0.1  0.0 - 0.4 thou/uL Final     Basophils Absolute 11/23/2020 0.0  0.0 - 0.2 thou/uL Final     Microalbumin, Random Urine 11/23/2020 <0.50  0.00 - 1.99 mg/dL Final     Creatinine, Urine 11/23/2020 132.7  mg/dL Final     Microalbumin/Creatinine Ratio Rand* 11/23/2020    Final     Color, UA 11/23/2020 Yellow  Colorless, Yellow, Straw, Light Yellow Final     Clarity, UA 11/23/2020 Clear  Clear Final     Glucose, UA 11/23/2020 Negative  Negative Final     Bilirubin, UA 11/23/2020 Negative  Negative Final     Ketones, UA 11/23/2020 Negative  Negative Final     Specific Gravity, UA 11/23/2020 1.020  1.005 - 1.030 Final     Blood, UA 11/23/2020 Trace* Negative Final     pH, UA 11/23/2020 6.5  5.0 - 8.0 Final     Protein, UA 11/23/2020 Negative  Negative mg/dL Final     Urobilinogen, UA 11/23/2020 0.2 E.U./dL  0.2 E.U./dL, 1.0 E.U./dL Final     Nitrite, UA 11/23/2020 Negative  Negative Final     Leukocytes, UA 11/23/2020 Small* Negative Final     Bacteria, UA 11/23/2020 Moderate* None Seen hpf Final     RBC, UA 11/23/2020 0-2  None Seen, 0-2 hpf Final     WBC, UA 11/23/2020 10-25* None Seen, 0-5 hpf Final     Squam Epithel, UA 11/23/2020 * None Seen, 0-5 lpf Final     Mucus, UA 11/23/2020 Few* None Seen lpf Final     COVID-19 Antibody Screen 11/23/2020 Negative   Final     COVID-19 IgG Titer 11/23/2020 Not Applicable   Final     Culture 11/23/2020 No Growth   Final       --  Review of Systems  Wt Readings from Last 3 Encounters:   08/07/20 142 lb (64.4 kg)   01/09/20 136 lb (61.7 kg)   12/27/19 136 lb (61.7 kg)     Temp Readings from Last 3 Encounters:   12/17/19 98  F (36.7  C) (Oral)   12/17/19  "98.4  F (36.9  C) (Oral)     BP Readings from Last 3 Encounters:   12/01/20 (!) 88/60   01/09/20 112/68   12/27/19 110/74     Pulse Readings from Last 3 Encounters:   12/01/20 76   01/09/20 72   12/27/19 74      LMP 02/12/2021  unable to assess today Pain Score: moderate-minimal  Pain Location: generalized     As noted in the subjective section above, otherwise a 10 point review of systems is negative. Limited ability to assess given virtual nature of visit. Review of symptoms based entirely on patient's verbal report and what writer is able to assess via camera if used during appointment.    --  Mental Status Examination    Appearance: appears stated age, clean, well groomed, casually dressed appropriate for weather   Orientation: Patient alert and oriented to person, place, time, and situation  Reliability:  Patient appears to be an adequate historian.   Behavior: Patient makes good eye contact and engages with normal rapport in the interview.   There is no evidence of responding to hallucinations or flashbacks.  Speech: Speech is spontaneous and coherent, with a normal rate, rhythm, and tone.    Language: There are no difficulties with expressive or receptive language as observed throughout the interview.    Mood: Described as \"better\".    Affect: Congruent and shows a normal range and level of reactivity.  Judgement: Able to make basic decision regarding safety.  Insight: Good awareness of physical and mental health conditions and aware of needs around care for these.  Gait and station: unable to assess   Thought process: Logical   Thought content: No evidence of delusions or paranoia.  No thoughts of self-harm or suicide. No thoughts of harming others.  Associations: Connected  Fund of knowledge: Average  Attention / Concentration: Able to remain focused during the interview with minimal distractibility or need for redirection.  Short Term Memory: Grossly intact as evidence by client recalling themes and " ideas discussed.  Long Term Memory: Intact  Motor Status: No recent apparent change.  No current tremor.    --  Diagnostic Impression:  1. Adjustment disorder with mixed anxiety and depressed mood  - sertraline (ZOLOFT) 100 MG tablet; Take 1 tablet (100 mg total) by mouth daily.  Dispense: 30 tablet; Refill: 2    2. Major depressive disorder with single episode, in partial remission (H)  - sertraline (ZOLOFT) 100 MG tablet; Take 1 tablet (100 mg total) by mouth daily.  Dispense: 30 tablet; Refill: 2  - gabapentin (NEURONTIN) 800 MG tablet; Take 1 tablet (800 mg total) by mouth 3 (three) times a day.  Dispense: 90 tablet; Refill: 2    3. Fibromyalgia  - gabapentin (NEURONTIN) 800 MG tablet; Take 1 tablet (800 mg total) by mouth 3 (three) times a day.  Dispense: 90 tablet; Refill: 2  - propranoloL (INDERAL) 10 MG tablet; Take 1 tablet (10 mg total) by mouth 3 (three) times a day as needed (anxiety).  Dispense: 90 tablet; Refill: 2    4. Anxiety disorder due to medical condition  - gabapentin (NEURONTIN) 800 MG tablet; Take 1 tablet (800 mg total) by mouth 3 (three) times a day.  Dispense: 90 tablet; Refill: 2    --  Medical Decision-Making   Anahi Stockton is a 40 y.o. female who presents for ongoing outpatient psychiatric care. Referred to this writer by PCP for evaluation of mental health. Established with therapist, Carolyne Figueredo, every other week for individual therapy. Medically complex: has Allergic rhinitis; Depressive disorder; Anxiety; Other chronic pain; and Fibromyalgia on their problem list. Past medication trials include: Cymbalta and citalopram.     Improvements to depression and anxiety reported since antidepressant increase. No reported side effects and feels medication combination effective for symptoms. No other medication changes indicated today. Work, finances, personal health, and family health conditions ongoing stressors. Reviewed and in agreement with therapist recommendations for returning to  work. Created note reflecting return to work without restrictions plan and sent to patient through [employer]. Reviewed recent lab work. No other lab work indicated today. Encouraged lifestyle changes to support improving mental health and new fibromyalgia diagnosis.     No suicidal and homicidal ideation. Not at imminent risk this visit. Educated on need to seek emergent services should they become a risk to themselves or others. Anahi Stockton verbalized understanding and agreement with this safety plan.     --  Plan  1. Continue to monitor for safety  2. Current Medications  1. Continue sertraline 100mg daily for depression and anxiety  2. Continue propranolol 10mg three times a day PRN for anxiety. Patient educated to hold medication if symptomatic for hypotension  3. Continue gabapentin 800mg 3 times daily for anxiety and chronic pain  4. REFILLS: see above  1. Labs ordered this visit: n/a   2. Complete neuropsychiatric testing to evaluate for other contributing factors for current symptoms  3. Continue individual psychotherapy appointments for mood stabilization and nonpharmacologic coping. Collaborate with interdisciplinary care team as needed.  3. Patient will continue abstinence from drugs and alcohol  4. Patient to return to clinic in 3 months for evaluation of medication trials and continued assessment. Ongoing patient psychoeducation regarding chronic illness and treatment.   1. Patient educated that they may schedule sooner appointment or contact writer for any worsening or lack of improvement in symptoms.   5. I reviewed the potential risks, side effects, and benefits of all medications with the patient. Patient verbalized understanding and was encouraged to call clinic with further questions or concerns.    START TIME: 11:50 AM  END TIME: 12:10 PM    Appointment duration: 20 minutes with > 75% spent on coordination of care and psycho-education regarding illness symptoms, discussion of fibromyalgia,  relationship between stress and disease processes, neurobiological basis of disease, exercise, alternative interventions, sleep hygiene, safety planning, return to work planning, care planning, and pharmacology.    Dr. Mónica De Paz ([facility]), DNP, APRN, PMHNP-BC  Nurse Practitioner - Psychiatry    This medical report was made using Dragon Dictation. Spelling and grammatical errors with Dragon exist and are not intentional.

## 2021-06-15 NOTE — PROGRESS NOTES
Mental Health Visit Note    Patient: Anahi Stockton    : 1980 MRN: [id number]    2021    Start time: 1:00pm    Stop Time: 1:50pm   Session # 2    Session Type: Patient is presenting for an Individual session.    Anahi Stockton is a 40 y.o. female is being seen today for    Chief Complaint   Patient presents with     MH Follow Up     Psychotherapy follow-up visit for anxiety and depression   .     Telemedicine Visit: The patient's condition can be safely assessed and treated via synchronous audio and visual telemedicine encounter.      Reason for Telemedicine Visit: Services only offered telehealth    Originating Site (Patient Location): Patient's home    Distant Site (Provider Location): Provider Remote Setting    Consent:  The patient/guardian has verbally consented to: the potential risks and benefits of telemedicine (video visit) versus in person care; bill my insurance or make self-payment for services provided; and responsibility for payment of non-covered services.     Mode of Communication:  Video Conference via [employer]     As the provider I attest to compliance with applicable laws and regulations related to telemedicine.    Those present for this visit include patient and therapist    Follow up in regards to ongoing symptom management of anxiety and depression    New symptoms or complaints: None    Functional Impairment:   Personal: 4  Family: 3  Social: 2  Work: 4    Clinical assessment of mental status (mental status exam):   Anahi Stockton presented on time.   She was oriented x3, open and cooperative, and dressed appropriately for this session and weather. Her memory was Normal cognitive functioning .  Her speech was  Within normal.  Language was congruent with speech.  Concentration and focus is Within normal. Psychosis is not noted or reported. She reports her mood is Anxious and Depressed.  Affect is congruent with speech and is Congruent w/content of speech.  Fund of knowledge is  adequate. Insight is adequate for therapy.    Suicidal/Homicidal Ideation present: Patient denies suicidal and homicidal ideations/means or plans.     Patient's impression of their current status:   Patient's impression is that she is not feeling as anxious or depressed compared to intake. She is feeling much better and is able to practice coping strategies (breathing, mindfulness). She has started working 3 days per week and is planning to return to normal schedule in 3 weeks. She feels nervous about this but is also excited to return to a sense of normalcy.   She still experiences some problematic physical symptoms but she feels more tolerant and able to cope. She tries to put thoughts into perspective in order to manage anxious symptoms.   Her girls are both back in school (full-time and hybrid). She constantly thinks about the safety and health of her kids.   She does identify her sleep patterns as problematic due to physical symptoms that are waking her up.     Therapist impression of patients current state:   The patient appears and talkative. The patient displays good insight into the connection between experiences, thoughts, feelings and actions. She has good insight into the presence and impact of anxiety symptoms. She is receptive to therapist feedback around stress management techniques. She has been exercising which has been very helpful.   She is receptive to support around acceptance of chronic illness.     Type of psychotherapeutic technique provided: Client centered and CBT    Progress toward short term goals:Progress as expected, with patient returning for a scheduled visit. She continues to work and try her best to apply learned coping strategies. She has been exercising. She has returned to work. She is managing chronic stress exacerbated by the pandemic.     Review of long term goals:   Treatment Plan updated     MING-7 = 7  PHQ-9 = 10    *assesment measurement scores are significantly lower  compared to intake in July 2020 (see tx plan for comparison)     Clinical Global Impressions  Most recent:  Considering your total clinical experience with this particular patient population, how severe are the patient's symptoms at this time?: 3 - Mildly ill  Compared to the patient's condition at the START of treatment, this patient's condition is:: 2 - Much improved      Diagnosis:   1. Current moderate episode of major depressive disorder without prior episode (H)    2. Anxiety disorder, unspecified type    improving   R/O Generalized Anxiety Disorder   R/O PTSD    Plan and Follow-up:   Plan to follow-up with therapist in 1 month  Consider starting a gratitude journal or taking the time to think and reflect       Discharge Criteria/Planning: Patient will continue with follow-up until therapy can be discontinued without return of signs and symptoms.    I have reviewed the note as documented above.  This accurately captures the substance of my conversation with the patient.    As the provider I attest to compliance with applicable laws and regulations related to telemedicine.  Performed and documented by KI Josue 2/16/2021

## 2021-06-15 NOTE — PATIENT INSTRUCTIONS - HE
Decrease your sertraline to 50 mg for 1-2 weeks.     Then, try stopping altogether.     You might notice some mild dizziness.     The medication I am thinking of starting to improve your energy is Ritalin.

## 2021-06-16 PROBLEM — M79.7 FIBROMYALGIA: Chronic | Status: ACTIVE | Noted: 2020-12-01

## 2021-06-16 PROBLEM — F43.22 ADJUSTMENT DISORDER WITH ANXIOUS MOOD: Status: ACTIVE | Noted: 2021-03-08

## 2021-06-16 PROBLEM — F41.9 ANXIETY: Status: ACTIVE | Noted: 2020-06-22

## 2021-06-16 PROBLEM — G89.29 OTHER CHRONIC PAIN: Status: ACTIVE | Noted: 2020-06-22

## 2021-06-16 NOTE — TELEPHONE ENCOUNTER
Telephone Encounter by Jocelyn Butcher CMA at 1/27/2020  2:14 PM     Author: Jocelyn Butcher CMA Service: -- Author Type: Medical Assistant    Filed: 1/27/2020  2:28 PM Encounter Date: 1/27/2020 Status: Signed    : Jocelyn Butcher CMA (Medical Assistant)       Spoke with the patient and relayed message below from Dr. Dewitt:  Angela Dewitt MD Hanzel, Kristen Care Team Pool 41 minutes ago (1:23 PM)      Please have her clarify what she means by biometric screening and I'm happy to order.  Thanks.    Routing comment      She states that she needs the biometric screening for her health insurance.  If this is done, she will be able to get additional money in her HSA.  She is going to check with her insurance company to see if there is anywhere particular that she should be seen for this and will call if she needs an appointment.  Jocelyn CRANE CMA/RENA....................2:28 PM

## 2021-06-17 ENCOUNTER — HOSPITAL ENCOUNTER (OUTPATIENT)
Dept: MAMMOGRAPHY | Facility: CLINIC | Age: 41
Discharge: HOME | End: 2021-06-17
Attending: FAMILY MEDICINE
Payer: COMMERCIAL

## 2021-06-17 ENCOUNTER — HOSPITAL ENCOUNTER (OUTPATIENT)
Dept: MRI IMAGING | Facility: CLINIC | Age: 41
Discharge: HOME | End: 2021-06-17
Attending: FAMILY MEDICINE
Payer: COMMERCIAL

## 2021-06-17 DIAGNOSIS — Z00.00 ENCOUNTER FOR GENERAL ADULT MEDICAL EXAMINATION WITHOUT ABNORMAL FINDINGS: ICD-10-CM

## 2021-06-17 DIAGNOSIS — I67.1 BRAIN ANEURYSM: ICD-10-CM

## 2021-06-20 NOTE — LETTER
Letter by Angela Dewitt MD at      Author: Angela Dewitt MD Service: -- Author Type: --    Filed:  Encounter Date: 1/17/2020 Status: Signed         January 17, 2020     Patient: Anahi Stockton   YOB: 1980   Date of Visit: 1/17/2020       To Whom It May Concern:    It is my medical opinion that Anahi Stockton should continue to limit her work to half days until she can be further evaluated by a specialist on 1/31/2020.      If you have any questions or concerns, please don't hesitate to call.    Sincerely,        Electronically signed by Angela Dewitt MD

## 2021-06-20 NOTE — LETTER
Letter by Carolyne Figueredo LICSW at      Author: Carolyne Figueredo LICSW Service: -- Author Type: --    Filed:  Encounter Date: 6/16/2020 Status: (Other)         June 16, 2020     Patient: Anahi Stockton   YOB: 1980   Date of Visit: 6/16/2020       To Whom It May Concern:    It is my medical opinion that Anahi Stockton should remain out of work until further notice. Please allow at least 10-12 weeks for patient to seek necessary medical and mental health treatment to improve status and condition. Patient would benefit from time off to allow for follow-up care, assessment and treatment of presenting problem symptoms.      If you have any questions or concerns, please don't hesitate to call.    Sincerely,    Carolyne Figueredo    Electronically signed by KI Josue

## 2021-06-20 NOTE — LETTER
Letter by Angela Dewitt MD at      Author: Angela Dewitt MD Service: -- Author Type: --    Filed:  Encounter Date: 1/9/2020 Status: Signed         January 9, 2020     Patient: Anahi Stockton   YOB: 1980   Date of Visit: 1/9/2020       To Whom It May Concern:    It is my medical opinion that Anahi Stockton can return to work on 1/15/2020.  For one week, she should only work half time (half days) and she can return to her usual schedule on 1/22/2020.  She has needed to be off of work since 12/17/2019 due to illness.    If you have any questions or concerns, please don't hesitate to call.    Sincerely,        Electronically signed by Angela Dewitt MD

## 2021-06-20 NOTE — LETTER
Letter by Angela Dewitt MD at      Author: Angela Dewitt MD Service: -- Author Type: --    Filed:  Encounter Date: 12/27/2019 Status: Signed         December 27, 2019     Patient: Anahi Stockton   YOB: 1980   Date of Visit: 12/27/2019       To Whom It May Concern:    It is my medical opinion that Anahi Stockton needs to be off work from 12/17/19 through 1/6/20 due to illness.    If you have any questions or concerns, please don't hesitate to call.    Sincerely,        Electronically signed by Angela Dewitt MD

## 2021-06-20 NOTE — LETTER
Letter by Mónica De Paz CNP at      Author: Mónica De Paz CNP Service: -- Author Type: --    Filed:  Encounter Date: 6/17/2020 Status: (Other)         June 17, 2020     Patient: Anahi Stockton   YOB: 1980   Date of Visit: 6/17/2020       To Whom It May Concern:    It is my medical opinion that Anahi Stockton should remain out of work until further notice per reviewed assessment and recommendation of her therapist. Please allow at least 10-12 weeks for patient to seek necessary medical and mental health treatment to improve condition. Patient would benefit from time off to allow for follow-up care, assessment and treatment of presenting problem symptoms.            Sincerely,    Dr. Mónica Bills  DNP, APRN, PMHNP-BC

## 2021-06-20 NOTE — LETTER
Letter by Angela Dewitt MD at      Author: Angela Dewitt MD Service: -- Author Type: --    Filed:  Encounter Date: 5/18/2020 Status: (Other)         May 18, 2020     Patient: Anahi Stockton   YOB: 1980   Date of Visit: 5/18/2020       To Whom It May Concern:    It is my medical opinion that Anahi Stockton should continue to be off work through June 30, 2020 due to illness.     If you have any questions or concerns, please don't hesitate to call.    Sincerely,        Electronically signed by Angela Dewitt MD

## 2021-06-21 NOTE — LETTER
Letter by Carolyne Figueredo LICSW at      Author: Carolyne Figueredo LICSW Service: -- Author Type: --    Filed:  Encounter Date: 11/12/2020 Status: (Other)         November 12, 2020     Patient: Anahi Stockton   YOB: 1980   Date of Visit: 11/12/2020       To Whom It May Concern:    It is my clinical opinion that Anahi Stockton may return to work beginning 12/7/20.  She is restricted to working two days per week with no more than 4 hours per day.  It is recommended that she also be provided with opportunities to work remotely when possible.  She will be re-evaluated in 6 to 8 weeks to discuss the possibility of increasing work hours.     Sincerely,        Electronically signed by KI Josue

## 2021-06-21 NOTE — LETTER
Letter by Mónica De Paz CNP at      Author: Mónica De Paz CNP Service: -- Author Type: --    Filed:  Encounter Date: 2/23/2021 Status: (Other)         February 23, 2021     Patient: Anahi Stockton   YOB: 1980   Date of Visit: 2/23/2021       To Whom It May Concern:    It is my medical opinion that Anahi Stockton may return to work on March 15th, 2021 with no restrictions.      Sincerely,        Electronically signed by Mónica De Paz ([facility]), DNP, APRN, PMHNP-BC

## 2021-06-21 NOTE — LETTER
Letter by Mónica De Paz CNP at      Author: Mónica De Paz CNP Service: -- Author Type: --    Filed:  Encounter Date: 12/29/2020 Status: (Other)         December 29, 2020     Patient: Anahi Stockton   YOB: 1980   Date of Visit: 12/29/2020       To Whom It May Concern:    It is my medical opinion that Anahi Stockton will continue with her current work schedule of 2 days per week, at 4 hours per day, until 1/15/21. After 1/15/21, it is recommended that Anahi's hours be increased to 3 days per week at 4 hours per day for 4 weeks. She can then increase her schedule to 3 days per week at 6 hours per day for 4 weeks. This gradual work hour increase will allow for Anahi to eventually return to her regular schedule. She is committed to attending provider appointments and following treatment recommendations during this time in order to make a full recovery.        Sincerely,      Electronically signed by: Dr. Mónica Bills    DNP, APRN, PMHNP-BC

## 2021-06-21 NOTE — LETTER
Letter by Carolyne Figueredo LICSW at      Author: Carolyne Figueredo LICSW Service: -- Author Type: --    Filed:  Encounter Date: 12/23/2020 Status: (Other)         December 23, 2020     Patient: Anahi Stockton   YOB: 1980   Date of Visit: 12/23/2020       To Whom It May Concern:    It is my clinical opinion that Anahi Stockton will continue with her current work schedule of 2 days per week, at 4 hours per day, until 1/15/21.  After 1/15/21, it is recommended that Anahi's hours be increased to 3 days per week at 4 hours per day for 4 weeks.  She can then increase her schedule to 3 days per week at 6 hours per day for 4 weeks. This gradual work hour increase will allow for Anahi to eventually return to her regular schedule. She is committed to attending provider appointments and following treatment recommendations during this time in order to make a full recovery.      Sincerely,        Electronically signed by KI Josue

## 2021-06-21 NOTE — LETTER
Letter by Mónica De Paz CNP at      Author: Mónica De Paz CNP Service: -- Author Type: --    Filed:  Encounter Date: 11/6/2020 Status: (Other)         November 6, 2020     Patient: Anahi Stockton   YOB: 1980   Date of Visit: 11/6/2020       To Whom It May Concern:    It is my medical opinion that Anahi Stockton may return to work on 12/7/20.  She is restricted to working no more than four hours a day and no more than two days a week. She will be re-evaluated to increase working hours on an ongoing basis. Further, it is recommended that she be provided opportunities to work remotely if available during the first two weeks.    Sincerely,        Electronically signed by Mónica Bills, GUILLE, APRN, ENE-BC

## 2021-06-29 NOTE — PROGRESS NOTES
Progress Notes by Erin Osborne LPN at 8/7/2020 11:00 AM     Author: Erin Osborne LPN Service: -- Author Type: Licensed Nurse    Filed: 8/13/2020 12:29 AM Encounter Date: 8/7/2020 Status: Signed    : Erin Osborne LPN (Licensed Nurse)       This video/telephone visit will be conducted via a call between you and your physician/provider. We have found that certain health care needs can be provided without the need for an in-person physical exam. This service lets us provide the care you need with a video /telephone conversation. If a prescription is necessary we can send it directly to your pharmacy. If lab work is needed we can place an order for that and you can then stop by our lab to have the test done at a later time.   Just as we bill insurance for in-person visits, we also bill insurance for video/telephone visits. If you have questions about your insurance coverage, we recommend that you speak with your insurance company.   Patient has given verbal consent for video/Telephone visit? yes  Patient would like the video visit invitation sent by: Text to cell phone: NA or Send to email: [employer]  ZENIA/LPN: AD, LPN  Started on Prozac 8 days ago  Patient verified allergies, medications and pharmacy via phone. PHQ : 23 and MING: 20 done verbally with writer. Patient states she is ready for visit.

## 2021-06-29 NOTE — PROGRESS NOTES
Progress Notes by Tamia Stacy CMA at 7/24/2020  1:30 PM     Author: Tamia Stacy CMA Service: -- Author Type: Certified Medical Assistant    Filed: 7/25/2020 12:31 AM Encounter Date: 7/24/2020 Status: Signed    : Tamia Stacy CMA (Certified Medical Assistant)       This video/telephone visit will be conducted via a call between you and your physician/provider. We have found that certain health care needs can be provided without the need for an in-person physical exam. This service lets us provide the care you need with a video /telephone conversation. If a prescription is necessary we can send it directly to your pharmacy. If lab work is needed we can place an order for that and you can then stop by our lab to have the test done at a later time.   Just as we bill insurance for in-person visits, we also bill insurance for video/telephone visits. If you have questions about your insurance coverage, we recommend that you speak with your insurance company.   Patient has given verbal consent for video/Telephone visit? Yes  Patient would like the video visit invitation sent by: [employer] if connections issues call [phone]  Hortensia FALCON CMA & Tamia HILL CMA   Wants to discuss medications they are not doing enough for her anxiety.  Patient verified allergies, medications and pharmacy via phone. PHQ: 22 and MING: 19 done verbally with writer. Patient states she is ready for visit.    [place]  was reviewed prior too seeing patient today. See below for embedded report.

## 2021-06-29 NOTE — PROGRESS NOTES
Progress Notes by Tamia Stacy CMA at 8/21/2020 11:30 AM     Author: Tamia Stacy CMA Service: -- Author Type: Certified Medical Assistant    Filed: 8/21/2020 12:11 PM Encounter Date: 8/21/2020 Status: Signed    : Tamia Stacy CMA (Certified Medical Assistant)       This video/telephone visit will be conducted via a call between you and your physician/provider. We have found that certain health care needs can be provided without the need for an in-person physical exam. This service lets us provide the care you need with a video /telephone conversation. If a prescription is necessary we can send it directly to your pharmacy. If lab work is needed we can place an order for that and you can then stop by our lab to have the test done at a later time.   Just as we bill insurance for in-person visits, we also bill insurance for video/telephone visits. If you have questions about your insurance coverage, we recommend that you speak with your insurance company.   Patient has given verbal consent for video/Telephone visit? Yes  Patient would like the video visit invitation sent by: [employer], if connection issues please call:  [phone]  ZENIA/GIA HILL CMA  Patient denies plan, intent or SI.  Patient verified allergies, medications and pharmacy via phone. PHQ: 23 and MING: 18 done verbally with writer. Patient states she is ready for visit.    [place]  reviewed prior to appt, please see embedded report below:

## 2021-06-29 NOTE — PROGRESS NOTES
Progress Notes by Erin Osborne LPN at 11/6/2020 11:30 AM     Author: Erin Osborne LPN Service: -- Author Type: Licensed Nurse    Filed: 11/6/2020 12:59 PM Encounter Date: 11/6/2020 Status: Signed    : Erin Osborne LPN (Licensed Nurse)       This video/telephone visit will be conducted via a call between you and your physician/provider. We have found that certain health care needs can be provided without the need for an in-person physical exam. This service lets us provide the care you need with a video /telephone conversation. If a prescription is necessary we can send it directly to your pharmacy. If lab work is needed we can place an order for that and you can then stop by our lab to have the test done at a later time.   Just as we bill insurance for in-person visits, we also bill insurance for video/telephone visits. If you have questions about your insurance coverage, we recommend that you speak with your insurance company.   Patient has given verbal consent for video/Telephone visit? yes  Patient would like the video visit invitation sent by: Text to cell phone: NA or Send to email: [employer]  CMA/LPN : Delgado BOBBY  Pt is asking to replace Gabapentin to 600 mg tab due to problems with ins coverage ( quantity limit )  She prefers a 90 day supply of meds  Needs a return to work note with some limitations ( can be uploaded to [employer])  Patient verified allergies, medications and pharmacy via phone. PHQ : 20 and MING: 20 done verbally with writer. Patient states she is ready for visit.

## 2021-06-30 NOTE — PROGRESS NOTES
Progress Notes by Faith Malik MD at 3/8/2021  8:00 AM     Author: Faith Malik MD Service: -- Author Type: Physician    Filed: 3/8/2021 11:45 PM Encounter Date: 3/8/2021 Status: Signed    : Faith Malik MD (Physician)       FEMALE PREVENTATIVE EXAM    Assessment and Plan:     Patient has been advised of split billing requirements and indicates understanding: Yes    Anahi was seen today for establish care, annual exam and form.    Encounter for general adult medical examination without abnormal findings:   -     Lipid Cascade FASTING  -     Glucose  -     Mammo Screening Bilateral; Future    Low vitamin D level  -     Vitamin D, Total (25-Hydroxy)    Brain aneurysm: will check follow up MRI  -     MR Brain COW Carotid With and Without Contrast; Future    Adjustment disorder with anxious mood: she feels like since this has improved quite a bit over the past several months, she would like to try discontinuing her sertraline. She will taper off by decreasing to 50 mg/day first for a week, then stopping it entirely.     Fibromyalgia: continue gabapentin 800 mg three times a day. She feels like this has helped quite a bit.     Chronic fatigue: Explained that her gabapentin could be contributing to her lack of energy. She does not want to stop her gabapentin, however, since she feels it has helped with her pain and anxiety. After she stops sertraline, we will consider a low-dose stimulant such as Ritalin.      Other orders  -     Tdap vaccine,  6yo or older,  IM        Next follow up:  No follow-ups on file.    Immunization Review  Adult Imm Review: No immunizations due today  No tobacco use    I discussed the following with the patient:   Adult Healthy Living: Importance of regular exercise  Healthy nutrition        Subjective:   Chief Complaint: Anahi Stockton is an 40 y.o. female here for a preventative health visit.    Patient has been advised of split billing requirements and indicates understanding: Yes  HPI:  Here  to establish care today.     A little over a year ago, she had a respiratory virus and after that, started having some pain throughout her body, along with some numbness and tingling. She saw Rheumatology and was eventually diagnosed with fibromyalgia. She had a lot of anxiety throughout the past year related boht to this and to COVID-19. 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 She has been primarily working from home but will be going back on-site full-time next week. She has been worried about her family getting sick. Her  has had non-Hodgkins lymphoma for 20 years and has received treatment 6 times. He is not currently receiving treatment but still has a weakened immune system.     Both daughters have asthma and the older one also has restrictive lung function. Ages 18 and 9. Started going to in-person school 5-6 weeks ago. Oldest daughter is choosing between [place] and [employer] for college in the fall.     Seeing Mónica De Paz DNP for anxiety and has been on sertraline 100 mg/day. However, she feels like her anxiety has improved a lot and she would like to consider discontinuing this medication.     Seeing Carolyne Figueredo for therapy.     She does think gabapentin has helped with physical symptoms (pain, numbness, tingling) and anxiety. She initially tried cymbalta, which was not helpful. She also started exercising again, which helps her energy level. She still feels quite fatigued most of the day. After 8:00, has to go to bed. Feels tired all day.       Was also diagnosed with a brain aneurysm of 1 mm in 12/2019 and was told to repeat her MRI in 1 year. Hasn't done this yet, however.     Healthy Habits  Are you taking a daily aspirin? No  Do you typically exercising at least 40 min, 3-4 times per week?  Yes  Do you usually eat at least 4 servings of fruit and vegetables a day, include whole grains and fiber and avoid regularly eating high  "fat foods? Yes  Have you had an eye exam in the past two years? Yes  Do you see a dentist twice per year? Yes  Do you have any concerns regarding sleep? No    Safety Screen  If you own firearms, are they secured in a locked gun cabinet or with trigger locks? The patient does not own any firearms  Do you feel you are safe where you are living?: Yes (3/8/2021  8:00 AM)  Do you feel you are safe in your relationship(s)?: Yes (3/8/2021  8:00 AM)      Review of Systems:  Please see above.  The rest of the review of systems are negative for all systems.     Cancer Screening       Status Date      PAP SMEAR Next Due 2/13/2023      Done 2/13/2018 PAP SMEAR EXTERNAL RESULT          Patient Care Team:  Faith Malik MD as PCP - General (Family Medicine)  Angela Dewitt MD as Assigned PCP  Ai Witt MD as Assigned Neuroscience Provider  Mónica De Paz CNP as Assigned Behavioral Health Provider        History     Reviewed By Date/Time Sections Reviewed    Angelo Eddy CMA 3/8/2021  8:00 AM Tobacco            Objective:   Vital Signs:   Visit Vitals  /66   Pulse 60   Temp 98.1  F (36.7  C) (Oral)   Resp 16   Ht 5' 5.75\" (1.67 m)   Wt 139 lb 12 oz (63.4 kg)   LMP 03/01/2021 (Approximate)   SpO2 99% Comment: ra   Breastfeeding No   BMI 22.73 kg/m           PHYSICAL EXAM  Gen: The patient appears well, in NAD.    HEENT: PERRL, EOMI. Oral mucosa is moist and pink, normal dentition.  TMs clear, no effusion    Neck: supple. No adenopathy or thyromegaly.    Resp: Lungs are clear, good air entry, no wheezes, rhonchi or rales.    CV: S1 and S2 normal, no murmurs, regular rate and rhythm.    Abd: soft, nontender, nondistended, no appreciable organomegaly or masses    BREAST EXAM: normal without suspicious masses, skin or nipple changes or axillary nodes    PELVIC EXAM: uterus normal size, shape, consistency, no mass or tenderness    Skin: no suspicious moles or skin lesions    Musculoskeletal: Normal gait " and strength.     Neuro: no gross focal deficits, alert and oriented    Psych: affect is bright and appropriate      The ASCVD Risk score ([place] DC Jr., et al., 2013) failed to calculate for the following reasons:    Unable to determine if patient is Non- African American         Medication List          Accurate as of March 8, 2021 11:44 PM. If you have any questions, ask your nurse or doctor.            CONTINUE taking these medications    acetaminophen 500 MG tablet  Also known as: TYLENOL  INSTRUCTIONS: Take 500 mg by mouth every 6 (six) hours as needed for pain.        cholecalciferol (vitamin D3) 1,000 unit (25 mcg) tablet  INSTRUCTIONS: Take 2,000 Units by mouth daily.        cyclobenzaprine 10 MG tablet  Also known as: FLEXERIL  INSTRUCTIONS: Take 1 tab p.o. qhs (if feeling groggy the following morning, can try taking half a tablet instead or can take earlier the night before).        ergocalciferol 1,250 mcg (50,000 unit) capsule  Also known as: ERGOCALCIFEROL  INSTRUCTIONS: Take 1 capsule (50,000 Units total) by mouth every 7 days.  Doctor's comments: Hold OTC Vitamin D daily dose while taking this prescription. Can restart once completed therapy.        gabapentin 800 MG tablet  Also known as: NEURONTIN  INSTRUCTIONS: Take 1 tablet (800 mg total) by mouth 3 (three) times a day.  Doctor's comments: Please discontinue previous gabapentin prescriptions.        ibuprofen 200 MG tablet  Also known as: ADVIL,MOTRIN  INSTRUCTIONS: Take 400 mg by mouth every 6 (six) hours as needed for pain.        multivitamin therapeutic tablet  INSTRUCTIONS: Take 1 tablet by mouth daily.        propranoloL 10 MG tablet  Also known as: INDERAL  INSTRUCTIONS: Take 1 tablet (10 mg total) by mouth 3 (three) times a day as needed (anxiety).        sertraline 100 MG tablet  Also known as: ZOLOFT  INSTRUCTIONS: Take 1 tablet (100 mg total) by mouth daily.               Additional Screenings Completed Today:   None.     Faith  MD [place]

## 2021-06-30 NOTE — PROGRESS NOTES
Progress Notes by Tamia Stacy CMA at 12/29/2020  3:30 PM     Author: Tamia Stacy CMA Service: -- Author Type: Certified Medical Assistant    Filed: 12/29/2020  4:27 PM Encounter Date: 12/29/2020 Status: Signed    : Tamia Stayc CMA (Certified Medical Assistant)       This video/telephone visit will be conducted via a call between you and your physician/provider. We have found that certain health care needs can be provided without the need for an in-person physical exam. This service lets us provide the care you need with a video /telephone conversation. If a prescription is necessary we can send it directly to your pharmacy. If lab work is needed we can place an order for that and you can then stop by our lab to have the test done at a later time.   Just as we bill insurance for in-person visits, we also bill insurance for video/telephone visits. If you have questions about your insurance coverage, we recommend that you speak with your insurance company.   Patient has given verbal consent for video/Telephone visit? Yes  Patient would like the video visit invitation sent by: [place], if connection issues, please call:  [phone]  ZENIA/GIA HILL CMA    Recently diagnosed with Fibromyalgia and was advised to speak to provider regarding increase in Gabapentin to help manage her pain.    Patient verified allergies, medications and pharmacy via phone. PHQ: 17 and MING: 17 done verbally with writer. Patient states she is ready for visit.    [place]  reviewed prior to appt, please see embedded report below:

## 2021-07-03 NOTE — ADDENDUM NOTE
Addendum Note by Erin Osborne LPN at 11/6/2020 11:30 AM     Author: Erin Osborne LPN Service: -- Author Type: Licensed Nurse    Filed: 11/6/2020  3:45 PM Encounter Date: 11/6/2020 Status: Signed    : Erin Osborne LPN (Licensed Nurse)    Addended by: ERIN OSBORNE on: 11/6/2020 03:45 PM        Modules accepted: Orders

## 2021-07-03 NOTE — ADDENDUM NOTE
Addendum Note by Irina Gillis RN at 9/3/2020  4:30 PM     Author: Irina Gillis RN Service: -- Author Type: Registered Nurse    Filed: 9/4/2020  9:06 AM Encounter Date: 9/3/2020 Status: Signed    : Irina Gillis RN (Registered Nurse)    Addended by: IRINA GILLIS on: 9/4/2020 09:06 AM        Modules accepted: Orders

## 2021-07-03 NOTE — ADDENDUM NOTE
Addendum Note by Franky Dewitt MD at 12/27/2019 10:40 AM     Author: Franky Dewitt MD Service: -- Author Type: Physician    Filed: 12/27/2019  1:49 PM Encounter Date: 12/27/2019 Status: Signed    : Franky Dewitt MD (Physician)    Addended by: FRANKY DEWITT on: 12/27/2019 01:49 PM        Modules accepted: Orders

## 2021-07-03 NOTE — ADDENDUM NOTE
Addendum Note by Franky Dewitt MD at 5/4/2020  1:00 PM     Author: Franky Dewitt MD Service: -- Author Type: Physician    Filed: 5/4/2020  1:54 PM Encounter Date: 5/4/2020 Status: Signed    : Franky Dweitt MD (Physician)    Addended by: FRANKY DEWITT on: 5/4/2020 01:54 PM        Modules accepted: Orders

## 2021-08-17 ENCOUNTER — DOCUMENTATION ONLY (OUTPATIENT)
Dept: NEUROLOGY | Facility: CLINIC | Age: 41
End: 2021-08-17

## 2021-08-17 NOTE — PROGRESS NOTES
Ms. Stockton was originally referred for a neuropsychological evaluation in July 2020 by her provider, Mónica De Paz. The patient was originally referred to our colleague, Pantera Bernabe, PhD at [facility]; however, Dr. Bernabe is no longer employed at [facility]. His waitlist of patients was transferred over to us. Our service reached out to Ms. Stockton about scheduling options. She has been unreachable. As we have now called and/or left messages multiple times, with no response, the order for neuropsychological evaluation will now be deferred.     Ms. Stockton is welcome to contact our service at any time ([phone]) should she wish to schedule this evaluation.    Sugey Spangler, PhD, LP, ABPP  Clinical Neuropsychologist  [facility]  [phone]

## 2021-09-19 ENCOUNTER — HEALTH MAINTENANCE LETTER (OUTPATIENT)
Age: 41
End: 2021-09-19

## 2021-09-21 ENCOUNTER — IMMUNIZATION (OUTPATIENT)
Dept: FAMILY MEDICINE | Facility: CLINIC | Age: 41
End: 2021-09-21
Payer: COMMERCIAL

## 2021-09-21 PROCEDURE — 90686 IIV4 VACC NO PRSV 0.5 ML IM: CPT

## 2021-09-21 PROCEDURE — G0008 ADMIN INFLUENZA VIRUS VAC: HCPCS

## 2022-04-19 ENCOUNTER — OFFICE VISIT (OUTPATIENT)
Dept: FAMILY MEDICINE | Facility: CLINIC | Age: 42
End: 2022-04-19
Payer: COMMERCIAL

## 2022-04-19 VITALS
SYSTOLIC BLOOD PRESSURE: 110 MMHG | WEIGHT: 140.6 LBS | BODY MASS INDEX: 22.6 KG/M2 | HEIGHT: 66 IN | DIASTOLIC BLOOD PRESSURE: 60 MMHG | HEART RATE: 71 BPM | RESPIRATION RATE: 18 BRPM

## 2022-04-19 DIAGNOSIS — Z00.8 ENCOUNTER FOR BIOMETRIC SCREENING: ICD-10-CM

## 2022-04-19 DIAGNOSIS — Z00.00 ROUTINE GENERAL MEDICAL EXAMINATION AT A HEALTH CARE FACILITY: Primary | ICD-10-CM

## 2022-04-19 DIAGNOSIS — I67.1 BRAIN ANEURYSM: ICD-10-CM

## 2022-04-19 DIAGNOSIS — M79.7 FIBROMYALGIA: ICD-10-CM

## 2022-04-19 DIAGNOSIS — R53.83 FATIGUE, UNSPECIFIED TYPE: ICD-10-CM

## 2022-04-19 PROBLEM — F43.22 ADJUSTMENT DISORDER WITH ANXIOUS MOOD: Status: RESOLVED | Noted: 2021-03-08 | Resolved: 2022-04-19

## 2022-04-19 PROBLEM — F41.9 ANXIETY: Status: RESOLVED | Noted: 2020-06-22 | Resolved: 2022-04-19

## 2022-04-19 LAB
CHOLEST SERPL-MCNC: 205 MG/DL
ERYTHROCYTE [DISTWIDTH] IN BLOOD BY AUTOMATED COUNT: 11.9 % (ref 10–15)
FASTING STATUS PATIENT QL REPORTED: YES
FASTING STATUS PATIENT QL REPORTED: YES
FERRITIN SERPL-MCNC: 37 NG/ML (ref 10–130)
GLUCOSE BLD-MCNC: 84 MG/DL (ref 70–125)
HCT VFR BLD AUTO: 40.5 % (ref 35–47)
HDLC SERPL-MCNC: 80 MG/DL
HGB BLD-MCNC: 13.7 G/DL (ref 11.7–15.7)
HIV 1+2 AB+HIV1 P24 AG SERPL QL IA: NEGATIVE
LDLC SERPL CALC-MCNC: 114 MG/DL
MCH RBC QN AUTO: 30.3 PG (ref 26.5–33)
MCHC RBC AUTO-ENTMCNC: 33.8 G/DL (ref 31.5–36.5)
MCV RBC AUTO: 90 FL (ref 78–100)
PLATELET # BLD AUTO: 213 10E3/UL (ref 150–450)
RBC # BLD AUTO: 4.52 10E6/UL (ref 3.8–5.2)
TRIGL SERPL-MCNC: 53 MG/DL
WBC # BLD AUTO: 4.6 10E3/UL (ref 4–11)

## 2022-04-19 PROCEDURE — 85027 COMPLETE CBC AUTOMATED: CPT | Performed by: FAMILY MEDICINE

## 2022-04-19 PROCEDURE — 99396 PREV VISIT EST AGE 40-64: CPT | Performed by: FAMILY MEDICINE

## 2022-04-19 PROCEDURE — 36415 COLL VENOUS BLD VENIPUNCTURE: CPT | Performed by: FAMILY MEDICINE

## 2022-04-19 PROCEDURE — 82306 VITAMIN D 25 HYDROXY: CPT | Performed by: FAMILY MEDICINE

## 2022-04-19 PROCEDURE — 82728 ASSAY OF FERRITIN: CPT | Performed by: FAMILY MEDICINE

## 2022-04-19 PROCEDURE — 87389 HIV-1 AG W/HIV-1&-2 AB AG IA: CPT | Performed by: FAMILY MEDICINE

## 2022-04-19 PROCEDURE — 80061 LIPID PANEL: CPT | Performed by: FAMILY MEDICINE

## 2022-04-19 PROCEDURE — 82947 ASSAY GLUCOSE BLOOD QUANT: CPT | Performed by: FAMILY MEDICINE

## 2022-04-19 RX ORDER — IBUPROFEN 200 MG
200-400 TABLET ORAL EVERY 6 HOURS PRN
COMMUNITY

## 2022-04-19 RX ORDER — CHOLECALCIFEROL (VITAMIN D3) 50 MCG
1 TABLET ORAL DAILY
COMMUNITY

## 2022-04-19 NOTE — PROGRESS NOTES
SUBJECTIVE:   CC: Anahi Stockton is an 41 year old woman who presents for preventive health visit.     Patient has been advised of split billing requirements and indicates understanding: Yes     Healthy Habits:     Getting at least 3 servings of Calcium per day:  Yes    Bi-annual eye exam:  Yes    Dental care twice a year:  Yes    Sleep apnea or symptoms of sleep apnea:  None    Diet:  Regular (no restrictions)    Frequency of exercise:  2-3 days/week    Duration of exercise:  15-30 minutes    Taking medications regularly:  Yes    Medication side effects:  Not applicable    PHQ-2 Total Score: 0    Additional concerns today:  Yes    Last preventive health visit 3/8/2021, as reviewed in Epic.    Patient brings in a Biometric Screening form for completion.  She is fasting today.    Patient is concerned about weight gain.  Weight has only increased 1 pound in the past year, per chart review.  History of chronic fatigue, with continued symptoms.  History of low Vitamin D, currently taking Vitamin D 3000 units daily (Vitamin D + Multivitamin).  Wants to check her iron and Vitamin D level.  TSH was within normal limits in September, per patient     Concerned about fibromyalgia, no longer taking Neurontin.  Patient was concerned that Neurontin would worsen her symptoms over time.  Previous provider recommended Ritalin (per patient), but the patient is concerned about side effects.  Patient tapered off Sertraline 3/8/2021 (history of anxiety), as she was concerned about possible side effects.  No current anxiety or depression symptoms reported.    A Copper IUD was placed September 2021, but the patient states she can't feel the strings.  Regular, monthly menses, lasting 7-8 days per cycle.  Heavy bleeding the first 2-3 days of her cycle, changing a pad or tampon every few hours.  LMP 4/2/2022.  Patient prefers to avoid hormones.    Patient has a history of a brain aneursym.  Patient would like to review her follow up MRI  "from 6/17/2021, which showed an \"unchanged 1 x 2 mm aneurysm versus infundibulum in the left MCA trifurcation\".  Only occasional headaches reported.    Today's PHQ-2 Score:   PHQ-2 ( 1999 Pfizer) 4/17/2022   Q1: Little interest or pleasure in doing things 0   Q2: Feeling down, depressed or hopeless 0   PHQ-2 Score 0   Q1: Little interest or pleasure in doing things Not at all   Q2: Feeling down, depressed or hopeless Not at all   PHQ-2 Score 0       Abuse: Current or Past (Physical, Sexual or Emotional) - No  Do you feel safe in your environment? Yes    Have you ever done Advance Care Planning? (For example, a Health Directive, POLST, or a discussion with a medical provider or your loved ones about your wishes): No, advance care planning information given to patient to review.  Advanced care planning was discussed at today's visit.    Social History     Tobacco Use     Smoking status: Never Smoker     Smokeless tobacco: Never Used   Substance Use Topics     Alcohol use: Not Currently         Alcohol Use 4/17/2022   Prescreen: >3 drinks/day or >7 drinks/week? Not Applicable       Reviewed orders with patient.  Reviewed health maintenance and updated orders accordingly - Yes  Patient Active Problem List   Diagnosis     Nonallergic rhinitis     Other chronic pain     Fibromyalgia     History reviewed. No pertinent surgical history.    Social History     Tobacco Use     Smoking status: Never Smoker     Smokeless tobacco: Never Used   Substance Use Topics     Alcohol use: Not Currently     Family History   Problem Relation Age of Onset     Hypertension Mother      Diabetes Father      Cerebrovascular Disease Father      Hypertension Father      Depression Brother            Breast Cancer Screening:  Last Mammogram 6/17/2021, with 1 year follow up recommended.  Patient would like to do testing every 2 years post risks/benefits discussion.    FHS-7:   Breast CA Risk Assessment (FHS-7) 4/17/2022   Did any of your " "first-degree relatives have breast or ovarian cancer? No   Did any of your relatives have bilateral breast cancer? Unknown   Did any man in your family have breast cancer? No   Did any woman in your family have breast and ovarian cancer? Yes   Did any woman in your family have breast cancer before age 50 y? Yes - Cousin before age 50.  Aunt after age 60.   Do you have 2 or more relatives with breast and/or ovarian cancer? Yes   Do you have 2 or more relatives with breast and/or bowel cancer? Yes     Pertinent mammograms are reviewed under the imaging tab.  Declines genetic testing post discussion today.    History of abnormal Pap smear:  Yes - More than 5 years ago.  PAP / HPV 2/13/2018   HPV See Scanned Report     Reviewed and updated as needed this visit by clinical staff   Tobacco  Allergies  Meds  Problems  Med Hx  Surg Hx  Fam Hx            Reviewed and updated as needed this visit by Provider   Tobacco  Allergies  Meds  Problems  Med Hx  Surg Hx  Fam Hx             Review of Systems   CONSTITUTIONAL:  See above.  NEGATIVE for fever or chills  INTEGUMENTARU/SKIN: NEGATIVE for worrisome rashes, moles or lesions  EYES: NEGATIVE for vision changes or irritation  ENT: NEGATIVE for ear, mouth and throat problems  RESP: NEGATIVE for significant cough or shortness of breath  BREAST: NEGATIVE for masses, tenderness or discharge  CV: NEGATIVE for chest pain, palpitations or peripheral edema  GI: NEGATIVE for nausea, abdominal pain, heartburn, or change in bowel habits  : See above.  MUSCULOSKELETAL: See above.  NEURO: Intermittent headaches.  NEGATIVE for weakness, dizziness or paresthesias  PSYCHIATRIC: NEGATIVE for changes in mood or affect     OBJECTIVE:   /60   Pulse 71   Resp 18   Ht 1.676 m (5' 6\")   Wt 63.8 kg (140 lb 9.6 oz)   LMP 04/04/2022 (Approximate)   BMI 22.69 kg/m    Physical Exam  GENERAL: healthy, alert and no distress  EYES: Eyes grossly normal to inspection, PERRL and " conjunctivae and sclerae normal  HENT: ear canals and TM's normal, nose and mouth without ulcers or lesions  NECK: no adenopathy, no asymmetry, masses, or scars and thyroid normal to palpation  RESP: lungs clear to auscultation - no rales, rhonchi or wheezes  BREAST: discussed with and declined by patient   CV: regular rate and rhythm, normal S1 S2, no S3 or S4, no murmur, click or rub, no peripheral edema and peripheral pulses strong  ABDOMEN: soft, nontender, no hepatosplenomegaly, no masses and bowel sounds normal   (female): Chaperone declined prior to exam.  Normal female external genitalia, normal urethral meatus, vaginal mucosa pink, moist, well rugated, and normal cervix/adnexa/uterus without masses or discharge.  IUD strings are visible, protruding ~1 cm from the cervix.  MS: no gross musculoskeletal defects noted, no edema  SKIN: no suspicious lesions or rashes  NEURO: Normal strength and tone, mentation intact and speech normal  PSYCH: Mentation appears normal.  Affect is anxious, asking to review MRI results and screening options repeatedly.  Patient is upset that she is unable to reestablish primary care today.    ASSESSMENT/PLAN:       ICD-10-CM    1. Routine general medical examination at a health care facility  Z00.00    2. Encounter for biometric screening  Z00.8 Lipid panel reflex to direct LDL Fasting     Lipid panel reflex to direct LDL Fasting     Glucose   3. Fatigue, unspecified type.  Labs are pending, as noted.  Patient continues on Vitamin D supplementation.  Previous thyroid screening/testing was within normal limits, per patient.  Previous history of anxiety, no longer on Sertraline. R53.83 Glucose     Ferritin     CBC with platelets     HIV Antigen Antibody Combo     Vitamin D deficiency screening     Glucose     Ferritin     CBC with platelets     HIV Antigen Antibody Combo     Vitamin D deficiency screening   4. Fibromyalgia, previously on Neurontin.  Patient is interested in  "reestablishing primary care. M79.7    5. Brain aneurysm (1 x 2 mm) stable on 2021 MRI study. I67.1      COUNSELING:  Reviewed preventive health counseling, as reflected in patient instructions  Special attention given to:        Breast cancer screening - Patient declines genetic testing, preferring mammogram screening every 2 years.       HIV screeninx in teen years, 1x in adult years, and at intervals if high risk    Estimated body mass index is 22.69 kg/m  as calculated from the following:    Height as of this encounter: 1.676 m (5' 6\").    Weight as of this encounter: 63.8 kg (140 lb 9.6 oz).    She reports that she has never smoked. She has never used smokeless tobacco.    Patient requests that her Biometric Screening form be faxed and mailed to her home.    Labs were pending at the time of patient discharge.    Recommended that the patient make a follow up/establish care appointment, as she would like to discuss alternative fibromyalgia treatments.    Counseling Resources:  ATP IV Guidelines  Pooled Cohorts Equation Calculator  Breast Cancer Risk Calculator  BRCA-Related Cancer Risk Assessment: FHS-7 Tool  FRAX Risk Assessment  ICSI Preventive Guidelines  Dietary Guidelines for Americans,   USDA's MyPlate  ASA Prophylaxis  Lung CA Screening    Latasha Andre MD  [facility]  "

## 2022-04-20 LAB — DEPRECATED CALCIDIOL+CALCIFEROL SERPL-MC: 32 UG/L (ref 20–75)

## 2022-09-20 ENCOUNTER — MYC MEDICAL ADVICE (OUTPATIENT)
Dept: FAMILY MEDICINE | Facility: CLINIC | Age: 42
End: 2022-09-20

## 2022-09-20 DIAGNOSIS — I67.1 BRAIN ANEURYSM: Primary | ICD-10-CM

## 2022-09-24 ENCOUNTER — IMMUNIZATION (OUTPATIENT)
Dept: FAMILY MEDICINE | Facility: CLINIC | Age: 42
End: 2022-09-24
Payer: COMMERCIAL

## 2022-09-24 PROCEDURE — 90471 IMMUNIZATION ADMIN: CPT

## 2022-09-24 PROCEDURE — 90686 IIV4 VACC NO PRSV 0.5 ML IM: CPT

## 2022-10-11 ENCOUNTER — IMMUNIZATION (OUTPATIENT)
Dept: NURSING | Facility: CLINIC | Age: 42
End: 2022-10-11
Payer: COMMERCIAL

## 2022-10-11 PROCEDURE — 0134A COVID-19,PF,MODERNA BIVALENT: CPT

## 2022-10-11 PROCEDURE — 91313 COVID-19,PF,MODERNA BIVALENT: CPT

## 2023-03-20 ENCOUNTER — MYC MEDICAL ADVICE (OUTPATIENT)
Dept: FAMILY MEDICINE | Facility: CLINIC | Age: 43
End: 2023-03-20
Payer: COMMERCIAL

## 2023-04-24 ENCOUNTER — OFFICE VISIT (OUTPATIENT)
Dept: FAMILY MEDICINE | Facility: CLINIC | Age: 43
End: 2023-04-24
Payer: COMMERCIAL

## 2023-04-24 VITALS
BODY MASS INDEX: 23.14 KG/M2 | RESPIRATION RATE: 16 BRPM | TEMPERATURE: 97.4 F | WEIGHT: 144 LBS | SYSTOLIC BLOOD PRESSURE: 100 MMHG | OXYGEN SATURATION: 100 % | HEIGHT: 66 IN | DIASTOLIC BLOOD PRESSURE: 70 MMHG | HEART RATE: 72 BPM

## 2023-04-24 DIAGNOSIS — R53.82 CHRONIC FATIGUE: ICD-10-CM

## 2023-04-24 DIAGNOSIS — L70.0 ACNE VULGARIS: ICD-10-CM

## 2023-04-24 DIAGNOSIS — Z12.31 VISIT FOR SCREENING MAMMOGRAM: ICD-10-CM

## 2023-04-24 DIAGNOSIS — M67.432 GANGLION CYST OF WRIST, LEFT: ICD-10-CM

## 2023-04-24 DIAGNOSIS — Z12.4 CERVICAL CANCER SCREENING: ICD-10-CM

## 2023-04-24 DIAGNOSIS — L81.6 SKIN HYPOPIGMENTATION: ICD-10-CM

## 2023-04-24 DIAGNOSIS — G56.03 BILATERAL CARPAL TUNNEL SYNDROME: ICD-10-CM

## 2023-04-24 DIAGNOSIS — Z00.00 ROUTINE GENERAL MEDICAL EXAMINATION AT A HEALTH CARE FACILITY: Primary | ICD-10-CM

## 2023-04-24 LAB
CHOLEST SERPL-MCNC: 200 MG/DL
ERYTHROCYTE [DISTWIDTH] IN BLOOD BY AUTOMATED COUNT: 12.2 % (ref 10–15)
FASTING STATUS PATIENT QL REPORTED: NO
FERRITIN SERPL-MCNC: 32 NG/ML (ref 6–175)
GLUCOSE SERPL-MCNC: 89 MG/DL (ref 70–99)
HCT VFR BLD AUTO: 41.2 % (ref 35–47)
HDLC SERPL-MCNC: 80 MG/DL
HGB BLD-MCNC: 13.6 G/DL (ref 11.7–15.7)
LDLC SERPL CALC-MCNC: 110 MG/DL
MCH RBC QN AUTO: 29.4 PG (ref 26.5–33)
MCHC RBC AUTO-ENTMCNC: 33 G/DL (ref 31.5–36.5)
MCV RBC AUTO: 89 FL (ref 78–100)
NONHDLC SERPL-MCNC: 120 MG/DL
PLATELET # BLD AUTO: 228 10E3/UL (ref 150–450)
RBC # BLD AUTO: 4.62 10E6/UL (ref 3.8–5.2)
TRIGL SERPL-MCNC: 52 MG/DL
WBC # BLD AUTO: 5 10E3/UL (ref 4–11)

## 2023-04-24 PROCEDURE — 87624 HPV HI-RISK TYP POOLED RSLT: CPT | Performed by: FAMILY MEDICINE

## 2023-04-24 PROCEDURE — 82728 ASSAY OF FERRITIN: CPT | Performed by: FAMILY MEDICINE

## 2023-04-24 PROCEDURE — 85027 COMPLETE CBC AUTOMATED: CPT | Performed by: FAMILY MEDICINE

## 2023-04-24 PROCEDURE — 80061 LIPID PANEL: CPT | Performed by: FAMILY MEDICINE

## 2023-04-24 PROCEDURE — 99214 OFFICE O/P EST MOD 30 MIN: CPT | Mod: 25 | Performed by: FAMILY MEDICINE

## 2023-04-24 PROCEDURE — 36415 COLL VENOUS BLD VENIPUNCTURE: CPT | Performed by: FAMILY MEDICINE

## 2023-04-24 PROCEDURE — G0145 SCR C/V CYTO,THINLAYER,RESCR: HCPCS | Performed by: FAMILY MEDICINE

## 2023-04-24 PROCEDURE — 82947 ASSAY GLUCOSE BLOOD QUANT: CPT | Performed by: FAMILY MEDICINE

## 2023-04-24 PROCEDURE — 99396 PREV VISIT EST AGE 40-64: CPT | Performed by: FAMILY MEDICINE

## 2023-04-24 RX ORDER — BUPROPION HYDROCHLORIDE 150 MG/1
150 TABLET ORAL EVERY MORNING
Qty: 90 TABLET | Refills: 3 | Status: SHIPPED | OUTPATIENT
Start: 2023-04-24 | End: 2023-08-23

## 2023-04-24 RX ORDER — TRETINOIN 0.5 MG/G
CREAM TOPICAL AT BEDTIME
Qty: 45 G | Refills: 3 | Status: SHIPPED | OUTPATIENT
Start: 2023-04-24

## 2023-04-24 ASSESSMENT — ENCOUNTER SYMPTOMS
ABDOMINAL PAIN: 0
EYE PAIN: 0
FEVER: 0
HEMATOCHEZIA: 0
CONSTIPATION: 0
PARESTHESIAS: 0
WEAKNESS: 0
CHILLS: 0
HEMATURIA: 0
FREQUENCY: 0
BREAST MASS: 0
HEADACHES: 0
MYALGIAS: 1
HEARTBURN: 0
DYSURIA: 0
COUGH: 0
NERVOUS/ANXIOUS: 0
ARTHRALGIAS: 1
NAUSEA: 0
PALPITATIONS: 0
SHORTNESS OF BREATH: 0
DIZZINESS: 0
SORE THROAT: 0
DIARRHEA: 0
JOINT SWELLING: 0

## 2023-04-24 ASSESSMENT — PATIENT HEALTH QUESTIONNAIRE - PHQ9
10. IF YOU CHECKED OFF ANY PROBLEMS, HOW DIFFICULT HAVE THESE PROBLEMS MADE IT FOR YOU TO DO YOUR WORK, TAKE CARE OF THINGS AT HOME, OR GET ALONG WITH OTHER PEOPLE: SOMEWHAT DIFFICULT
SUM OF ALL RESPONSES TO PHQ QUESTIONS 1-9: 7
SUM OF ALL RESPONSES TO PHQ QUESTIONS 1-9: 7

## 2023-04-24 NOTE — PROGRESS NOTES
SUBJECTIVE:   CC: Anahi is an 42 year old who presents for preventive health visit.       4/24/2023     8:55 AM   Additional Questions   Roomed by Mallika Major MA   Accompanied by Self   Patient has been advised of split billing requirements and indicates understanding: Yes  Healthy Habits:     Getting at least 3 servings of Calcium per day:  Yes    Bi-annual eye exam:  Yes    Dental care twice a year:  Yes    Sleep apnea or symptoms of sleep apnea:  None    Diet:  Regular (no restrictions)    Frequency of exercise:  2-3 days/week    Duration of exercise:  15-30 minutes    Taking medications regularly:  Yes    Medication side effects:  None    PHQ-2 Total Score: 2    Additional concerns today:  Yes    Bilateral wrist pain. Thinks it might be fibromyalgia. Gets pain and tingling going up into thumbs. Worse at night. Seems to help if she bends her wrists at nighttime. Also noticed a bump on left ventral wrist about 10 days ago.     Hypopigmentation of skin under right breast. Previously had fungal infection in this area about 10 years ago and wonders if it's the same thing.     Using tretinoin 0.5 for acne. Would like a refill.     Still struggle with fatigue. Pushes herself but then feels awful. It makes her feel anxious when she is very fatigued because she worries she won't be able to get done everything she needs to. Doesn't want to take medication that she would need to be dependent on.    A few years ago, she was dealing more with chronic pain and was diagnosed with fibromyalgia. The fatigue started sometime in 2019. Started after getting a bad flu or cold and never felt the same after that. Now the pain is better overall, she still just has the fatigue. Tries to do exercise for neck and shoulders. Also walks or runs and lifts weights.     Gabapentin helped with pain in the past but had side effects. Gained weight. Also took sertraline and thinks this might have caused weight gain as well.     Ferritin and vitamin  d were on the low side of normal last year. Take Vitamin D 2,000 international unit(s) every day. Also takes multivitamin with iron during her period. Has always had struggles with iron deficiency.     Works at [employer] as an . Has been very busy. Used to be a , has done lots of certification exams to become an .     Older daughter is in her 2nd year at [employer]. 11 year old will be entering the [employer] in [place] this fall.  is retired, had lymphoma in the past and is now struggling with a Schwannoma and a separate benign brain tumor.     She has a 1x2 mm brain aneurysm and is wondering if she should have reimaging. Last MRI was in 2021.     Today's PHQ-2 Score:       2023     8:00 AM   PHQ-2 (  [employer])   Q1: Little interest or pleasure in doing things 1   Q2: Feeling down, depressed or hopeless 1   PHQ-2 Score 2   Q1: Little interest or pleasure in doing things Several days   Q2: Feeling down, depressed or hopeless Several days   PHQ-2 Score 2           Social History     Tobacco Use     Smoking status: Never     Passive exposure: Never     Smokeless tobacco: Never   Vaping Use     Vaping status: Never Used   Substance Use Topics     Alcohol use: Not Currently             2023     8:00 AM   Alcohol Use   Prescreen: >3 drinks/day or >7 drinks/week? No       Breast Cancer Screenin/17/2022     9:53 PM 2023     8:01 AM   Breast CA Risk Assessment (FHS-7)   Do you have a family history of breast, colon, or ovarian cancer? Yes No / Unknown         Mammogram Screening - Offered annual screening and updated Health Maintenance for mutual plan based on risk factor consideration    Pertinent mammograms are reviewed under the imaging tab.    History of abnormal Pap smear: NO - age 30-65 PAP every 5 years with negative HPV co-testing recommended      2018    12:00 AM   PAP / HPV   HPV_EXT -  "HISTORICAL See Scanned Report       Reviewed and updated as needed this visit by clinical staff   Tobacco  Allergies  Meds              Reviewed and updated as needed this visit by Provider                     Review of Systems   Constitutional: Negative for chills and fever.   HENT: Negative for congestion, ear pain, hearing loss and sore throat.    Eyes: Negative for pain and visual disturbance.   Respiratory: Negative for cough and shortness of breath.    Cardiovascular: Negative for chest pain, palpitations and peripheral edema.   Gastrointestinal: Negative for abdominal pain, constipation, diarrhea, heartburn, hematochezia and nausea.   Breasts:  Negative for tenderness, breast mass and discharge.   Genitourinary: Negative for dysuria, frequency, genital sores, hematuria, pelvic pain, urgency, vaginal bleeding and vaginal discharge.   Musculoskeletal: Positive for arthralgias and myalgias. Negative for joint swelling.   Skin: Negative for rash.   Neurological: Negative for dizziness, weakness, headaches and paresthesias.   Psychiatric/Behavioral: Negative for mood changes. The patient is not nervous/anxious.         OBJECTIVE:   /70   Pulse 72   Temp 97.4  F (36.3  C) (Temporal)   Resp 16   Ht 1.676 m (5' 6\")   Wt 65.3 kg (144 lb)   SpO2 100%   BMI 23.24 kg/m    Physical Exam  GENERAL: healthy, alert and no distress  EYES: Eyes grossly normal to inspection, PERRL and conjunctivae and sclerae normal  HENT: ear canals and TM's normal, nose and mouth without ulcers or lesions  NECK: no adenopathy, no asymmetry, masses, or scars and thyroid normal to palpation  RESP: lungs clear to auscultation - no rales, rhonchi or wheezes  BREAST: normal without masses, tenderness or nipple discharge and no palpable axillary masses or adenopathy  CV: regular rate and rhythm, normal S1 S2, no S3 or S4, no murmur, click or rub, no peripheral edema and peripheral pulses strong  ABDOMEN: soft, nontender, no " hepatosplenomegaly, no masses and bowel sounds normal   (female): normal female external genitalia, normal urethral meatus, vaginal mucosa pink, moist, well rugated, and normal cervix/adnexa/uterus without masses or discharge. IUD strings visualized.   MS: no gross musculoskeletal defects noted, no edema  EXT: left ventral wrist with ~0.5 cm cyst, easily moveable, nontender  SKIN: no suspicious lesions or rashes and quarter-sized spot inferior to right breast that is just slightly hypopigmented.   NEURO: Normal strength and tone, mentation intact and speech normal  PSYCH: mentation appears normal, affect normal/bright    Diagnostic Test Results:  Labs reviewed in Epic    ASSESSMENT/PLAN:   Anahi was seen today for physical.    Diagnoses and all orders for this visit:    Routine general medical examination at a health care facility  -     Lipid Profile  -     Glucose  -     CBC with platelets  -     Ferritin    Cervical cancer screening  -     Pap Screen with HPV - recommended age 30 - 65 years    Bilateral carpal tunnel syndrome: explained I think this may be the cause of her wrist pain and recommended a trial of wrist bracing at nighttime and during the daytime if she can tolerate it.   -     Wrist/Arm Supplies Order Wrist Brace; Bilateral; non-thumb spica    Acne vulgaris  -     tretinoin (RETIN-A) 0.05 % external cream; Apply topically At Bedtime    Visit for screening mammogram  -     *MA Screening Digital Bilateral; Future    Chronic fatigue: will check CBC and ferritin since ferritin was on the low side of normal in the past. I suggested she could try bupropion to help with her energy level. No organic cause has been found regarding her fatigue and she has seen a neurologist and rheumatologist in the past.  She is not sure whether or not she is going to try this but she did want the prescription to be sent in.  -     buPROPion (WELLBUTRIN XL) 150 MG 24 hr tablet; Take 1 tablet (150 mg) by mouth every  morning  -     CBC with platelets  -     Ferritin    Ganglion cyst of wrist, left: discussed benign nature of this. Advised if it is growing quickly or becoming painful to let me know and we would get referral for ortho.     Skin hypopigmentation: does not appear to be a fungal infection as she had in the past. Is very subtle and we discussed it may be mild scarring from her previous infection.     Other orders  -     REVIEW OF HEALTH MAINTENANCE PROTOCOL ORDERS        Patient has been advised of split billing requirements and indicates understanding: Yes      COUNSELING:  Reviewed preventive health counseling, as reflected in patient instructions       Regular exercise       Healthy diet/nutrition        She reports that she has never smoked. She has never been exposed to tobacco smoke. She has never used smokeless tobacco.      Faith Malik MD  [facility]  Answers for HPI/ROS submitted by the patient on 4/24/2023  If you checked off any problems, how difficult have these problems made it for you to do your work, take care of things at home, or get along with other people?: Somewhat difficult  PHQ9 TOTAL SCORE: 7      DME (Durable Medical Equipment) Orders and Documentation  Orders Placed This Encounter   Procedures     Wrist/Arm Supplies Order Wrist Brace; Bilateral; non-thumb spica      The patient was assessed and it was determined the patient is in need of the following listed DME Supplies/Equipment. Please complete supporting documentation below to demonstrate medical necessity.      Wrist/Arm/Hand Bracing Supplies Documentation  Patient requires the use of the ordered bracing device due to following medical need/condition: carpal tunnel syndrome

## 2023-04-26 LAB
BKR LAB AP GYN ADEQUACY: NORMAL
BKR LAB AP GYN INTERPRETATION: NORMAL
BKR LAB AP HPV REFLEX: NORMAL
BKR LAB AP PREVIOUS ABNORMAL: NORMAL
PATH REPORT.COMMENTS IMP SPEC: NORMAL
PATH REPORT.COMMENTS IMP SPEC: NORMAL
PATH REPORT.RELEVANT HX SPEC: NORMAL

## 2023-04-27 LAB
HUMAN PAPILLOMA VIRUS 16 DNA: NEGATIVE
HUMAN PAPILLOMA VIRUS 18 DNA: NEGATIVE
HUMAN PAPILLOMA VIRUS FINAL DIAGNOSIS: NORMAL
HUMAN PAPILLOMA VIRUS OTHER HR: NEGATIVE

## 2023-08-22 ENCOUNTER — TELEPHONE (OUTPATIENT)
Dept: FAMILY MEDICINE | Facility: CLINIC | Age: 43
End: 2023-08-22
Payer: COMMERCIAL

## 2023-08-22 ENCOUNTER — TELEPHONE (OUTPATIENT)
Dept: NURSING | Facility: CLINIC | Age: 43
End: 2023-08-22
Payer: COMMERCIAL

## 2023-08-22 NOTE — TELEPHONE ENCOUNTER
New Medication Request    Contacts         Type Contact Phone/Fax    08/22/2023 04:55 PM CDT Phone (Incoming) Anahi Stockton (Self) [phone] (M)            What medication are you requesting?: Paxlovid    Reason for medication request: Positive COVID test on 08/22/2023    Have you taken this medication before?: No    Controlled Substance Agreement on file:   CSA -- Patient Level:    CSA: None found at the patient level.         Patient offered an appointment? No    Preferred Pharmacy:     [id number] [address]  AT [address] [place] [address]  Phone: [phone] Fax: [phone]      Could we send this information to you in [employer] or would you prefer to receive a phone call?:   Patient would prefer a phone call   Okay to leave a detailed message?: Yes at Home number on file [phone] (home) Pt would like a call back when Rx has been sent.

## 2023-08-22 NOTE — TELEPHONE ENCOUNTER
Patient called back.  She thought sh was going to get medication sent tonight so she can start on the antivirals.    I explained she should hear back in about 24 hours or so and they will go over health history and medications that you are on.    Patient also gave her cell phone to call.  That number is .    Patient had 2 other questions:  Can she get a note for work?  She has fibromyalgia and usually after viral infections it flares up.  Is there anything she needs to take for that?    Explained quarantine, protecting other, masking and testing other family members.    Patient will await a call from the clinic.    Carmina Young RN   08/22/23 6:16 PM  [facility] Nurse Advisor

## 2023-08-23 ENCOUNTER — NURSE TRIAGE (OUTPATIENT)
Dept: NURSING | Facility: CLINIC | Age: 43
End: 2023-08-23

## 2023-08-23 ENCOUNTER — VIRTUAL VISIT (OUTPATIENT)
Dept: FAMILY MEDICINE | Facility: CLINIC | Age: 43
End: 2023-08-23
Payer: COMMERCIAL

## 2023-08-23 DIAGNOSIS — U07.1 INFECTION DUE TO 2019 NOVEL CORONAVIRUS: Primary | ICD-10-CM

## 2023-08-23 PROCEDURE — 99213 OFFICE O/P EST LOW 20 MIN: CPT | Mod: VID | Performed by: INTERNAL MEDICINE

## 2023-08-23 ASSESSMENT — PATIENT HEALTH QUESTIONNAIRE - PHQ9
SUM OF ALL RESPONSES TO PHQ QUESTIONS 1-9: 0
SUM OF ALL RESPONSES TO PHQ QUESTIONS 1-9: 0

## 2023-08-23 NOTE — PROGRESS NOTES
Anahi is a 43 year old who is being evaluated via a billable video visit.      How would you like to obtain your AVS? MyChart  If the video visit is dropped, the invitation should be resent by: Text to cell phone: [phone]  Will anyone else be joining your video visit? No          Assessment & Plan     Infection due to 2019 novel coronavirus  She tested positive for COVID yesterday  Her  also has COVID  She is having some fever  Body aches  Also having some sore throat  She has fibromyalgia  She is concerned that the COVID is not going to make her weaker  She wants to start on Paxlovid as soon as possible  We discussed the side effects of Paxlovid including bitter taste/nausea/diarrhea/dizziness  Also discussed about the risk of the rare skin reaction that can happen  Discussed about rebound COVID  Discussed about quarantine measures  Work note given  - nirmatrelvir and ritonavir (PAXLOVID) 300 mg/100 mg therapy pack; Take 3 tablets by mouth 2 times daily for 5 days (Take 2 Nirmatrelvir tablets and 1 Ritonavir tablet twice daily for 5 days)      20 minutes spent by me on the date of the encounter doing chart review, history and exam, documentation and further activities per the note           Jose Rodrigues MD  [facility]   Anahi is a 43 year old, presenting for the following health issues:  Covid Concern      8/23/2023    10:54 AM   Additional Questions   Roomed by Sherry       History of Present Illness       Reason for visit:  COVID POSITIVE  Symptom onset:  1-3 days ago  Symptoms include:  Cough, sore throat, runny nose, nausea, body aches, upset stomach  Symptom intensity:  Moderate  Symptom progression:  Staying the same  What makes it worse:  Getting up  What makes it better:  Resting    She eats 2-3 servings of fruits and vegetables daily.She consumes 0 sweetened beverage(s) daily.She exercises with enough effort to increase her heart rate 30 to 60 minutes per  day.  She exercises with enough effort to increase her heart rate 3 or less days per week.   She is taking medications regularly.               Review of Systems   Constitutional, HEENT, cardiovascular, pulmonary, gi and gu systems are negative, except as otherwise noted.      Objective           Vitals:  No vitals were obtained today due to virtual visit.    Physical Exam   GENERAL: Healthy, alert and no distress  EYES: Eyes grossly normal to inspection.  No discharge or erythema, or obvious scleral/conjunctival abnormalities.  RESP: No audible wheeze, cough, or visible cyanosis.  No visible retractions or increased work of breathing.    SKIN: Visible skin clear. No significant rash, abnormal pigmentation or lesions.  NEURO: Cranial nerves grossly intact.  Mentation and speech appropriate for age.  PSYCH: Mentation appears normal, affect normal/bright, judgement and insight intact, normal speech and appearance well-groomed.                Video-Visit Details    Type of service:  Video Visit     Originating Location (pt. Location): Home    Distant Location (provider location):  Off-site  Platform used for Video Visit: [employer]

## 2023-08-23 NOTE — TELEPHONE ENCOUNTER
Nurse Triage SBAR    Is this a 2nd Level Triage? NO    Situation: Positive COVID    Background: Pt called the clinic yesterday requesting Paxlovid and a note to get out of work. She tested positive for COVID 8/22/23.  tested positive 8/21/23. Pt was told she would get a call back in the next 24 hrs. She had not heard back yet so she called back.    Assessment: Pt tested postive for COVID at home on 8/22/23. Experiencing a cough, sore throat, body aches, and nausea. Pt states she's been afebrile. Symptoms started yesterday.     Protocol Recommended Disposition:   Home Care    Recommendation: Home care. Pt transferred to the COVID antiviral line to set up a virtual visit, due to her not qualifying for Paxlovid through our process.Protocol and care advice reviewed. Advised to call back with any new or worsening signs, symptoms, concerns, or questions. They verbalized understanding and agreed to follow advice given.       Reason for Disposition   [1] COVID-19 diagnosed by positive lab test (e.g., PCR, rapid self-test kit) AND [2] NO symptoms (e.g., cough, fever, others)    Additional Information   Negative: SEVERE difficulty breathing (e.g., struggling for each breath, speaks in single words)   Negative: Difficult to awaken or acting confused (e.g., disoriented, slurred speech)   Negative: Bluish (or gray) lips or face now   Negative: Shock suspected (e.g., cold/pale/clammy skin, too weak to stand, low BP, rapid pulse)   Negative: Sounds like a life-threatening emergency to the triager   Negative: [1] Diagnosed or suspected COVID-19 AND [2] symptoms lasting 3 or more weeks   Negative: [1] COVID-19 exposure AND [2] no symptoms   Negative: COVID-19 vaccine reaction suspected (e.g., fever, headache, muscle aches) occurring 1 to 3 days after getting vaccine   Negative: COVID-19 vaccine, questions about   Negative: [1] Lives with someone known to have influenza (flu test positive) AND [2] flu-like symptoms (e.g.,  cough, runny nose, sore throat, SOB; with or without fever)   Negative: [1] Adult with possible COVID-19 symptoms AND [2] triager concerned about severity of symptoms or other causes   Negative: COVID-19 and breastfeeding, questions about   Negative: SEVERE or constant chest pain or pressure  (Exception: Mild central chest pain, present only when coughing.)   Negative: MODERATE difficulty breathing (e.g., speaks in phrases, SOB even at rest, pulse 100-120)   Negative: Headache and stiff neck (can't touch chin to chest)   Negative: Oxygen level (e.g., pulse oximetry) 90 percent or lower   Negative: Chest pain or pressure  (Exception: MILD central chest pain, present only when coughing)   Negative: Patient sounds very sick or weak to the triager   Negative: MILD difficulty breathing (e.g., minimal/no SOB at rest, SOB with walking, pulse <100)   Negative: Fever > 103 F (39.4 C)   Negative: [1] Fever > 101 F (38.3 C) AND [2] over 60 years of age   Negative: [1] Fever > 100.0 F (37.8 C) AND [2] bedridden (e.g., nursing home patient, CVA, chronic illness, recovering from surgery)   Negative: [1] HIGH RISK for severe COVID complications (e.g., weak immune system, age > 64 years, obesity with BMI of 30 or higher, pregnant, chronic lung disease or other chronic medical condition) AND [2] COVID symptoms (e.g., cough, fever)  (Exceptions: Already seen by PCP and no new or worsening symptoms.)   Negative: [1] HIGH RISK patient AND [2] influenza exposure within the last 7 days AND [3] ONE OR MORE respiratory symptoms: cough, sore throat, runny or stuffy nose   Negative: [1] HIGH RISK patient AND [2] influenza is widespread in the community AND [3] ONE OR MORE respiratory symptoms: cough, sore throat, runny or stuffy nose   Negative: Oxygen level (e.g., pulse oximetry) 91 to 94 percent   Negative: [1] COVID-19 infection suspected by caller or triager AND [2] mild symptoms (cough, fever, or others) AND [3] negative COVID-19 rapid  test   Negative: Fever present > 3 days (72 hours)   Negative: [1] Fever returns after gone for over 24 hours AND [2] symptoms worse or not improved   Negative: [1] Continuous (nonstop) coughing interferes with work or school AND [2] no improvement using cough treatment per Care Advice   Negative: Cough present > 3 weeks    Protocols used: Coronavirus (COVID-19) Diagnosed or [id number]    Marychuy Christianson RN on 8/23/2023 at 10:54 AM

## 2023-08-23 NOTE — LETTER
August 23, 2023      Anahi Stockton  [address] [address]        To Whom It May Concern:    Anahi Stockton was seen in our clinic.  She is being treated for Cvoid   She may return to work with the following:  on 8/28/2023 wearing a mask for 5 days      Sincerely,        Jose Rodrigues MD

## 2023-09-14 ENCOUNTER — HOSPITAL ENCOUNTER (OUTPATIENT)
Dept: MAMMOGRAPHY | Facility: CLINIC | Age: 43
Discharge: HOME | End: 2023-09-14
Attending: FAMILY MEDICINE
Payer: COMMERCIAL

## 2023-09-14 ENCOUNTER — HOSPITAL ENCOUNTER (OUTPATIENT)
Dept: MRI IMAGING | Facility: CLINIC | Age: 43
Discharge: HOME | End: 2023-09-14
Attending: FAMILY MEDICINE
Payer: COMMERCIAL

## 2023-09-14 DIAGNOSIS — I67.1 BRAIN ANEURYSM: ICD-10-CM

## 2023-09-14 DIAGNOSIS — Z12.31 VISIT FOR SCREENING MAMMOGRAM: ICD-10-CM

## 2023-09-14 PROCEDURE — 77067 SCR MAMMO BI INCL CAD: CPT

## 2023-09-14 PROCEDURE — 70544 MR ANGIOGRAPHY HEAD W/O DYE: CPT

## 2023-09-30 ENCOUNTER — IMMUNIZATION (OUTPATIENT)
Dept: FAMILY MEDICINE | Facility: CLINIC | Age: 43
End: 2023-09-30
Payer: COMMERCIAL

## 2023-09-30 PROCEDURE — 90471 IMMUNIZATION ADMIN: CPT

## 2023-09-30 PROCEDURE — 90686 IIV4 VACC NO PRSV 0.5 ML IM: CPT

## 2023-11-02 NOTE — PROGRESS NOTES
Mental Health Visit Note    Patient: Anahi Stockton    : 1980 MRN: [id number]    2020    Start time: 3:00pm    Stop Time: 3:50pm   Session # 11    Session Type: Patient is presenting for an Individual session.    Anahi Stockton is a 40 y.o. female is being seen today for    Chief Complaint   Patient presents with     MH Follow Up     Psychotherapy follow-up visit for anxiety and depression   .     Telemedicine Visit: The patient's condition can be safely assessed and treated via synchronous audio and visual telemedicine encounter.      Reason for Telemedicine Visit: Services only offered telehealth    Originating Site (Patient Location): Patient's home    Distant Site (Provider Location): Provider Remote Setting    Consent:  The patient/guardian has verbally consented to: the potential risks and benefits of telemedicine (video visit) versus in person care; bill my insurance or make self-payment for services provided; and responsibility for payment of non-covered services.     Mode of Communication:  Video Conference via [employer]     As the provider I attest to compliance with applicable laws and regulations related to telemedicine.    Those present for this visit include patient and therapist    Follow up in regards to ongoing symptom management of anxiety and depression    New symptoms or complaints: None    Functional Impairment:   Personal: 4  Family: 3  Social: 2  Work: 4    Clinical assessment of mental status:   Anahi Stockton presented on time.   She was oriented x3, open and cooperative, and dressed appropriately for this session and weather. Her memory was Normal cognitive functioning .  Her speech was  Within normal.  Language was congruent with speech.  Concentration and focus is Within normal. Psychosis is not noted or reported. She reports her mood is Anxious and Depressed.  Affect is congruent with speech and is Congruent w/content of speech.  Fund of knowledge is adequate. Insight is  "adequate for therapy.    Suicidal/Homicidal Ideation present: Patient denies suicidal and homicidal ideations/means or plans.     Patient's impression of their current status:   Patient reports she is \"trying to be stronger and just get through things.\" She is really trying to navigate feelings of guilt about the past.    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 She is trying to make the best/informed decision for herself and her family. She is leaning towards a hybrid model. She's also trying to learn to let go of things outside of her control.   She shares that \"the more I do the thought re-framing\" the better she feels - the combination of the medication intervention also assisting in reducing physical symptoms.  Although, she has noticed how smaller lifestyle choices have impacted her energy levels and physical strength. She wants to start taking better care of her physical health with light exercise and diet.   She doesn't feel ready to return to work yet but she would be more comfortable with accommodations upon return.   She wants to build her strength back and give her medication more time to work. She has a psychiatry appt at the end of the month and an appt at the pain clinic in October at which time she will determine future plans to return to work.     Therapist impression of patients current state:   The therapist prompted patient to express thoughts and feelings following a CBT framework. Therapist provided unconditional positive regard and support, normalizing and validating her experiences.   She is really gaining strong insight into the cognitive process - the more cognitive/introspective work she does, the better she starts to feel.   She was affirmed for her capacity to be vulnerable and brave.     Type of psychotherapeutic technique provided: Client centered and CBT    Progress toward short term goals:Progress as expected, with patient reflecting " "upon the impact of life events while building trust in the therapeutic process. Patient appeared very open to following therapist recommendations. She is working on therapy skills outside of session such as thought re-framing skills:    \"I am someone who has always been dedicated to doing what's best for my family\" - using this statement to help challenge feelings of guilt and change.     Review of long term goals:   Not done at today's visit and Date of last review 7/13/2020     *process sacrifices in relationship      *long-term goal is to return to work     Clinical Global Impressions  Most recent:  Considering your total clinical experience with this particular patient population, how severe are the patient's symptoms at this time?: 4 - Moderately ill  Compared to the patient's condition at the START of treatment, this patient's condition is:: 3 - Minimally improved      Diagnosis:   1. Adjustment disorder with mixed anxiety and depressed mood    no change  R/O Generalized Anxiety Disorder   R/O Major Depressive Disorder   R/O PTSD    Plan and Follow-up:   Therapist will provide resources about the nature of somatic complaints related to difficult life events. Therapist will encourage patient to participate in grief work using narrative therapy techniques. Therapist will coordinate care with patient's psychiatrist. Patient agrees to continue taking medication as prescribed.   Therapist recommended following book for continued education: \"Minding the Body, Mending the Mind\" by Lauren Sanchez - patient purchased this as an audiobook and was recommended to continue reading as therapy homework     HW: practice radical self-acceptance  *HW look up modified yoga for pain and/or chair yoga  *HW also to continue practicing deep breathing techniques  *HW practice positive self-talk - (demonstrate same levels of compassion for self as she does for others)  *HW practice thought re-framing   Ie: \"Just get over it\" to \"it's " "okay to take a break\"  Ie: \"I can't deal with this\" to \"I'm doing the best that I can\"   Ie: \"my best is not good enough\" to \"I always try my best and I trust that my best is good enough\"     *plan to practice deep breathing strategies at the end of scheduled visit for consistent practice and guidance        Discharge Criteria/Planning: Patient will continue with follow-up until therapy can be discontinued without return of signs and symptoms.    I have reviewed the note as documented above.  This accurately captures the substance of my conversation with the patient.    As the provider I attest to compliance with applicable laws and regulations related to telemedicine.  Performed and documented by KI Josue 9/14/2020  " Albendazole Pregnancy And Lactation Text: This medication is Pregnancy Category C and it isn't known if it is safe during pregnancy. It is also excreted in breast milk.

## 2023-11-30 ENCOUNTER — IMMUNIZATION (OUTPATIENT)
Dept: NURSING | Facility: CLINIC | Age: 43
End: 2023-11-30
Payer: COMMERCIAL

## 2023-11-30 PROCEDURE — 90480 ADMN SARSCOV2 VAC 1/ONLY CMP: CPT

## 2023-11-30 PROCEDURE — 91320 SARSCV2 VAC 30MCG TRS-SUC IM: CPT

## 2024-02-07 ENCOUNTER — TELEPHONE (OUTPATIENT)
Dept: FAMILY MEDICINE | Facility: CLINIC | Age: 44
End: 2024-02-07
Payer: COMMERCIAL

## 2024-02-07 ENCOUNTER — MYC MEDICAL ADVICE (OUTPATIENT)
Dept: FAMILY MEDICINE | Facility: CLINIC | Age: 44
End: 2024-02-07
Payer: COMMERCIAL

## 2024-02-07 NOTE — TELEPHONE ENCOUNTER
Reason for Call:  Other call back    Detailed comments: pt wanting to touch base with Dr. Malik regarding reducing work hours from 40 hours to 30 hours due to her medical issues. Pt is wondering if she can get a letter?  Pt wanted virtal visit with provider but pcp booking out till end of march.   Is this something pcp can give to be for her work- or can pt get in for virtal visit? Please contact pt    Phone Number Patient can be reached at: Cell number on file:    Telephone Information:   Mobile [phone]       Best Time: any    Can we leave a detailed message on this number? YES    Call taken on 2/7/2024 at 1:20 PM by Vannesa Bermudez

## 2024-02-08 NOTE — TELEPHONE ENCOUNTER
I would be happy to write the letter but I'm not able to fit her in sooner than for her scheduled appt on March 4th. Happy to do that appointment either in person or virtual. Thank you.     Faith Malik MD

## 2024-03-03 ASSESSMENT — ANXIETY QUESTIONNAIRES
1. FEELING NERVOUS, ANXIOUS, OR ON EDGE: NEARLY EVERY DAY
GAD7 TOTAL SCORE: 21
6. BECOMING EASILY ANNOYED OR IRRITABLE: NEARLY EVERY DAY
GAD7 TOTAL SCORE: 21
8. IF YOU CHECKED OFF ANY PROBLEMS, HOW DIFFICULT HAVE THESE MADE IT FOR YOU TO DO YOUR WORK, TAKE CARE OF THINGS AT HOME, OR GET ALONG WITH OTHER PEOPLE?: EXTREMELY DIFFICULT
GAD7 TOTAL SCORE: 21
3. WORRYING TOO MUCH ABOUT DIFFERENT THINGS: NEARLY EVERY DAY
7. FEELING AFRAID AS IF SOMETHING AWFUL MIGHT HAPPEN: NEARLY EVERY DAY
5. BEING SO RESTLESS THAT IT IS HARD TO SIT STILL: NEARLY EVERY DAY
IF YOU CHECKED OFF ANY PROBLEMS ON THIS QUESTIONNAIRE, HOW DIFFICULT HAVE THESE PROBLEMS MADE IT FOR YOU TO DO YOUR WORK, TAKE CARE OF THINGS AT HOME, OR GET ALONG WITH OTHER PEOPLE: EXTREMELY DIFFICULT
7. FEELING AFRAID AS IF SOMETHING AWFUL MIGHT HAPPEN: NEARLY EVERY DAY
2. NOT BEING ABLE TO STOP OR CONTROL WORRYING: NEARLY EVERY DAY
4. TROUBLE RELAXING: NEARLY EVERY DAY

## 2024-03-03 ASSESSMENT — PATIENT HEALTH QUESTIONNAIRE - PHQ9
SUM OF ALL RESPONSES TO PHQ QUESTIONS 1-9: 19
SUM OF ALL RESPONSES TO PHQ QUESTIONS 1-9: 19
10. IF YOU CHECKED OFF ANY PROBLEMS, HOW DIFFICULT HAVE THESE PROBLEMS MADE IT FOR YOU TO DO YOUR WORK, TAKE CARE OF THINGS AT HOME, OR GET ALONG WITH OTHER PEOPLE: EXTREMELY DIFFICULT

## 2024-03-04 ENCOUNTER — OFFICE VISIT (OUTPATIENT)
Dept: FAMILY MEDICINE | Facility: CLINIC | Age: 44
End: 2024-03-04
Payer: COMMERCIAL

## 2024-03-04 VITALS
DIASTOLIC BLOOD PRESSURE: 74 MMHG | OXYGEN SATURATION: 100 % | HEIGHT: 66 IN | BODY MASS INDEX: 22.94 KG/M2 | RESPIRATION RATE: 16 BRPM | TEMPERATURE: 98 F | HEART RATE: 67 BPM | WEIGHT: 142.75 LBS | SYSTOLIC BLOOD PRESSURE: 118 MMHG

## 2024-03-04 DIAGNOSIS — F43.22 ADJUSTMENT DISORDER WITH ANXIOUS MOOD: Primary | ICD-10-CM

## 2024-03-04 PROCEDURE — 99214 OFFICE O/P EST MOD 30 MIN: CPT | Performed by: FAMILY MEDICINE

## 2024-03-04 RX ORDER — ESCITALOPRAM OXALATE 5 MG/1
5 TABLET ORAL DAILY
Qty: 60 TABLET | Refills: 1 | Status: SHIPPED | OUTPATIENT
Start: 2024-03-04 | End: 2024-05-28

## 2024-03-04 ASSESSMENT — PATIENT HEALTH QUESTIONNAIRE - PHQ9: SUM OF ALL RESPONSES TO PHQ QUESTIONS 1-9: 19

## 2024-03-04 NOTE — PATIENT INSTRUCTIONS
Start with 5 mg per day of Lexapro (escitalopram). After 2 weeks, if you feel a little better, continue it. If you notice no difference, increase to 10 mg per day.     Lexapro is an SSRI, which stands for Selective Serotonin Reuptake Inhibitor. This type of medication helps with both depression and anxiety.     It usually takes 4-6 weeks before you will start to notice the full effect in your mood from this medication. You might feel a little better after 2 weeks. Sometimes, if there is no improvement after 4-6 weeks, we need to increase the dose.     The side effects of this medication usually get better within the first week or two of using them. Side effects include:    -nausea or diarrhea  -sleep interruption (I recommend taking it in the morning for this reason)  -decreased interest in sex

## 2024-03-04 NOTE — PROGRESS NOTES
"  Assessment & Plan     Adjustment disorder with anxious mood: Completed FMLA form stating she can reduce her work hours to 30/week.  Place mental health referral so she can restart therapy.  Also will try escitalopram at 5 mg/day.  Encouraged her to try this for 2 weeks and if she does not notice any difference in her mood, increase to 10 mg/day.  I will see her back in about 6 weeks.  - escitalopram (LEXAPRO) 5 MG tablet  Dispense: 60 tablet; Refill: 1  - Adult Mental Health  Referral      Depression Screening Follow Up        3/4/2024     8:36 AM   PHQ   PHQ-9 Total Score 19   Q9: Thoughts of better off dead/self-harm past 2 weeks Not at all         Follow Up Actions Taken  Crisis resource information provided in After Visit Summary  Mental Health Referral placed           Sanjuana Christian is a 43 year old, presenting for the following health issues:  Follow Up      3/4/2024     8:35 AM   Additional Questions   Roomed by Hiral Molina to redirect to the Timeline version of the [employer] SmartLink.  History of Present Illness       Mental Health Follow-up:  Patient presents to follow-up on Depression & Anxiety.Patient's depression since last visit has been:  Worse  The patient is having other symptoms associated with depression.  Patient's anxiety since last visit has been:  Worse  The patient is having other symptoms associated with anxiety.  Any significant life events: job concerns, financial concerns, health concerns and other  Patient is feeling anxious or having panic attacks.  Patient has no concerns about alcohol or drug use.    Headaches:   Since the patient's last clinic visit, headaches are: worsened  The patient is getting headaches:  2-3 times weekly  She is not able to do normal daily activities when she has a migraine.  The patient is taking the following rescue/relief medications:  Ibuprofen (Advil, Motrin) and Tylenol   Patient states \"I get some relief\" from the rescue/relief " "medications.   The patient is taking the following medications to prevent migraines:  No medications to prevent migraines  In the past 4 weeks, the patient has gone to an Urgent Care or Emergency Room 0 times times due to headaches.    Reason for visit:  Anxiety, fatigue and fibromyalgia    She eats 2-3 servings of fruits and vegetables daily.She consumes 1 sweetened beverage(s) daily.She exercises with enough effort to increase her heart rate 9 or less minutes per day.  She exercises with enough effort to increase her heart rate 3 or less days per week.   She is taking medications regularly.     Anahi has been struggling considerably with fatigue, anxiety, fibromyalgia (body aches and tightness) since  December. Her  has an acoustic neuroma and will need surgery for it later this year. She is having insomnia also. Will fall asleep around 8 PM while watching TV in bed and then wakes up at 2 and can't fall back asleep.      She is planning to cut down her work hours to 30/week.  She needs to have an FMLA form signed today.    She would like to restart therapy with her previous therapist Carolyne Figueredo.    Gabapentin helped with pain and fibromyalgia but she stopped it because she didn't like how she felt on it. When something good happened, she felt somewhat numb. Was also on sertraline for a period of time. But was concerned it may be causing weight gain and she also stopped it at a time when she felt like her anxiety was better.    Cymbalta- was very nauseous on this.           Objective    /74   Pulse 67   Temp 98  F (36.7  C) (Oral)   Resp 16   Ht 1.676 m (5' 6\")   Wt 64.8 kg (142 lb 12 oz)   LMP 02/28/2024 (Approximate)   SpO2 100%   BMI 23.04 kg/m    Body mass index is 23.04 kg/m .  Physical Exam   GENERAL: alert and no distress  MS: no gross musculoskeletal defects noted, no edema  SKIN: no suspicious lesions or rashes  NEURO: Normal strength and tone, mentation intact and speech " normal  PSYCH: mentation appears normal, affect normal/bright          Signed Electronically by: Faith Malik MD

## 2024-04-01 ASSESSMENT — ANXIETY QUESTIONNAIRES
2. NOT BEING ABLE TO STOP OR CONTROL WORRYING: NEARLY EVERY DAY
8. IF YOU CHECKED OFF ANY PROBLEMS, HOW DIFFICULT HAVE THESE MADE IT FOR YOU TO DO YOUR WORK, TAKE CARE OF THINGS AT HOME, OR GET ALONG WITH OTHER PEOPLE?: VERY DIFFICULT
6. BECOMING EASILY ANNOYED OR IRRITABLE: MORE THAN HALF THE DAYS
1. FEELING NERVOUS, ANXIOUS, OR ON EDGE: NEARLY EVERY DAY
4. TROUBLE RELAXING: MORE THAN HALF THE DAYS
GAD7 TOTAL SCORE: 16
7. FEELING AFRAID AS IF SOMETHING AWFUL MIGHT HAPPEN: NEARLY EVERY DAY
5. BEING SO RESTLESS THAT IT IS HARD TO SIT STILL: NOT AT ALL
7. FEELING AFRAID AS IF SOMETHING AWFUL MIGHT HAPPEN: NEARLY EVERY DAY
3. WORRYING TOO MUCH ABOUT DIFFERENT THINGS: NEARLY EVERY DAY
IF YOU CHECKED OFF ANY PROBLEMS ON THIS QUESTIONNAIRE, HOW DIFFICULT HAVE THESE PROBLEMS MADE IT FOR YOU TO DO YOUR WORK, TAKE CARE OF THINGS AT HOME, OR GET ALONG WITH OTHER PEOPLE: VERY DIFFICULT

## 2024-04-04 ENCOUNTER — VIRTUAL VISIT (OUTPATIENT)
Dept: BEHAVIORAL HEALTH | Facility: CLINIC | Age: 44
End: 2024-04-04
Payer: COMMERCIAL

## 2024-04-04 DIAGNOSIS — F41.1 GENERALIZED ANXIETY DISORDER: Primary | ICD-10-CM

## 2024-04-04 PROCEDURE — 90791 PSYCH DIAGNOSTIC EVALUATION: CPT | Mod: 95 | Performed by: SOCIAL WORKER

## 2024-04-04 ASSESSMENT — ANXIETY QUESTIONNAIRES
GAD7 TOTAL SCORE: 6
5. BEING SO RESTLESS THAT IT IS HARD TO SIT STILL: NOT AT ALL
6. BECOMING EASILY ANNOYED OR IRRITABLE: SEVERAL DAYS
1. FEELING NERVOUS, ANXIOUS, OR ON EDGE: SEVERAL DAYS
8. IF YOU CHECKED OFF ANY PROBLEMS, HOW DIFFICULT HAVE THESE MADE IT FOR YOU TO DO YOUR WORK, TAKE CARE OF THINGS AT HOME, OR GET ALONG WITH OTHER PEOPLE?: SOMEWHAT DIFFICULT
3. WORRYING TOO MUCH ABOUT DIFFERENT THINGS: SEVERAL DAYS
GAD7 TOTAL SCORE: 6
4. TROUBLE RELAXING: SEVERAL DAYS
GAD7 TOTAL SCORE: 6
7. FEELING AFRAID AS IF SOMETHING AWFUL MIGHT HAPPEN: SEVERAL DAYS
2. NOT BEING ABLE TO STOP OR CONTROL WORRYING: SEVERAL DAYS
7. FEELING AFRAID AS IF SOMETHING AWFUL MIGHT HAPPEN: SEVERAL DAYS

## 2024-04-04 ASSESSMENT — PATIENT HEALTH QUESTIONNAIRE - PHQ9
10. IF YOU CHECKED OFF ANY PROBLEMS, HOW DIFFICULT HAVE THESE PROBLEMS MADE IT FOR YOU TO DO YOUR WORK, TAKE CARE OF THINGS AT HOME, OR GET ALONG WITH OTHER PEOPLE: SOMEWHAT DIFFICULT
10. IF YOU CHECKED OFF ANY PROBLEMS, HOW DIFFICULT HAVE THESE PROBLEMS MADE IT FOR YOU TO DO YOUR WORK, TAKE CARE OF THINGS AT HOME, OR GET ALONG WITH OTHER PEOPLE: SOMEWHAT DIFFICULT
SUM OF ALL RESPONSES TO PHQ QUESTIONS 1-9: 7
SUM OF ALL RESPONSES TO PHQ QUESTIONS 1-9: 7
9. THOUGHTS THAT YOU WOULD BE BETTER OFF DEAD, OR OF HURTING YOURSELF: NOT AT ALL
7. TROUBLE CONCENTRATING ON THINGS, SUCH AS READING THE NEWSPAPER OR WATCHING TELEVISION: SEVERAL DAYS
1. LITTLE INTEREST OR PLEASURE IN DOING THINGS: SEVERAL DAYS
3. TROUBLE FALLING OR STAYING ASLEEP OR SLEEPING TOO MUCH: SEVERAL DAYS
4. FEELING TIRED OR HAVING LITTLE ENERGY: SEVERAL DAYS
2. FEELING DOWN, DEPRESSED, IRRITABLE, OR HOPELESS: SEVERAL DAYS
SUM OF ALL RESPONSES TO PHQ QUESTIONS 1-9: 7
5. POOR APPETITE OR OVEREATING: SEVERAL DAYS

## 2024-04-04 ASSESSMENT — COLUMBIA-SUICIDE SEVERITY RATING SCALE - C-SSRS
2. HAVE YOU ACTUALLY HAD ANY THOUGHTS OF KILLING YOURSELF?: NO
6. IN YOUR LIFETIME, HAVE YOU EVER DONE ANYTHING, STARTED TO DO ANYTHING, OR PREPARED TO DO ANYTHING TO END YOUR LIFE?: NO
1. IN THE PAST MONTH, HAVE YOU WISHED YOU WERE DEAD OR WISHED YOU COULD GO TO SLEEP AND NOT WAKE UP?: NO

## 2024-04-04 NOTE — PROGRESS NOTES
"[facility]     PATIENT'S NAME: Anahi Stockton  PREFERRED NAME: Anahi  PRONOUNS:     she/her  MRN: [id number]  : 1980  ADDRESS: [address]  [place] [address]  [facility]  [id number]  DATE OF SERVICE: 24  START TIME: 11:00am  END TIME: 11:55am  PREFERRED PHONE: [phone]  May we leave a program related message: Yes  EMERGENCY CONTACT: was not obtained  .  SERVICE MODALITY:  Video Visit:      Provider verified identity through the following two step process.  Patient provided:  Patient is known previously to provider    Telemedicine Visit: The patient's condition can be safely assessed and treated via synchronous audio and visual telemedicine encounter.      Reason for Telemedicine Visit: Patient has requested telehealth visit    Originating Site (Patient Location): Patient's home    Distant Site (Provider Location): Provider Remote Setting- Home Office    Consent:  The patient/guardian has verbally consented to: the potential risks and benefits of telemedicine (video visit) versus in person care; bill my insurance or make self-payment for services provided; and responsibility for payment of non-covered services.     Patient would like the video invitation sent by:  My Chart    Mode of Communication:  Video Conference via [facility]    Distant Location (Provider):  Off-site    As the provider I attest to compliance with applicable laws and regulations related to telemedicine.    UNIVERSAL ADULT Mental Health DIAGNOSTIC ASSESSMENT    Identifying Information:  Patient is a 43 year old,  Monegasque  other individual.  [facility]  Patient attended the session alone.    Chief Complaint:   The reason for seeking services at this time is: \"Anxiety\".  The problem(s) began 23.    Patient has attempted to resolve these concerns in the past through therapy .Participated in therapy with this provider from 2020 to " "February 2021.   She reports learning how to manage physical symptoms of fibromyalgia and anxiety associated with this.   was diagnosed with a benign brain tumor one year ago and then in December '23 they instructed her  to go through treatment. Patient states, \"It's been an emotional roller coaster since that time.\" He is scheduled to have surgery next week (4/12) at [facility]. She is seeking therapy at this time in order to manage emotions associated with this major life event. She is trying to make sense of \"why\" this is happening - \"we've already been through so much and I feel upset and angry.\" She worries about her kids and how they will handle this. She does think of worst-case-scenarios and notes some sleep disturbances. She notes being critical of herself and sometimes believing she isn't being strong enough. \"I feel mad at myself that I don't know who I would be without my .\" Wants help to manage feelings about the feelings. She does notice a strong correlation between physical and mental health symptoms. Patient is struggling to maintain a good sleep cycle and healthy habits - often doesn't want to do much on a hard time.     History of Presenting Concern:  The problem(s) began in December 2019. This is when she had to take time off from work. Patient has attempted to resolve these concerns by attending various doctor appointments. Patient reports that other professional(s) are involved in providing support / services. PCP, neurologist, rheumatologist, psychiatrist. Patient reports, \"I have had many medical visits and everything has come back normal but I am still feeling the same symptoms. It's been a nightmare. The fact that there is no medical diagnosis is driving me even more crazier.\"   Patient experienced anxiety symptoms years ago when her  was diagnosed with cancer (approx 15 years ago). Her  has been through chemotherapy treatments on 6 different occasions and " "is now currently in remission. She recalls being very anxious and depressed in the past due to this stress. Historically, depression symptoms were secondary to worsening physical health and anxiety symptoms.    Social/Family History:  Patient reported she grew up in [place].  They were raised by  biological parents. They were one of 4 children with 3 brothers. The patient's mother is currently residing in [place] with patient's brother. Her father is . Patient reports \"Grew up in a family that really valued education. Mom was a  in a school. Dad worked for the government.   Believes she had some anxiety as a teenager but mental health was not something they talked about or knew about in [place].\"    The patient describes their cultural background as [place] with Jewish sienna.  Cultural influences and impact on patient's life structure, values, norms, and healthcare: Belief system - grew up Jewish - family is very progressive and open-minded - I don t know if I can identify with a certain Restorationism right now -  also raised Jewish - more spiritual   . The patient reports there  adherence to the family unit , strong work ethic  and achievement   Patient identified her preferred language to be English. Patient reported she does not need the assistance of an  or other support involved in therapy. Modifications will not be used to assist communication in therapy.     Patient reported had no significant delays in developmental tasks.   Patient's highest education level was college graduate  .  Patient identified the following learning problems: none reported.  Modifications will not be used to assist communication in therapy.  Patient reports they are  able to understand written materials.    Patient reported the following relationship history .  Patient's current relationship status is  for over 20 years.  They met in [place] and she moved to the [place] at age 21. She recalls that it was " hard to find work and hard to make friends. 1.5 years after she moved here, her  started chemotherapy. Her daughter was only 1 week old. Recalls being in  survival mode  and not really thinking much about herself - about 1 year later she participated in some therapy due to this stressful time  Patient identified their sexual orientation as heterosexual.  Patient reported having 2 child(bentley). Patient identified partner; friends as part of their support system.  Patient identified the quality of these relationships as good,  .      Patient's current living/housing situation involves staying in own home/apartment.  The immediate members of family and household include Aislinn Riley, 57,  and they report that housing is stable.    Patient is currently employed fulltime.  Work is going well and got a promotion last year. Patient reports their finances are obtained through employment. Patient does not identify finances as a current stressor.      Patient reported that they have not been involved with the legal system.   . Patient does not report being under probation/ parole/ jurisdiction. They are not under any current court jurisdiction. .    Patient's Strengths and Limitations:  Patient identified the following strengths or resources that will help them succeed in treatment: commitment to health and well being, exercise routine, sienna / spirituality, insight, intelligence, positive work environment, motivation, and work ethic. Things that may interfere with the patient's success in treatment include: lack of family support and physical health concerns.     Assessments:  The following assessments were completed by patient for this visit:  PHQ9:       2/16/2021     1:00 PM 2/23/2021    11:00 AM 4/24/2023     7:57 AM 8/23/2023    10:56 AM 3/3/2024     9:51 AM 3/4/2024     8:36 AM 4/4/2024     8:43 AM   PHQ-9 SCORE   PHQ-9 Total Score MyChart   7 (Mild depression) 0 19 (Moderately severe depression)  7 (Mild  depression)   PHQ-9 Total Score 10 8 7 0 19 19 7     GAD7:       11/6/2020    11:00 AM 12/29/2020     3:00 PM 2/16/2021     1:00 PM 2/23/2021    11:00 AM 3/3/2024     9:56 AM 4/1/2024     9:34 AM 4/4/2024     8:43 AM   MING-7 SCORE   Total Score     21 (severe anxiety) 16 (severe anxiety) 6 (mild anxiety)   Total Score 20 17 7 4 21 16 6     CAGE-AID:       4/4/2024    10:59 AM   CAGE-AID Total Score   Total Score 0     PROMIS 10-Global Health (only subscores and total score):       4/4/2024     8:39 AM   PROMIS-10 Scores Only   Global Mental Health Score 13   Global Physical Health Score 12   PROMIS TOTAL - SUBSCORES 25     [place] Suicide Severity Rating Scale (Lifetime/Recent)      5/20/2020     3:00 PM 6/18/2020    11:00 AM 7/13/2020     3:00 PM 4/4/2024     8:37 AM   [place] Suicide Severity Rating (Lifetime/Recent)   Q1 Wish to be Dead (Lifetime) Yes Yes     Q2 Non-Specific Active Suicidal Thoughts (Lifetime) No Yes     Most Severe Ideation Rating (Lifetime) 3 4     Frequency (Lifetime) 3 1     Duration (Lifetime) 1 1     Controllability (Lifetime) 1 2     Protective Factors  (Lifetime) 1 1     Reasons for Ideation (Lifetime) 5 5     Q1 Wished to be Dead (Past Month)   no    Q2 Suicidal Thoughts (Past Month)   no    RETIRED: 1. Wish to be Dead (Recent) Yes Yes     RETIRED: 2. Non-Specific Active Suicidal Thoughts (Recent) No Yes     3. Active Suicidal Ideation with any Methods (Not Plan) Without Intent to Act (Lifetime) No No     RETIRED: 3. Active Suicidal Ideation with any Methods (Not Plan) Without Intent to Act (Recent) No No     RETIRE: 4. Active Suicidal Ideation with Some Intent to Act, Without Specific Plan (Lifetime) No No     4. Active Suicidal Ideation with Some Intent to Act, Without Specific Plan (Recent) No No     RETIRE: 5. Active Suicidal Ideation with Specific Plan and Intent (Lifetime) No No     RETIRED: 5. Active Suicidal Ideation with Specific Plan and Intent (Recent) No No     Most Severe  Ideation Rating (Past Month)  4     Frequency (Past Month)  1     Duration (Past Month)  1     Controllability (Past Month)  1     Protective Factors (Past Month)  1     Reasons for Ideation (Past Month)  5     Actual Attempt (Lifetime)  No     Actual Attempt (Past 3 Months)  No     Has subject engaged in non-suicidal self-injurious behavior? (Lifetime)  No     Has subject engaged in non-suicidal self-injurious behavior? (Past 3 Months)  No     Interrupted Attempts (Lifetime)  No     Interrupted Attempts (Past 3 Months)  No     Aborted or Self-Interrupted Attempt (Lifetime)  No     Aborted or Self-Interrupted Attempt (Past 3 Months)  No     Preparatory Acts or Behavior (Lifetime)  No     Preparatory Acts or Behavior (Past 3 Months)  No     1. Wish to be Dead (Past 1 Month)    N   2. Non-Specific Active Suicidal Thoughts (Past 1 Month)    N   Calculated C-SSRS Risk Score (Lifetime/Recent)    No Risk Indicated       Personal and Family Medical History:  Patient does report a family history of mental health concerns.  Patient reports family history includes Cerebrovascular Disease in her father; Diabetes in her father; Hypertension in her father and mother..     Patient does report Mental Health Diagnosis and/or Treatment.  Patient reported the following previous diagnoses which include(s): an anxiety disorder .  Patient reported symptoms began years ago, worsened in 2019.  Patient has received mental health services in the past:  therapy  .  Psychiatric Hospitalizations: none when   ,  ,  ,  ,  ,  ,  ,  ,  ,  ,  .    Patient denies a history of civil commitment.      Currently, patient none  is receiving other mental health services.  These include none.       Patient has had a physical exam to rule out medical causes for current symptoms.  Date of last physical exam was within the past year. Client was encouraged to follow up with PCP if symptoms were to develop. The patient has a [place] Primary Care Provider, who  is named Faith Malik.  Patient reports the following current medical concerns: fibromyalgia .  Patient reports pain concerns including fibromyalgia.  Patient does not want help addressing pain concerns..   There are not significant appetite / nutritional concerns / weight changes.   Patient does not report a history of head injury / trauma / cognitive impairment.      Patient reports current meds as:   Current Outpatient Medications   Medication Sig Dispense Refill    acetaminophen (TYLENOL) 500 MG tablet [ACETAMINOPHEN (TYLENOL) 500 MG TABLET] Take 500 mg by mouth every 6 (six) hours as needed for pain.      escitalopram (LEXAPRO) 5 MG tablet Take 1 tablet (5 mg) by mouth daily 60 tablet 1    ibuprofen (ADVIL/MOTRIN) 200 MG tablet Take 200-400 mg by mouth every 6 hours as needed      multivitamin therapeutic tablet [MULTIVITAMIN THERAPEUTIC TABLET] Take 1 tablet by mouth daily.      tretinoin (RETIN-A) 0.05 % external cream Apply topically At Bedtime 45 g 3    vitamin D3 (CHOLECALCIFEROL) 50 mcg (2000 units) tablet Take 1 tablet by mouth daily       No current facility-administered medications for this visit.       Medication Adherence:  Patient reports taking.  taking prescribed medications as prescribed.    Patient Allergies:  No Known Allergies    Medical History:    Past Medical History:   Diagnosis Date    Adjustment disorder with anxious mood 03/08/2021    Aneurysm (H24)     Anxiety 06/22/2020    Depression          Current Mental Status Exam:   Appearance:  Appropriate    Eye Contact:  Good   Psychomotor:  Normal       Gait / station:  no problem  Attitude / Demeanor: Cooperative   Speech      Rate / Production: Normal/ Responsive      Volume:  Normal  volume      Language:  intact  Mood:   Anxious   Affect:   Appropriate  Tearful Worrisome    Thought Content: Clear   Thought Process: Logical       Associations: No loosening of associations  Insight:   Good   Judgment:  Intact    Orientation:  All  Attention/concentration: Good    Substance Use:   Patient did not report a family history of substance use concerns; see medical history section for details.  Patient has not received chemical dependency treatment in the past.  Patient has ever been to detox.      Patient is not currently receiving any chemical dependency treatment.           Substance History of use Age of first use Date of last use     Pattern and duration of use (include amounts and frequency)   Alcohol never used       REPORTS SUBSTANCE USE: N/A   Cannabis   never used     REPORTS SUBSTANCE USE: N/A     Amphetamines   never used     REPORTS SUBSTANCE USE: N/A   Cocaine/crack    never used       REPORTS SUBSTANCE USE: N/A   Hallucinogens never used         REPORTS SUBSTANCE USE: N/A   Inhalants never used         REPORTS SUBSTANCE USE: N/A   Heroin never used         REPORTS SUBSTANCE USE: N/A   Other Opiates never used     REPORTS SUBSTANCE USE: N/A   Benzodiazepine   never used     REPORTS SUBSTANCE USE: N/A   Barbiturates never used     REPORTS SUBSTANCE USE: N/A   Over the counter meds never used     REPORTS SUBSTANCE USE: N/A   Caffeine never used     REPORTS SUBSTANCE USE: N/A   Nicotine  never used     REPORTS SUBSTANCE USE: N/A   Other substances not listed above:  Identify:  never used     REPORTS SUBSTANCE USE: N/A     Patient reported the following problems as a result of their substance use: no problems, not applicable.    Substance Use: No symptoms    Based on the negative CAGE score and clinical interview there  are not indications of drug or alcohol abuse.    Significant Losses / Trauma / Abuse / Neglect Issues:   Patient did not  serve in the .  There are indications or report of significant loss, trauma, abuse or neglect issues related to: are indications or report of significant loss, trauma, abuse or neglect issues related to 's health issues; COVID  Concerns for possible neglect are not  "present.     Safety Assessment:   Patient denies current homicidal ideation and behaviors.  Patient denies current self-injurious ideation and behaviors.    Patient denied risk behaviors associated with substance use.   Patient denies any high risk behaviors associated with mental health symptoms.  Patient reports the following current concerns for their personal safety: None.  Patient reports there are not firearms in the house.       There are no firearms in the home..    History of Safety Concerns:  Patient denied a history of homicidal ideation.     Patient denied a history of personal safety concerns.    Patient denied a history of assaultive behaviors.    Patient denied a history of sexual assault behaviors.     Patient denied a history of risk behaviors associated with substance use.  Patient denies any history of high risk behaviors associated with mental health symptoms.  Patient reports the following protective factors: forward or future oriented thinking; dedication to family or friends; safe and stable environment; effectively controls impulses; regular physical activity; sense of belonging; purpose; daily obligations; financial stability    Risk Plan:  See Recommendations for Safety and Risk Management Plan    Review of Symptoms per patient report:   Depression: Lack of interest, Change in energy level, Difficulties concentrating, Feelings of hopelessness, Feelings of helplessness, Low self-worth, Ruminations, Feeling sad, down, or depressed, and Withdrawn  Kylah:  No Symptoms  Psychosis: No Symptoms  Anxiety: Excessive worry, Nervousness, Physical complaints, such as headaches, stomachaches, muscle tension, Fears/phobias \" will die\", Sleep disturbance, Psychomotor agitation, Ruminations, Poor concentration, and Irritability  Panic:  No symptoms  Post Traumatic Stress Disorder:  No Symptoms   Eating Disorder: No Symptoms  ADD / ADHD:  No symptoms  Conduct Disorder: No symptoms  Autism Spectrum " Disorder: No symptoms  Obsessive Compulsive Disorder: No Symptoms    Patient reports the following compulsive behaviors and treatment history:  none identified .      Diagnostic Criteria:   Generalized Anxiety Disorder  A. Excessive anxiety and worry about a number of events or activities (such as work or school performance).   B. The person finds it difficult to control the worry.  C. Select 3 or more symptoms (required for diagnosis). Only one item is required in children.   - Restlessness or feeling keyed up or on edge.    - Being easily fatigued.    - Difficulty concentrating or mind going blank.    - Muscle tension.    - Sleep disturbance (difficulty falling or staying asleep, or restless unsatisfying sleep).   D. The focus of the anxiety and worry is not confined to features of an Axis I disorder.  E. The anxiety, worry, or physical symptoms cause clinically significant distress or impairment in social, occupational, or other important areas of functioning.   F. The disturbance is not due to the direct physiological effects of a substance (e.g., a drug of abuse, a medication) or a general medical condition (e.g., hyperthyroidism) and does not occur exclusively during a Mood Disorder, a Psychotic Disorder, or a Pervasive Developmental Disorder.    Functional Status:  Patient reports the following functional impairments:  management of the household and or completion of tasks, relationship(s), and self-care.     Nonprogrammatic care:  Patient is requesting basic services to address current mental health concerns.    Clinical Summary:  1. Psychosocial, Cultural and Contextual Factors: 43 year old mother of two, born in [place]  .  2. Principal DSM5 Diagnoses  (Sustained by DSM5 Criteria Listed Above):   300.02 (F41.1) Generalized Anxiety Disorder.  3. Other Diagnoses that is relevant to services:   296.32 (F33.1) Major Depressive Disorder, Recurrent Episode, Moderate With anxious distress.  4. Provisional  Diagnosis:  none  5. Prognosis: Expect Improvement and Relieve Acute Symptoms.  6. Likely consequences of symptoms if not treated: worsening symptoms.  7. Client strengths include:  caring, committed to sobriety, educated, empathetic, employed, goal-focused, good listener, has a previous history of therapy, insightful, intelligent, motivated, open to learning, open to suggestions / feedback, responsible parent, supportive, wants to learn, willing to ask questions, willing to relate to others, and work history .     Recommendations:     1. Plan for Safety and Risk Management:   Safety and Risk: Recommended that patient call 911 or go to the local ED should there be a change in any of these risk factors..          Report to child / adult protection services was NA.     2. Patient's identified cultural concerns will be addressed by acknowledgement of heritage and family background .     3. Initial Treatment will focus on:    Anxiety - managing worries and re-framing unhelpful thoughts, focusing on what is within her control   Adjustment Difficulties related to: family concerns.     4. Resources/Service Plan:    services are not indicated.   Modifications to assist communication are not indicated.   Additional disability accommodations are not indicated.      5. Collaboration:   Collaboration / coordination of treatment will be initiated with the following  support professionals: primary care physician.      6.  Referrals:   The following referral(s) will be initiated:  none .       A Release of Information has been obtained for the following:  none .     Clinical Substantiation/medical necessity for the above recommendations:  NA.    7. REBECCA:    REBECCA:  Discussed the general effects of drugs and alcohol on health and well-being. Provider gave patient printed information about the  effects of chemical use on their health and well being. Recommendations:  no concerns noted  .     8. Records:   These were reviewed  at time of assessment.   Information in this assessment was obtained from the medical record and  provided by patient who is a good historian.    Patient will have open access to their mental health medical record.    9.   Interactive Complexity: No    10. Safety Plan: none required     Provider Name/ Credentials:  KI Josue  April 4, 2024

## 2024-04-05 ASSESSMENT — PATIENT HEALTH QUESTIONNAIRE - PHQ9: SUM OF ALL RESPONSES TO PHQ QUESTIONS 1-9: 7

## 2024-04-05 ASSESSMENT — ANXIETY QUESTIONNAIRES: GAD7 TOTAL SCORE: 6

## 2024-04-22 ENCOUNTER — TELEPHONE (OUTPATIENT)
Dept: FAMILY MEDICINE | Facility: CLINIC | Age: 44
End: 2024-04-22
Payer: COMMERCIAL

## 2024-04-22 NOTE — TELEPHONE ENCOUNTER
Reason for Call:  Appointment Request    Patient requesting this type of appt:  Preventive     Requested provider: Faith Malik    Reason patient unable to be scheduled: Not within requested timeframe    When does patient want to be seen/preferred time:  in the next 2-3 weeks    Comments: patient has to take her  for his brain surgery follow up on the day she is scheduled for her physical. She does not have anyone else to drive him. She is wondering if she could be worked in to see Dr Malik in the next 2-3 weeks     Could we send this information to you in [facility] or would you prefer to receive a phone call?:   No preference   Okay to leave a detailed message?: Yes at [phone]     Call taken on 4/22/2024 at 11:41 AM by Mago Almodovar

## 2024-04-23 NOTE — TELEPHONE ENCOUNTER
Appt r/s to Mon 5/20.     Dr. Malik, pt would like you to know that she appreciate and thank you for getting her in early :)

## 2024-04-23 NOTE — TELEPHONE ENCOUNTER
I could see her on Monday, 5/20 at the 10:40 Est Care spot- ok to double book. 40 minute preventive visit spot. If that doesn't work, ok to use any 40 minute Establish Care Spots. Thank you.     Faith Malik MD

## 2024-05-02 ENCOUNTER — VIRTUAL VISIT (OUTPATIENT)
Dept: BEHAVIORAL HEALTH | Facility: CLINIC | Age: 44
End: 2024-05-02
Payer: COMMERCIAL

## 2024-05-02 DIAGNOSIS — F41.1 GENERALIZED ANXIETY DISORDER: Primary | ICD-10-CM

## 2024-05-02 PROCEDURE — 90837 PSYTX W PT 60 MINUTES: CPT | Mod: 95 | Performed by: SOCIAL WORKER

## 2024-05-02 ASSESSMENT — ANXIETY QUESTIONNAIRES
8. IF YOU CHECKED OFF ANY PROBLEMS, HOW DIFFICULT HAVE THESE MADE IT FOR YOU TO DO YOUR WORK, TAKE CARE OF THINGS AT HOME, OR GET ALONG WITH OTHER PEOPLE?: SOMEWHAT DIFFICULT
7. FEELING AFRAID AS IF SOMETHING AWFUL MIGHT HAPPEN: MORE THAN HALF THE DAYS
3. WORRYING TOO MUCH ABOUT DIFFERENT THINGS: MORE THAN HALF THE DAYS
GAD7 TOTAL SCORE: 11
6. BECOMING EASILY ANNOYED OR IRRITABLE: SEVERAL DAYS
2. NOT BEING ABLE TO STOP OR CONTROL WORRYING: MORE THAN HALF THE DAYS
4. TROUBLE RELAXING: MORE THAN HALF THE DAYS
5. BEING SO RESTLESS THAT IT IS HARD TO SIT STILL: NOT AT ALL
1. FEELING NERVOUS, ANXIOUS, OR ON EDGE: MORE THAN HALF THE DAYS
GAD7 TOTAL SCORE: 11
7. FEELING AFRAID AS IF SOMETHING AWFUL MIGHT HAPPEN: MORE THAN HALF THE DAYS
GAD7 TOTAL SCORE: 11
IF YOU CHECKED OFF ANY PROBLEMS ON THIS QUESTIONNAIRE, HOW DIFFICULT HAVE THESE PROBLEMS MADE IT FOR YOU TO DO YOUR WORK, TAKE CARE OF THINGS AT HOME, OR GET ALONG WITH OTHER PEOPLE: SOMEWHAT DIFFICULT

## 2024-05-02 NOTE — PROGRESS NOTES
[facility]                                     Progress Note    Patient Name: Anahi Stockton  Date: 5/2/2024         Service Type: Individual      Session Start Time: 11:00am  Session End Time: 11:53am     Session Length: 53 minutes    Session #: 1    Attendees: Client attended alone    Service Modality:  Video Visit:      Provider verified identity through the following two step process.  Patient provided:  Patient is known previously to provider    Telemedicine Visit: The patient's condition can be safely assessed and treated via synchronous audio and visual telemedicine encounter.      Reason for Telemedicine Visit: Patient has requested telehealth visit    Originating Site (Patient Location): Patient's home    Distant Site (Provider Location): Provider Remote Setting- Home Office    Consent:  The patient/guardian has verbally consented to: the potential risks and benefits of telemedicine (video visit) versus in person care; bill my insurance or make self-payment for services provided; and responsibility for payment of non-covered services.     Patient would like the video invitation sent by:  My Chart    Mode of Communication:  Video Conference via [facility]    Distant Location (Provider):  Off-site    As the provider I attest to compliance with applicable laws and regulations related to telemedicine.    DATA  Extended Session (53+ minutes): PROLONGED SERVICE IN THE OUTPATIENT SETTING REQUIRING DIRECT (FACE-TO-FACE) PATIENT CONTACT BEYOND THE USUAL SERVICE:    - Treatment protocol required additional time to complete, due to the nature of diagnosis being treated.  See Interventions section for details  Interactive Complexity: No  Crisis: No        Progress Since Last Session (Related to Symptoms / Goals / Homework):   Symptoms: No change anxiety    Homework: Achieved / completed to satisfaction      Episode of Care Goals: Satisfactory progress - ACTION (Actively working towards change);  Intervened by reinforcing change plan / affirming steps taken     Current / Ongoing Stressors and Concerns:   Patient provides a status update while therapist utilizes reflective listening skills. Patient reports that her  had a successful surgery to remove a tumor and this has provided a lot of relief. They traveled as a family to spend time in [place] while her  underwent surgery and this went well. Patient has been on FMLA during this period of time while her  is recovering. She is currently trying to process the whole experience - so much stress and fear. She worries about returning to work while her  is at home but still is planning on returning next week. She is going to reduce her work hours to 30 hours a week for the next year. She has a lot of anxiety that something else bad will happen next. She feels anxious everyday and exhausted. She often keeps her thoughts and feelings to herself.      Treatment Objective(s) Addressed in This Session:   identify the fears / thoughts that contribute to feeling anxious  Gain insight      Intervention:   Discussed mindfulness as being aware of what we are experiencing while we are experiencing it.  Contrasted this with avoidance and rumination.  Explored the role of mindfulness as an overall coping strategy for managing depression, anxiety, and strong emotions.  Explained concept of state of mind using SIFT (sensations, images, feelings, thoughts) pneumonic.  Led client in a guided mindfulness exercise focusing on sensations, images, feelings, and thoughts.  Discussed mindfulness as a tool to intentionally shift our awareness and focus as needed.       Assessments completed prior to visit:  The following assessments were completed by patient for this visit:  PHQ9:       2/16/2021     1:00 PM 2/23/2021    11:00 AM 4/24/2023     7:57 AM 8/23/2023    10:56 AM 3/3/2024     9:51 AM 3/4/2024     8:36 AM 4/4/2024     8:43 AM   PHQ-9 SCORE   PHQ-9  Total Score MyChart   7 (Mild depression) 0 19 (Moderately severe depression)  7 (Mild depression)   PHQ-9 Total Score 10 8 7 0 19 19 7     GAD7:       11/6/2020    11:00 AM 12/29/2020     3:00 PM 2/16/2021     1:00 PM 2/23/2021    11:00 AM 3/3/2024     9:56 AM 4/1/2024     9:34 AM 4/4/2024     8:43 AM   MING-7 SCORE   Total Score     21 (severe anxiety) 16 (severe anxiety) 6 (mild anxiety)   Total Score 20 17 7 4 21 16 6     PROMIS 10-Global Health (only subscores and total score):       4/4/2024     8:39 AM   PROMIS-10 Scores Only   Global Mental Health Score 13   Global Physical Health Score 12   PROMIS TOTAL - SUBSCORES 25         ASSESSMENT: Current Emotional / Mental Status (status of significant symptoms):   Risk status (Self / Other harm or suicidal ideation)   Patient denies current fears or concerns for personal safety.   Patient denies current or recent suicidal ideation or behaviors.   Patient denies current or recent homicidal ideation or behaviors.   Patient denies current or recent self injurious behavior or ideation.   Patient denies other safety concerns.   Patient reports there has been no change in risk factors since their last session.     Patient reports there has been no change in protective factors since their last session.     Recommended that patient call 911 or go to the local ED should there be a change in any of these risk factors.     Appearance:   Appropriate    Eye Contact:   Good    Psychomotor Behavior: Normal    Attitude:   Cooperative    Orientation:   All   Speech    Rate / Production: Normal/ Responsive Normal     Volume:  Normal    Mood:    Anxious  Normal   Affect:    Appropriate    Thought Content:  Clear    Thought Form:  Coherent  Logical    Insight:    Good      Medication Review:   No changes to current psychiatric medication(s)     Medication Compliance:   Yes     Changes in Health Issues:   None reported     Chemical Use Review:   Substance Use: Chemical use reviewed, no  active concerns identified      Tobacco Use: No current tobacco use.      Diagnosis:  1. Generalized anxiety disorder        Collateral Reports Completed:   Not Applicable    PLAN: (Patient Tasks / Therapist Tasks / Other)  Return for a virtual visit in 3 weeks    Journal prompt:  -Write down the worry  For example: someone in my family will get sick again  -Identify and validate the emotion  For example: I am feeling scared and sad because I love my family so much   -Offer a re-frame and statement of compassion  For example: It's okay to feel scared; I'm still going to keep living my life in love and know that I can handle any problem when it happens   -Remember the evidence: you have persevered through struggles in the past        Carolyne Figueredo, [facility]                                                         ______________________________________________________________________    Individual Treatment Plan    Patient's Name: Anahi Stockton  YOB: 1980    Date of Creation: 5/2/2024  Date Treatment Plan Last Reviewed/Revised: 5/2/2024    DSM5 Diagnoses: 300.02 (F41.1) Generalized Anxiety Disorder  Psychosocial / Contextual Factors: 43 year old female, , mother of 2 girls  PROMIS (reviewed every 90 days):   PROMIS 10-Global Health (only subscores and total score):       4/4/2024     8:39 AM   PROMIS-10 Scores Only   Global Mental Health Score 13   Global Physical Health Score 12   PROMIS TOTAL - SUBSCORES 25       Referral / Collaboration:  Referral to another professional/service is not indicated at this time..    Anticipated number of session for this episode of care: 3-6 sessions  Anticipation frequency of session: Monthly  Anticipated Duration of each session: 53 or more minutes  Treatment plan will be reviewed in 90 days or when goals have been changed.       MeasurableTreatment Goal(s) related to diagnosis / functional impairment(s)  Goal 1: Patient will manage symptoms of anxiety as  evidenced by a MING-7 score of 5 or lower    I will know I've met my goal when I feel more confident.      Objective #A (Patient Action)    Patient will identify the fears / thoughts that contribute to feeling anxious.  Status: New - Date: 5/2/2024      Intervention(s)  Therapist will teach emotional recognition/identification.   .    Objective #B  Patient will use relaxation strategies multiple times per day to reduce the physical symptoms of anxiety.  Status: New - Date: 5/2/2024      Intervention(s)  Therapist will  teach relaxation strategies .    Objective #C  Patient will use thought-stopping strategy daily to reduce intrusive thoughts.  Status: New - Date: 5/2/2024      Intervention(s)  Therapist will  teach thought stopping and other CBT strategies to manage thoughts .      Patient has reviewed and agreed to the above plan.      Carolyne Figueredo, [facility]  May 2, 2024

## 2024-05-14 ASSESSMENT — PATIENT HEALTH QUESTIONNAIRE - PHQ9: SUM OF ALL RESPONSES TO PHQ QUESTIONS 1-9: 7

## 2024-05-22 ASSESSMENT — ANXIETY QUESTIONNAIRES
IF YOU CHECKED OFF ANY PROBLEMS ON THIS QUESTIONNAIRE, HOW DIFFICULT HAVE THESE PROBLEMS MADE IT FOR YOU TO DO YOUR WORK, TAKE CARE OF THINGS AT HOME, OR GET ALONG WITH OTHER PEOPLE: SOMEWHAT DIFFICULT
GAD7 TOTAL SCORE: 9
8. IF YOU CHECKED OFF ANY PROBLEMS, HOW DIFFICULT HAVE THESE MADE IT FOR YOU TO DO YOUR WORK, TAKE CARE OF THINGS AT HOME, OR GET ALONG WITH OTHER PEOPLE?: SOMEWHAT DIFFICULT
4. TROUBLE RELAXING: SEVERAL DAYS
3. WORRYING TOO MUCH ABOUT DIFFERENT THINGS: SEVERAL DAYS
5. BEING SO RESTLESS THAT IT IS HARD TO SIT STILL: SEVERAL DAYS
7. FEELING AFRAID AS IF SOMETHING AWFUL MIGHT HAPPEN: SEVERAL DAYS
GAD7 TOTAL SCORE: 9
2. NOT BEING ABLE TO STOP OR CONTROL WORRYING: MORE THAN HALF THE DAYS
GAD7 TOTAL SCORE: 9
7. FEELING AFRAID AS IF SOMETHING AWFUL MIGHT HAPPEN: SEVERAL DAYS
1. FEELING NERVOUS, ANXIOUS, OR ON EDGE: MORE THAN HALF THE DAYS
6. BECOMING EASILY ANNOYED OR IRRITABLE: SEVERAL DAYS

## 2024-05-23 ENCOUNTER — VIRTUAL VISIT (OUTPATIENT)
Dept: BEHAVIORAL HEALTH | Facility: CLINIC | Age: 44
End: 2024-05-23
Payer: COMMERCIAL

## 2024-05-23 DIAGNOSIS — F41.1 GENERALIZED ANXIETY DISORDER: Primary | ICD-10-CM

## 2024-05-23 PROCEDURE — 90837 PSYTX W PT 60 MINUTES: CPT | Mod: 95 | Performed by: SOCIAL WORKER

## 2024-05-23 NOTE — PROGRESS NOTES
[facility]                                     Progress Note    Patient Name: Anahi Stockton  Date: 5/23/2024         Service Type: Individual      Session Start Time: 10:00am  Session End Time: 10:53am     Session Length: 53 minutes    Session #: 2    Attendees: Client attended alone    Service Modality:  Video Visit:      Provider verified identity through the following two step process.  Patient provided:  Patient is known previously to provider    Telemedicine Visit: The patient's condition can be safely assessed and treated via synchronous audio and visual telemedicine encounter.      Reason for Telemedicine Visit: Patient has requested telehealth visit    Originating Site (Patient Location): Patient's home    Distant Site (Provider Location): Provider Remote Setting- Home Office    Consent:  The patient/guardian has verbally consented to: the potential risks and benefits of telemedicine (video visit) versus in person care; bill my insurance or make self-payment for services provided; and responsibility for payment of non-covered services.     Patient would like the video invitation sent by:  My Chart    Mode of Communication:  Video Conference via [facility]    Distant Location (Provider):  Off-site    As the provider I attest to compliance with applicable laws and regulations related to telemedicine.    DATA  Extended Session (53+ minutes): PROLONGED SERVICE IN THE OUTPATIENT SETTING REQUIRING DIRECT (FACE-TO-FACE) PATIENT CONTACT BEYOND THE USUAL SERVICE:    - Treatment protocol required additional time to complete, due to the nature of diagnosis being treated.  See Interventions section for details  Interactive Complexity: No  Crisis: No        Progress Since Last Session (Related to Symptoms / Goals / Homework):   Symptoms: No change anxiety    Homework: Achieved / completed to satisfaction      Episode of Care Goals: Satisfactory progress - ACTION (Actively working towards change);  Intervened by reinforcing change plan / affirming steps taken     Current / Ongoing Stressors and Concerns:   Patient provides a status update while therapist utilizes reflective listening skills. Patient shares that she has transitioned back to working - the first week was hard but it has gotten better. Patient acknowledges patterns of rumination and identifies worries. Her goal is to spend less time worrying and more time enjoying things. She is trying to be kinder to herself, especially while her  is recovering from surgery, and she has the majority of household chores and tasks and responsibilities.      Treatment Objective(s) Addressed in This Session:   identify the fears / thoughts that contribute to feeling anxious  Gain insight      Intervention:   Discussed mindfulness as being aware of what we are experiencing while we are experiencing it.  Contrasted this with avoidance and rumination.  Explored the role of mindfulness as an overall coping strategy for managing depression, anxiety, and strong emotions.  Led client in a guided mindfulness exercise focusing on sensations, images, feelings, and thoughts.  Discussed mindfulness as a tool to intentionally shift our awareness and focus as needed. Reviewed thought re-framing practice and importance of writing it out using following prompt:  Journal prompt:  -Write down the worry  For example: someone in my family will get sick again  -Identify and validate the emotion  For example: I am feeling scared and sad because I love my family so much   -Offer a re-frame and statement of compassion  For example: It's okay to feel scared; I'm still going to keep living my life in love and know that I can handle any problem when it happens   -Remember the evidence: you have persevered through struggles in the past      Assessments completed prior to visit:  The following assessments were completed by patient for this visit:  PHQ9:       2/23/2021    11:00 AM  4/24/2023     7:57 AM 8/23/2023    10:56 AM 3/3/2024     9:51 AM 3/4/2024     8:36 AM 4/4/2024     8:43 AM 5/14/2024     9:49 PM   PHQ-9 SCORE   PHQ-9 Total Score MyChart  7 (Mild depression) 0 19 (Moderately severe depression)  7 (Mild depression) 7 (Mild depression)   PHQ-9 Total Score 8 7 0 19 19 7 7    7     GAD7:       2/16/2021     1:00 PM 2/23/2021    11:00 AM 3/3/2024     9:56 AM 4/1/2024     9:34 AM 4/4/2024     8:43 AM 5/2/2024    10:49 AM 5/22/2024     2:34 PM   MING-7 SCORE   Total Score   21 (severe anxiety) 16 (severe anxiety) 6 (mild anxiety) 11 (moderate anxiety) 9 (mild anxiety)   Total Score 7 4 21 16 6 11 9     PROMIS 10-Global Health (only subscores and total score):       4/4/2024     8:39 AM 5/2/2024    10:51 AM   PROMIS-10 Scores Only   Global Mental Health Score 13 12   Global Physical Health Score 12 12   PROMIS TOTAL - SUBSCORES 25 24         ASSESSMENT: Current Emotional / Mental Status (status of significant symptoms):   Risk status (Self / Other harm or suicidal ideation)   Patient denies current fears or concerns for personal safety.   Patient denies current or recent suicidal ideation or behaviors.   Patient denies current or recent homicidal ideation or behaviors.   Patient denies current or recent self injurious behavior or ideation.   Patient denies other safety concerns.   Patient reports there has been no change in risk factors since their last session.     Patient reports there has been no change in protective factors since their last session.     Recommended that patient call 911 or go to the local ED should there be a change in any of these risk factors.     Appearance:   Appropriate    Eye Contact:   Good    Psychomotor Behavior: Normal    Attitude:   Cooperative    Orientation:   All   Speech    Rate / Production: Normal/ Responsive Normal     Volume:  Normal    Mood:    Anxious  Normal   Affect:    Appropriate    Thought Content:  Clear    Thought Form:  Coherent  Logical     Insight:    Good      Medication Review:   No changes to current psychiatric medication(s)     Medication Compliance:   Yes     Changes in Health Issues:   None reported     Chemical Use Review:   Substance Use: Chemical use reviewed, no active concerns identified      Tobacco Use: No current tobacco use.      Diagnosis:  1. Generalized anxiety disorder        Collateral Reports Completed:   Not Applicable    PLAN: (Patient Tasks / Therapist Tasks / Other)  Return for a virtual visit in 1 month  Patient encouraged to use journal prompt for thought re-framing    There has been demonstrated improvement in functioning while patient has been engaged in psychotherapy/psychological service- if withdrawn the patient would deteriorate and/or relapse.           Carolyne Figueredo, [facility]                                                         ______________________________________________________________________    Individual Treatment Plan    Patient's Name: Anahi Stockton  YOB: 1980    Date of Creation: 5/2/2024  Date Treatment Plan Last Reviewed/Revised: 5/2/2024    DSM5 Diagnoses: 300.02 (F41.1) Generalized Anxiety Disorder  Psychosocial / Contextual Factors: 43 year old female, , mother of 2 girls  PROMIS (reviewed every 90 days):   PROMIS 10-Global Health (only subscores and total score):       4/4/2024     8:39 AM   PROMIS-10 Scores Only   Global Mental Health Score 13   Global Physical Health Score 12   PROMIS TOTAL - SUBSCORES 25       Referral / Collaboration:  Referral to another professional/service is not indicated at this time..    Anticipated number of session for this episode of care: 3-6 sessions  Anticipation frequency of session: Monthly  Anticipated Duration of each session: 53 or more minutes  Treatment plan will be reviewed in 90 days or when goals have been changed.       MeasurableTreatment Goal(s) related to diagnosis / functional impairment(s)  Goal 1: Patient will manage  symptoms of anxiety as evidenced by a MING-7 score of 5 or lower    I will know I've met my goal when I feel more confident.      Objective #A (Patient Action)    Patient will identify the fears / thoughts that contribute to feeling anxious.  Status: New - Date: 5/2/2024      Intervention(s)  Therapist will teach emotional recognition/identification.   .    Objective #B  Patient will use relaxation strategies multiple times per day to reduce the physical symptoms of anxiety.  Status: New - Date: 5/2/2024      Intervention(s)  Therapist will  teach relaxation strategies .    Objective #C  Patient will use thought-stopping strategy daily to reduce intrusive thoughts.  Status: New - Date: 5/2/2024      Intervention(s)  Therapist will  teach thought stopping and other CBT strategies to manage thoughts .      Patient has reviewed and agreed to the above plan.      Carolyne Figueredo, [facility]  May 2, 2024

## 2024-06-22 ENCOUNTER — HEALTH MAINTENANCE LETTER (OUTPATIENT)
Age: 44
End: 2024-06-22

## 2024-07-17 ASSESSMENT — ANXIETY QUESTIONNAIRES
GAD7 TOTAL SCORE: 7
4. TROUBLE RELAXING: SEVERAL DAYS
6. BECOMING EASILY ANNOYED OR IRRITABLE: SEVERAL DAYS
3. WORRYING TOO MUCH ABOUT DIFFERENT THINGS: MORE THAN HALF THE DAYS
5. BEING SO RESTLESS THAT IT IS HARD TO SIT STILL: NOT AT ALL
8. IF YOU CHECKED OFF ANY PROBLEMS, HOW DIFFICULT HAVE THESE MADE IT FOR YOU TO DO YOUR WORK, TAKE CARE OF THINGS AT HOME, OR GET ALONG WITH OTHER PEOPLE?: SOMEWHAT DIFFICULT
7. FEELING AFRAID AS IF SOMETHING AWFUL MIGHT HAPPEN: SEVERAL DAYS
1. FEELING NERVOUS, ANXIOUS, OR ON EDGE: SEVERAL DAYS
2. NOT BEING ABLE TO STOP OR CONTROL WORRYING: SEVERAL DAYS
7. FEELING AFRAID AS IF SOMETHING AWFUL MIGHT HAPPEN: SEVERAL DAYS
GAD7 TOTAL SCORE: 7
GAD7 TOTAL SCORE: 7

## 2024-07-17 ASSESSMENT — PATIENT HEALTH QUESTIONNAIRE - PHQ9
7. TROUBLE CONCENTRATING ON THINGS, SUCH AS READING THE NEWSPAPER OR WATCHING TELEVISION: NEARLY EVERY DAY
SUM OF ALL RESPONSES TO PHQ QUESTIONS 1-9: 11
1. LITTLE INTEREST OR PLEASURE IN DOING THINGS: SEVERAL DAYS
5. POOR APPETITE OR OVEREATING: SEVERAL DAYS
3. TROUBLE FALLING OR STAYING ASLEEP OR SLEEPING TOO MUCH: SEVERAL DAYS
10. IF YOU CHECKED OFF ANY PROBLEMS, HOW DIFFICULT HAVE THESE PROBLEMS MADE IT FOR YOU TO DO YOUR WORK, TAKE CARE OF THINGS AT HOME, OR GET ALONG WITH OTHER PEOPLE: SOMEWHAT DIFFICULT
SUM OF ALL RESPONSES TO PHQ QUESTIONS 1-9: 11
2. FEELING DOWN, DEPRESSED, IRRITABLE, OR HOPELESS: SEVERAL DAYS
4. FEELING TIRED OR HAVING LITTLE ENERGY: NEARLY EVERY DAY
SUM OF ALL RESPONSES TO PHQ QUESTIONS 1-9: 11
9. THOUGHTS THAT YOU WOULD BE BETTER OFF DEAD, OR OF HURTING YOURSELF: NOT AT ALL
10. IF YOU CHECKED OFF ANY PROBLEMS, HOW DIFFICULT HAVE THESE PROBLEMS MADE IT FOR YOU TO DO YOUR WORK, TAKE CARE OF THINGS AT HOME, OR GET ALONG WITH OTHER PEOPLE: SOMEWHAT DIFFICULT

## 2024-07-18 ENCOUNTER — VIRTUAL VISIT (OUTPATIENT)
Dept: BEHAVIORAL HEALTH | Facility: CLINIC | Age: 44
End: 2024-07-18
Payer: COMMERCIAL

## 2024-07-18 DIAGNOSIS — F41.1 GENERALIZED ANXIETY DISORDER: Primary | ICD-10-CM

## 2024-07-18 PROCEDURE — 90837 PSYTX W PT 60 MINUTES: CPT | Mod: 95 | Performed by: SOCIAL WORKER

## 2024-07-18 ASSESSMENT — PATIENT HEALTH QUESTIONNAIRE - PHQ9: SUM OF ALL RESPONSES TO PHQ QUESTIONS 1-9: 11

## 2024-07-18 ASSESSMENT — ANXIETY QUESTIONNAIRES: GAD7 TOTAL SCORE: 7

## 2024-07-18 NOTE — PROGRESS NOTES
[facility]                                     Progress Note    Patient Name: Anahi Stockton  Date: 7/18/2024         Service Type: Individual      Session Start Time: 8:00am  Session End Time: 8:53am     Session Length: 53 minutes    Session #: 3    Attendees: Client attended alone    Service Modality:  Video Visit:      Provider verified identity through the following two step process.  Patient provided:  Patient is known previously to provider    Telemedicine Visit: The patient's condition can be safely assessed and treated via synchronous audio and visual telemedicine encounter.      Reason for Telemedicine Visit: Patient has requested telehealth visit    Originating Site (Patient Location): Patient's home    Distant Site (Provider Location): Provider Remote Setting- Home Office    Consent:  The patient/guardian has verbally consented to: the potential risks and benefits of telemedicine (video visit) versus in person care; bill my insurance or make self-payment for services provided; and responsibility for payment of non-covered services.     Patient would like the video invitation sent by:  My Chart    Mode of Communication:  Video Conference via [facility]    Distant Location (Provider):  Off-site    As the provider I attest to compliance with applicable laws and regulations related to telemedicine.    DATA  Extended Session (53+ minutes): PROLONGED SERVICE IN THE OUTPATIENT SETTING REQUIRING DIRECT (FACE-TO-FACE) PATIENT CONTACT BEYOND THE USUAL SERVICE:    - Treatment protocol required additional time to complete, due to the nature of diagnosis being treated.  See Interventions section for details  Interactive Complexity: No  Crisis: No        Progress Since Last Session (Related to Symptoms / Goals / Homework):   Symptoms: No change anxiety    Homework: Achieved / completed to satisfaction      Episode of Care Goals: Satisfactory progress - ACTION (Actively working towards change);  Intervened by reinforcing change plan / affirming steps taken     Current / Ongoing Stressors and Concerns:   Patient provides a status update while therapist utilizes reflective listening skills. Patient shares that things have been very busy. She reports feeling tired emotionally and physically. 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 She shares that it can be hard to watch her  struggle. She reports that she does not have any time for self-care. She reports that she is just pushing through each day. Wondering how to re-frame beliefs about being a failure when meeting expectations versus exceeding expectations.        Treatment Objective(s) Addressed in This Session:   identify the fears / thoughts that contribute to feeling anxious  Gain insight      Intervention:   Discussed mindfulness as being aware of what we are experiencing while we are experiencing it.  Contrasted this with avoidance and rumination.  Explored the role of mindfulness as an overall coping strategy for managing depression, anxiety, and strong emotions.  Led client in a guided mindfulness exercise focusing on sensations, images, feelings, and thoughts.  Discussed mindfulness as a tool to intentionally shift our awareness and focus as needed. Discussed importance of taking breaks and restorative practices. Reviewed thought re-framing practice and importance of writing it out using following prompt:  Journal prompt:  -Write down the worry  For example: someone in my family will get sick again  -Identify and validate the emotion  For example: I am feeling scared and sad because I love my family so much   -Offer a re-frame and statement of compassion  For example: It's okay to feel scared; I'm still going to keep living my life in love and know that I can handle any problem when it happens    -Remember the evidence: you have persevered through struggles in the past      Assessments completed prior to visit:  The following assessments were completed by patient for this visit:  PHQ9:       4/24/2023     7:57 AM 8/23/2023    10:56 AM 3/3/2024     9:51 AM 3/4/2024     8:36 AM 4/4/2024     8:43 AM 5/14/2024     9:49 PM 7/17/2024     7:03 PM   PHQ-9 SCORE   PHQ-9 Total Score MyChart 7 (Mild depression) 0 19 (Moderately severe depression)  7 (Mild depression) 7 (Mild depression) 11 (Moderate depression)   PHQ-9 Total Score 7 0 19 19 7 7    7 11     GAD7:       2/23/2021    11:00 AM 3/3/2024     9:56 AM 4/1/2024     9:34 AM 4/4/2024     8:43 AM 5/2/2024    10:49 AM 5/22/2024     2:34 PM 7/17/2024     7:03 PM   MING-7 SCORE   Total Score  21 (severe anxiety) 16 (severe anxiety) 6 (mild anxiety) 11 (moderate anxiety) 9 (mild anxiety) 7 (mild anxiety)   Total Score 4 21 16 6 11 9 7     PROMIS 10-Global Health (only subscores and total score):       4/4/2024     8:39 AM 5/2/2024    10:51 AM 7/17/2024     6:59 PM   PROMIS-10 Scores Only   Global Mental Health Score 13 12 11   Global Physical Health Score 12 12 12   PROMIS TOTAL - SUBSCORES 25 24 23         ASSESSMENT: Current Emotional / Mental Status (status of significant symptoms):   Risk status (Self / Other harm or suicidal ideation)   Patient denies current fears or concerns for personal safety.   Patient denies current or recent suicidal ideation or behaviors.   Patient denies current or recent homicidal ideation or behaviors.   Patient denies current or recent self injurious behavior or ideation.   Patient denies other safety concerns.   Patient reports there has been no change in risk factors since their last session.     Patient reports there has been no change in protective factors since their last session.     Recommended that patient call 911 or go to the local ED should there be a change in any of these risk factors.     Appearance:   Appropriate     Eye Contact:   Good    Psychomotor Behavior: Normal    Attitude:   Cooperative    Orientation:   All   Speech    Rate / Production: Normal/ Responsive Normal     Volume:  Normal    Mood:    Anxious  Normal   Affect:    Appropriate    Thought Content:  Clear    Thought Form:  Coherent  Logical    Insight:    Good      Medication Review:   No changes to current psychiatric medication(s)     Medication Compliance:   Yes     Changes in Health Issues:   None reported     Chemical Use Review:   Substance Use: Chemical use reviewed, no active concerns identified      Tobacco Use: No current tobacco use.      Diagnosis:  1. Generalized anxiety disorder        Collateral Reports Completed:   Not Applicable    PLAN: (Patient Tasks / Therapist Tasks / Other)  Return for a virtual visit in September   Patient encouraged to manage expectations (good enough versus exceeds expectations)  Patient encouraged to practice mindfulness skills during the day *make sure to take a 30 minute lunch break  Patient encouraged to use journal prompt for thought re-framing    There has been demonstrated improvement in functioning while patient has been engaged in psychotherapy/psychological service- if withdrawn the patient would deteriorate and/or relapse.           Carolyne Figueredo, [facility]                                                         ______________________________________________________________________    Individual Treatment Plan    Patient's Name: Anahi Stockton  YOB: 1980    Date of Creation: 5/2/2024  Date Treatment Plan Last Reviewed/Revised: 5/2/2024    DSM5 Diagnoses: 300.02 (F41.1) Generalized Anxiety Disorder  Psychosocial / Contextual Factors: 43 year old female, , mother of 2 girls  PROMIS (reviewed every 90 days):   PROMIS 10-Global Health (only subscores and total score):       4/4/2024     8:39 AM   PROMIS-10 Scores Only   Global Mental Health Score 13   Global Physical Health Score 12   PROMIS  TOTAL - SUBSCORES 25       Referral / Collaboration:  Referral to another professional/service is not indicated at this time..    Anticipated number of session for this episode of care: 3-6 sessions  Anticipation frequency of session: Monthly  Anticipated Duration of each session: 53 or more minutes  Treatment plan will be reviewed in 90 days or when goals have been changed.       MeasurableTreatment Goal(s) related to diagnosis / functional impairment(s)  Goal 1: Patient will manage symptoms of anxiety as evidenced by a MING-7 score of 5 or lower    I will know I've met my goal when I feel more confident.      Objective #A (Patient Action)    Patient will identify the fears / thoughts that contribute to feeling anxious.  Status: New - Date: 5/2/2024      Intervention(s)  Therapist will teach emotional recognition/identification.   .    Objective #B  Patient will use relaxation strategies multiple times per day to reduce the physical symptoms of anxiety.  Status: New - Date: 5/2/2024      Intervention(s)  Therapist will  teach relaxation strategies .    Objective #C  Patient will use thought-stopping strategy daily to reduce intrusive thoughts.  Status: New - Date: 5/2/2024      Intervention(s)  Therapist will  teach thought stopping and other CBT strategies to manage thoughts .      Patient has reviewed and agreed to the above plan.      Carolyne Figueredo, [facility]  May 2, 2024

## 2024-08-23 DIAGNOSIS — F43.22 ADJUSTMENT DISORDER WITH ANXIOUS MOOD: ICD-10-CM

## 2024-08-23 RX ORDER — ESCITALOPRAM OXALATE 5 MG/1
5 TABLET ORAL DAILY
Qty: 90 TABLET | Refills: 0 | Status: SHIPPED | OUTPATIENT
Start: 2024-08-23

## 2024-09-05 ENCOUNTER — VIRTUAL VISIT (OUTPATIENT)
Dept: BEHAVIORAL HEALTH | Facility: CLINIC | Age: 44
End: 2024-09-05
Payer: COMMERCIAL

## 2024-09-05 DIAGNOSIS — F41.1 GENERALIZED ANXIETY DISORDER: Primary | ICD-10-CM

## 2024-09-05 PROCEDURE — 90837 PSYTX W PT 60 MINUTES: CPT | Mod: 95 | Performed by: SOCIAL WORKER

## 2024-09-05 NOTE — PROGRESS NOTES
[facility]                                     Progress Note    Patient Name: Anahi Stockton  Date: 9/5/2024         Service Type: Individual      Session Start Time: 10:00am  Session End Time: 10:53am     Session Length: 53 minutes    Session #: 4    Attendees: Client attended alone    Service Modality:  Video Visit:      Provider verified identity through the following two step process.  Patient provided:  Patient is known previously to provider    Telemedicine Visit: The patient's condition can be safely assessed and treated via synchronous audio and visual telemedicine encounter.      Reason for Telemedicine Visit: Patient has requested telehealth visit    Originating Site (Patient Location): Patient's home    Distant Site (Provider Location): Provider Remote Setting- Home Office    Consent:  The patient/guardian has verbally consented to: the potential risks and benefits of telemedicine (video visit) versus in person care; bill my insurance or make self-payment for services provided; and responsibility for payment of non-covered services.     Patient would like the video invitation sent by:  My Chart    Mode of Communication:  Video Conference via [facility]    Distant Location (Provider):  Off-site    As the provider I attest to compliance with applicable laws and regulations related to telemedicine.    DATA  Extended Session (53+ minutes): PROLONGED SERVICE IN THE OUTPATIENT SETTING REQUIRING DIRECT (FACE-TO-FACE) PATIENT CONTACT BEYOND THE USUAL SERVICE:    - Treatment protocol required additional time to complete, due to the nature of diagnosis being treated.  See Interventions section for details  Interactive Complexity: No  Crisis: No        Progress Since Last Session (Related to Symptoms / Goals / Homework):   Symptoms: No change anxiety    Homework: Achieved / completed to satisfaction      Episode of Care Goals: Satisfactory progress - ACTION (Actively working towards change);  Intervened by reinforcing change plan / affirming steps taken     Current / Ongoing Stressors and Concerns:   Patient provides a status update while therapist utilizes reflective listening skills. She reports that she has been doing well. August was a good month. Her anxiety symptoms felt manageable including reduced physical signs of stress. She took time off from work which felt really nice. She shares that both her girls are back in school. She is working 30 hours per week and feels that this was a very necessary decision - this will get reviewed again in March and she has worries about this. She reports that she is doing better with mindfulness breaks during the day - taking lunch breaks.   She shares that her  has continued to recovery from his surgery but is struggling with his mental health. They are both still emotionally recovering from the impact of the surgery and past medical issues. She expresses some feelings of insecurity.        Treatment Objective(s) Addressed in This Session:   identify the fears / thoughts that contribute to feeling anxious  Gain insight      Intervention:   Discussed mindfulness as being aware of what we are experiencing while we are experiencing it.  Contrasted this with avoidance and rumination.  Explored the role of mindfulness as an overall coping strategy for managing depression, anxiety, and strong emotions.  Led client in a guided mindfulness exercise focusing on sensations, images, feelings, and thoughts.  Discussed mindfulness as a tool to intentionally shift our awareness and focus as needed. Discussed importance of taking breaks and restorative practices. Reviewed thought re-framing practice and importance of writing it out using following prompt:  Journal prompt:  -Write down the worry  For example: someone in my family will get sick again  -Identify and validate the emotion  For example: I am feeling scared and sad because I love my family so much   -Offer a  re-frame and statement of compassion  For example: It's okay to feel scared; I'm still going to keep living my life in love and know that I can handle any problem when it happens   -Remember the evidence: you have persevered through struggles in the past      Assessments completed prior to visit:  The following assessments were completed by patient for this visit:  PHQ9:       4/24/2023     7:57 AM 8/23/2023    10:56 AM 3/3/2024     9:51 AM 3/4/2024     8:36 AM 4/4/2024     8:43 AM 5/14/2024     9:49 PM 7/17/2024     7:03 PM   PHQ-9 SCORE   PHQ-9 Total Score MyChart 7 (Mild depression) 0 19 (Moderately severe depression)  7 (Mild depression) 7 (Mild depression) 11 (Moderate depression)   PHQ-9 Total Score 7 0 19 19 7 7    7 11     GAD7:       2/23/2021    11:00 AM 3/3/2024     9:56 AM 4/1/2024     9:34 AM 4/4/2024     8:43 AM 5/2/2024    10:49 AM 5/22/2024     2:34 PM 7/17/2024     7:03 PM   MING-7 SCORE   Total Score  21 (severe anxiety) 16 (severe anxiety) 6 (mild anxiety) 11 (moderate anxiety) 9 (mild anxiety) 7 (mild anxiety)   Total Score 4 21 16 6 11 9 7     PROMIS 10-Global Health (only subscores and total score):       4/4/2024     8:39 AM 5/2/2024    10:51 AM 7/17/2024     6:59 PM   PROMIS-10 Scores Only   Global Mental Health Score 13 12 11   Global Physical Health Score 12 12 12   PROMIS TOTAL - SUBSCORES 25 24 23         ASSESSMENT: Current Emotional / Mental Status (status of significant symptoms):   Risk status (Self / Other harm or suicidal ideation)   Patient denies current fears or concerns for personal safety.   Patient denies current or recent suicidal ideation or behaviors.   Patient denies current or recent homicidal ideation or behaviors.   Patient denies current or recent self injurious behavior or ideation.   Patient denies other safety concerns.   Patient reports there has been no change in risk factors since their last session.     Patient reports there has been no change in protective  factors since their last session.     Recommended that patient call 911 or go to the local ED should there be a change in any of these risk factors.     Appearance:   Appropriate    Eye Contact:   Good    Psychomotor Behavior: Normal    Attitude:   Cooperative    Orientation:   All   Speech    Rate / Production: Normal/ Responsive Normal     Volume:  Normal    Mood:    Anxious  Normal   Affect:    Appropriate  Worrisome    Thought Content:  Clear    Thought Form:  Coherent  Logical    Insight:    Good      Medication Review:   No changes to current psychiatric medication(s)     Medication Compliance:   Yes     Changes in Health Issues:   None reported     Chemical Use Review:   Substance Use: Chemical use reviewed, no active concerns identified      Tobacco Use: No current tobacco use.      Diagnosis:  1. Generalized anxiety disorder        Collateral Reports Completed:   Not Applicable    PLAN: (Patient Tasks / Therapist Tasks / Other)  Return for a virtual visit in 1 month  Patient encouraged to practice grounding techniques   Patient encouraged to try guided meditation   Patient encouraged to practice mindfulness skills  Patient encouraged to use journal prompt for thought re-framing  Patient encouraged to challenge feelings of shame and guilt     There has been demonstrated improvement in functioning while patient has been engaged in psychotherapy/psychological service- if withdrawn the patient would deteriorate and/or relapse.           Carolyne Figueredo, [facility]                                                         ______________________________________________________________________    Individual Treatment Plan    Patient's Name: Anahi Stockton  YOB: 1980    Date of Creation: 5/2/2024  Date Treatment Plan Last Reviewed/Revised: 7/17/2024    DSM5 Diagnoses: 300.02 (F41.1) Generalized Anxiety Disorder  Psychosocial / Contextual Factors: 44 year old female, , mother of 2 girls  CORTEZ  (reviewed every 90 days):   PROMIS 10-Global Health (only subscores and total score):       4/4/2024     8:39 AM 5/2/2024    10:51 AM 7/17/2024     6:59 PM   PROMIS-10 Scores Only   Global Mental Health Score 13 12 11   Global Physical Health Score 12 12 12   PROMIS TOTAL - SUBSCORES 25 24 23       Referral / Collaboration:  Referral to another professional/service is not indicated at this time..    Anticipated number of session for this episode of care: 3-6 sessions  Anticipation frequency of session: Monthly  Anticipated Duration of each session: 53 or more minutes  Treatment plan will be reviewed in 90 days or when goals have been changed.       MeasurableTreatment Goal(s) related to diagnosis / functional impairment(s)  Goal 1: Patient will manage symptoms of anxiety as evidenced by a MING-7 score of 5 or lower    I will know I've met my goal when I feel more confident.      Objective #A (Patient Action)    Patient will identify the fears / thoughts that contribute to feeling anxious.  Status: Continued - Date(s): 7/17/2024     Intervention(s)  Therapist will teach emotional recognition/identification.   .    Objective #B  Patient will use relaxation strategies multiple times per day to reduce the physical symptoms of anxiety.  Status: Continued - Date(s): 7/17/2024     Intervention(s)  Therapist will  teach relaxation strategies .    Objective #C  Patient will use thought-stopping strategy daily to reduce intrusive thoughts.  Status: Continued - Date(s): 7/17/2024     Intervention(s)  Therapist will  teach thought stopping and other CBT strategies to manage thoughts .      Patient has reviewed and agreed to the above plan.      Carolyne Figueredo, [facility]  July 17, 2024

## 2024-09-08 ASSESSMENT — SOCIAL DETERMINANTS OF HEALTH (SDOH): HOW OFTEN DO YOU GET TOGETHER WITH FRIENDS OR RELATIVES?: ONCE A WEEK

## 2024-09-12 ENCOUNTER — OFFICE VISIT (OUTPATIENT)
Dept: FAMILY MEDICINE | Facility: CLINIC | Age: 44
End: 2024-09-12
Payer: COMMERCIAL

## 2024-09-12 ENCOUNTER — ANCILLARY PROCEDURE (OUTPATIENT)
Dept: MAMMOGRAPHY | Facility: CLINIC | Age: 44
End: 2024-09-12
Attending: FAMILY MEDICINE
Payer: COMMERCIAL

## 2024-09-12 VITALS
WEIGHT: 142.04 LBS | BODY MASS INDEX: 22.83 KG/M2 | SYSTOLIC BLOOD PRESSURE: 118 MMHG | OXYGEN SATURATION: 100 % | HEIGHT: 66 IN | TEMPERATURE: 97.2 F | RESPIRATION RATE: 16 BRPM | HEART RATE: 57 BPM | DIASTOLIC BLOOD PRESSURE: 79 MMHG

## 2024-09-12 DIAGNOSIS — Z83.3 FAMILY HISTORY OF DIABETES MELLITUS: ICD-10-CM

## 2024-09-12 DIAGNOSIS — Z00.00 ROUTINE GENERAL MEDICAL EXAMINATION AT A HEALTH CARE FACILITY: Primary | ICD-10-CM

## 2024-09-12 DIAGNOSIS — N92.0 MENORRHAGIA WITH REGULAR CYCLE: ICD-10-CM

## 2024-09-12 DIAGNOSIS — R53.82 CHRONIC FATIGUE: ICD-10-CM

## 2024-09-12 DIAGNOSIS — Z12.11 SCREEN FOR COLON CANCER: ICD-10-CM

## 2024-09-12 DIAGNOSIS — M25.531 RIGHT WRIST PAIN: ICD-10-CM

## 2024-09-12 DIAGNOSIS — G43.009 MIGRAINE WITHOUT AURA AND WITHOUT STATUS MIGRAINOSUS, NOT INTRACTABLE: ICD-10-CM

## 2024-09-12 DIAGNOSIS — Z00.00 ROUTINE GENERAL MEDICAL EXAMINATION AT A HEALTH CARE FACILITY: ICD-10-CM

## 2024-09-12 DIAGNOSIS — H61.21 IMPACTED CERUMEN OF RIGHT EAR: ICD-10-CM

## 2024-09-12 LAB
CHOLEST SERPL-MCNC: 209 MG/DL
ERYTHROCYTE [DISTWIDTH] IN BLOOD BY AUTOMATED COUNT: 11.7 % (ref 10–15)
FASTING STATUS PATIENT QL REPORTED: ABNORMAL
FASTING STATUS PATIENT QL REPORTED: NORMAL
GLUCOSE SERPL-MCNC: 86 MG/DL (ref 70–99)
HBA1C MFR BLD: 5 % (ref 0–5.6)
HCT VFR BLD AUTO: 40.8 % (ref 35–47)
HDLC SERPL-MCNC: 79 MG/DL
HGB BLD-MCNC: 13.9 G/DL (ref 11.7–15.7)
IRON SERPL-MCNC: 121 UG/DL (ref 37–145)
LDLC SERPL CALC-MCNC: 121 MG/DL
MCH RBC QN AUTO: 30.2 PG (ref 26.5–33)
MCHC RBC AUTO-ENTMCNC: 34.1 G/DL (ref 31.5–36.5)
MCV RBC AUTO: 89 FL (ref 78–100)
NONHDLC SERPL-MCNC: 130 MG/DL
PLATELET # BLD AUTO: 211 10E3/UL (ref 150–450)
RBC # BLD AUTO: 4.6 10E6/UL (ref 3.8–5.2)
TRIGL SERPL-MCNC: 46 MG/DL
TSH SERPL DL<=0.005 MIU/L-ACNC: 2.54 UIU/ML (ref 0.3–4.2)
VIT D+METAB SERPL-MCNC: 35 NG/ML (ref 20–50)
WBC # BLD AUTO: 4.9 10E3/UL (ref 4–11)

## 2024-09-12 PROCEDURE — 84443 ASSAY THYROID STIM HORMONE: CPT | Performed by: FAMILY MEDICINE

## 2024-09-12 PROCEDURE — 77067 SCR MAMMO BI INCL CAD: CPT | Mod: TC | Performed by: RADIOLOGY

## 2024-09-12 PROCEDURE — 99396 PREV VISIT EST AGE 40-64: CPT | Performed by: FAMILY MEDICINE

## 2024-09-12 PROCEDURE — 85027 COMPLETE CBC AUTOMATED: CPT | Performed by: FAMILY MEDICINE

## 2024-09-12 PROCEDURE — 80061 LIPID PANEL: CPT | Performed by: FAMILY MEDICINE

## 2024-09-12 PROCEDURE — 82306 VITAMIN D 25 HYDROXY: CPT | Performed by: FAMILY MEDICINE

## 2024-09-12 PROCEDURE — 83036 HEMOGLOBIN GLYCOSYLATED A1C: CPT | Performed by: FAMILY MEDICINE

## 2024-09-12 PROCEDURE — 83540 ASSAY OF IRON: CPT | Performed by: FAMILY MEDICINE

## 2024-09-12 PROCEDURE — 82947 ASSAY GLUCOSE BLOOD QUANT: CPT | Performed by: FAMILY MEDICINE

## 2024-09-12 PROCEDURE — 36415 COLL VENOUS BLD VENIPUNCTURE: CPT | Performed by: FAMILY MEDICINE

## 2024-09-12 PROCEDURE — 99214 OFFICE O/P EST MOD 30 MIN: CPT | Mod: 25 | Performed by: FAMILY MEDICINE

## 2024-09-12 RX ORDER — BUPROPION HYDROCHLORIDE 150 MG/1
150 TABLET ORAL EVERY MORNING
Qty: 30 TABLET | Refills: 0 | Status: SHIPPED | OUTPATIENT
Start: 2024-09-12

## 2024-09-12 RX ORDER — SUMATRIPTAN 50 MG/1
50 TABLET, FILM COATED ORAL
Qty: 30 TABLET | Refills: 1 | Status: SHIPPED | OUTPATIENT
Start: 2024-09-12

## 2024-09-12 NOTE — PROGRESS NOTES
Preventive Care Visit  [facility], Family Medicine  Sep 12, 2024      Assessment & Plan     Routine general medical examination at a health care facility  - REVIEW OF HEALTH MAINTENANCE PROTOCOL ORDERS  - CBC with platelets  - Iron  - TSH with free T4 reflex  - Vitamin D Deficiency  - Lipid Profile  - Glucose  - Hemoglobin A1c  - Colonoscopy Screening  Referral  - MA Screening Digital Bilateral    Screen for colon cancer: will do colonoscopy    Chronic fatigue: will try bupropion along with lexapro. Will review tests that have been checked and consider whether another specialist consultation with the [facility] or Rheumatology might be worthwhile    Menorrhagia with regular cycle: will plan to replace Paragard IUD with Mirena. Scheduled this appt today.     Migraine without aura and without status migrainosus, not intractable: will try sumatriptan    Family history of diabetes mellitus: checking a1c    Right wrist pain: will try wearing wrist braces during work. If not helping, would refer for Occupational Therapy and/or Orthopedics    Impacted cerumen of right ear: flushed out with warm water by MA.       Patient has been advised of split billing requirements and indicates understanding: Yes        Counseling  Appropriate preventive services were addressed with this patient via screening, questionnaire, or discussion as appropriate for fall prevention, nutrition, physical activity, Tobacco-use cessation, social engagement, weight loss and cognition.  Checklist reviewing preventive services available has been given to the patient.  Reviewed patient's diet, addressing concerns and/or questions.   She is at risk for lack of exercise and has been provided with information to increase physical activity for the benefit of her well-being.   The patient's PHQ-9 score is consistent with mild depression. She was provided with information regarding depression.  "          Subjective   Anahi is a 44 year old, presenting for the following:  Physical        9/12/2024    10:26 AM   Additional Questions   Roomed by GEORGETTE LANDAVERDE MA   Accompanied by SELF         9/12/2024   Forms   Any forms needing to be completed Yes           Health Care Directive  Patient does not have a Health Care Directive or Living Will: Discussed advance care planning with patient; information given to patient to review.    HPI    Lexapro 5 mg, therapy. Also working 30 hours, not 40. These and therapy have improved her anxiety.     Heavier periods than in the past. Last one lasted 10 days.     Fatigue- chronic for the past 5 years. Got a virus in fall 2019 and never has felt the same. Has had fatigue and brain fog ever since. Feels like \"I can never get my body back.\" Used to have a lot of energy. Now, feels exhausted at the end of every day. Doesn't have energy to go out and have fun with family. In 2020, she saw 2 different Rheumatologists and a Neurologist.      headaches once a week. With nausea, sensitivity to light and sound. Takes 2 200 mg ibuprofen but this does not help.     Dad had diabetes, also paternal aunts did.     Ears seem full. Hears a \"whooshing,\" like the sound of the ocean in her right ear.     Gets pain near base of thumbs bilaterally but especially at the right base of her thumb- has worn braces while sleeping but didn't seem to help.     Aneurysm monitoring- has been stable several years, wondering if she can decrease the frequency of MRIs.           9/8/2024   General Health   How would you rate your overall physical health? (!) FAIR   Feel stress (tense, anxious, or unable to sleep) Rather much      (!) STRESS CONCERN      9/8/2024   Nutrition   Three or more servings of calcium each day? Yes   Diet: Regular (no restrictions)   How many servings of fruit and vegetables per day? (!) 2-3   How many sweetened beverages each day? 0-1            9/8/2024   Exercise   Days per week of " moderate/strenous exercise 2 days   Average minutes spent exercising at this level 20 min      (!) EXERCISE CONCERN      9/8/2024   Social Factors   Frequency of gathering with friends or relatives Once a week   Worry food won't last until get money to buy more No   Food not last or not have enough money for food? No   Do you have housing? (Housing is defined as stable permanent housing and does not include staying ouside in a car, in a tent, in an abandoned building, in an overnight shelter, or couch-surfing.) Yes   Are you worried about losing your housing? No   Lack of transportation? No   Unable to get utilities (heat,electricity)? No            9/8/2024   Dental   Dentist two times every year? Yes            5/14/2024   TB Screening   Were you born outside of the [place]? Yes          Today's PHQ-9 Score:       9/8/2024     2:34 PM   PHQ-9 SCORE   PHQ-9 Total Score MyChart 7 (Mild depression)   PHQ-9 Total Score 7         9/8/2024   Substance Use   Alcohol more than 3/day or more than 7/wk Not Applicable   Do you use any other substances recreationally? No        Social History     Tobacco Use    Smoking status: Never     Passive exposure: Never    Smokeless tobacco: Never   Vaping Use    Vaping status: Never Used   Substance Use Topics    Alcohol use: Not Currently    Drug use: Never             9/8/2024   Breast Cancer Screening   Family history of breast, colon, or ovarian cancer? No / Unknown          9/14/2023   LAST FHS-7 RESULTS   1st degree relative breast or ovarian cancer No   Any relative bilateral breast cancer No   Any male have breast cancer No   Any ONE woman have BOTH breast AND ovarian cancer No   Any woman with breast cancer before 50yrs No   2 or more relatives with breast AND/OR ovarian cancer No   2 or more relatives with breast AND/OR bowel cancer No           Mammogram Screening - Mammogram every 1-2 years updated in Health Maintenance based on mutual decision making        9/8/2024   STI  "Screening   New sexual partner(s) since last STI/HIV test? No        History of abnormal Pap smear: No - age 30-64 HPV with reflex Pap every 5 years recommended        Latest Ref Rng & Units 4/24/2023     9:29 AM 2/13/2018    12:00 AM   PAP / HPV   PAP  Negative for Intraepithelial Lesion or Malignancy (NILM)     HPV 16 DNA Negative Negative     HPV 18 DNA Negative Negative     HPV_EXT - HISTORICAL   See Scanned Report    Other HR HPV Negative Negative       ASCVD Risk   The 10-year ASCVD risk score (Vasu COLES, et al., 2019) is: 0.4%    Values used to calculate the score:      Age: 44 years      Sex: Female      Is Non- : No      Diabetic: No      Tobacco smoker: No      Systolic Blood Pressure: 118 mmHg      Is BP treated: No      HDL Cholesterol: 80 mg/dL      Total Cholesterol: 200 mg/dL        9/8/2024   Contraception/Family Planning   Questions about contraception or family planning No           Reviewed and updated as needed this visit by Provider                    Past Medical History:   Diagnosis Date    Adjustment disorder with anxious mood 03/08/2021    Aneurysm (H24)     Anxiety 06/22/2020    Depression          Review of Systems  Constitutional, HEENT, cardiovascular, pulmonary, gi and gu systems are negative, except as otherwise noted.     Objective    Exam  There were no vitals taken for this visit.   Estimated body mass index is 23.04 kg/m  as calculated from the following:    Height as of 3/4/24: 1.676 m (5' 6\").    Weight as of 3/4/24: 64.8 kg (142 lb 12 oz).    Physical Exam  GENERAL: alert and no distress  EYES: Eyes grossly normal to inspection, PERRL and conjunctivae and sclerae normal  HENT: ear canals and TM's normal, nose and mouth without ulcers or lesions  NECK: no adenopathy, no asymmetry, masses, or scars  RESP: lungs clear to auscultation - no rales, rhonchi or wheezes  CV: regular rate and rhythm, normal S1 S2, no S3 or S4, no murmur, click or rub, no " peripheral edema  ABDOMEN: soft, nontender, no hepatosplenomegaly, no masses and bowel sounds normal  MS: no gross musculoskeletal defects noted, no edema  SKIN: no suspicious lesions or rashes  NEURO: Normal strength and tone, mentation intact and speech normal  PSYCH: mentation appears normal, affect normal/bright        Signed Electronically by: Faith Malik MD    Answers submitted by the patient for this visit:  Patient Health Questionnaire (Submitted on 9/8/2024)  If you checked off any problems, how difficult have these problems made it for you to do your work, take care of things at home, or get along with other people?: Somewhat difficult  PHQ9 TOTAL SCORE: 7

## 2024-09-12 NOTE — PATIENT INSTRUCTIONS
Start bupropion (Wellbutrin) daily along with lexapro.       Patient Education   Preventive Care Advice   This is general advice given by our system to help you stay healthy. However, your care team may have specific advice just for you. Please talk to your care team about your preventive care needs.  Nutrition  Eat 5 or more servings of fruits and vegetables each day.  Try wheat bread, brown rice and whole grain pasta (instead of white bread, rice, and pasta).  Get enough calcium and vitamin D. Check the label on foods and aim for 100% of the RDA (recommended daily allowance).  Lifestyle  Exercise at least 150 minutes each week  (30 minutes a day, 5 days a week).  Do muscle strengthening activities 2 days a week. These help control your weight and prevent disease.  No smoking.  Wear sunscreen to prevent skin cancer.  Have a dental exam and cleaning every 6 months.  Yearly exams  See your health care team every year to talk about:  Any changes in your health.  Any medicines your care team has prescribed.  Preventive care, family planning, and ways to prevent chronic diseases.  Shots (vaccines)   HPV shots (up to age 26), if you've never had them before.  Hepatitis B shots (up to age 59), if you've never had them before.  COVID-19 shot: Get this shot when it's due.  Flu shot: Get a flu shot every year.  Tetanus shot: Get a tetanus shot every 10 years.  Pneumococcal, hepatitis A, and RSV shots: Ask your care team if you need these based on your risk.  Shingles shot (for age 50 and up)  General health tests  Diabetes screening:  Starting at age 35, Get screened for diabetes at least every 3 years.  If you are younger than age 35, ask your care team if you should be screened for diabetes.  Cholesterol test: At age 39, start having a cholesterol test every 5 years, or more often if advised.  Bone density scan (DEXA): At age 50, ask your care team if you should have this scan for osteoporosis (brittle bones).  Hepatitis  C: Get tested at least once in your life.  STIs (sexually transmitted infections)  Before age 24: Ask your care team if you should be screened for STIs.  After age 24: Get screened for STIs if you're at risk. You are at risk for STIs (including HIV) if:  You are sexually active with more than one person.  You don't use condoms every time.  You or a partner was diagnosed with a sexually transmitted infection.  If you are at risk for HIV, ask about PrEP medicine to prevent HIV.  Get tested for HIV at least once in your life, whether you are at risk for HIV or not.  Cancer screening tests  Cervical cancer screening: If you have a cervix, begin getting regular cervical cancer screening tests starting at age 21.  Breast cancer scan (mammogram): If you've ever had breasts, begin having regular mammograms starting at age 40. This is a scan to check for breast cancer.  Colon cancer screening: It is important to start screening for colon cancer at age 45.  Have a colonoscopy test every 10 years (or more often if you're at risk) Or, ask your provider about stool tests like a FIT test every year or Cologuard test every 3 years.  To learn more about your testing options, visit:   .  For help making a decision, visit:   https://bit.ly/is72839.  Prostate cancer screening test: If you have a prostate, ask your care team if a prostate cancer screening test (PSA) at age 55 is right for you.  Lung cancer screening: If you are a current or former smoker ages 50 to 80, ask your care team if ongoing lung cancer screenings are right for you.  For informational purposes only. Not to replace the advice of your health care provider. Copyright   2023 [employer]. All rights reserved. Clinically reviewed by the [facility] Transitions Program. [place] 348362 - REV 01/24.  Learning About Stress  What is stress?     Stress is your body's response to a hard situation. Your body can have a physical, emotional, or mental  response. Stress is a fact of life for most people, and it affects everyone differently. What causes stress for you may not be stressful for someone else.  A lot of things can cause stress. You may feel stress when you go on a job interview, take a test, or run a race. This kind of short-term stress is normal and even useful. It can help you if you need to work hard or react quickly. For example, stress can help you finish an important job on time.  Long-term stress is caused by ongoing stressful situations or events. Examples of long-term stress include long-term health problems, ongoing problems at work, or conflicts in your family. Long-term stress can harm your health.  How does stress affect your health?  When you are stressed, your body responds as though you are in danger. It makes hormones that speed up your heart, make you breathe faster, and give you a burst of energy. This is called the fight-or-flight stress response. If the stress is over quickly, your body goes back to normal and no harm is done.  But if stress happens too often or lasts too long, it can have bad effects. Long-term stress can make you more likely to get sick, and it can make symptoms of some diseases worse. If you tense up when you are stressed, you may develop neck, shoulder, or low back pain. Stress is linked to high blood pressure and heart disease.  Stress also harms your emotional health. It can make you calzada, tense, or depressed. Your relationships may suffer, and you may not do well at work or school.  What can you do to manage stress?  You can try these things to help manage stress:   Do something active. Exercise or activity can help reduce stress. Walking is a great way to get started. Even everyday activities such as housecleaning or yard work can help.  Try yoga or suzy chi. These techniques combine exercise and meditation. You may need some training at first to learn them.  Do something you enjoy. For example, listen to  "music or go to a movie. Practice your hobby or do volunteer work.  Meditate. This can help you relax, because you are not worrying about what happened before or what may happen in the future.  Do guided imagery. Imagine yourself in any setting that helps you feel calm. You can use online videos, books, or a teacher to guide you.  Do breathing exercises. For example:  From a standing position, bend forward from the waist with your knees slightly bent. Let your arms dangle close to the floor.  Breathe in slowly and deeply as you return to a standing position. Roll up slowly and lift your head last.  Hold your breath for just a few seconds in the standing position.  Breathe out slowly and bend forward from the waist.  Let your feelings out. Talk, laugh, cry, and express anger when you need to. Talking with supportive friends or family, a counselor, or a sienna leader about your feelings is a healthy way to relieve stress. Avoid discussing your feelings with people who make you feel worse.  Write. It may help to write about things that are bothering you. This helps you find out how much stress you feel and what is causing it. When you know this, you can find better ways to cope.  What can you do to prevent stress?  You might try some of these things to help prevent stress:  Manage your time. This helps you find time to do the things you want and need to do.  Get enough sleep. Your body recovers from the stresses of the day while you are sleeping.  Get support. Your family, friends, and community can make a difference in how you experience stress.  Limit your news feed. Avoid or limit time on social media or news that may make you feel stressed.  Do something active. Exercise or activity can help reduce stress. Walking is a great way to get started.  Where can you learn more?  Go to https://www.healthwise.net/patiented  Enter N032 in the search box to learn more about \"Learning About Stress.\"  Current as of: October 24, " 2023               Content Version: 14.0    8610-3523 [employer].   Care instructions adapted under license by your healthcare professional. If you have questions about a medical condition or this instruction, always ask your healthcare professional. [employer] disclaims any warranty or liability for your use of this information.      Learning About Depression Screening  What is depression screening?  Depression screening is a way to see if you have depression symptoms. It may be done by a doctor or counselor. It's often part of a routine checkup. That's because your mental health is just as important as your physical health.  Depression is a mental health condition that affects how you feel, think, and act. You may:  Have less energy.  Lose interest in your daily activities.  Feel sad and grouchy for a long time.  Depression is very common. It affects people of all ages.  Many things can lead to depression. Some people become depressed after they have a stroke or find out they have a major illness like cancer or heart disease. The death of a loved one or a breakup may lead to depression. It can run in families. Most experts believe that a combination of inherited genes and stressful life events can cause it.  What happens during screening?  You may be asked to fill out a form about your depression symptoms. You and the doctor will discuss your answers. The doctor may ask you more questions to learn more about how you think, act, and feel.  What happens after screening?  If you have symptoms of depression, your doctor will talk to you about your options.  Doctors usually treat depression with medicines or counseling. Often, combining the two works best. Many people don't get help because they think that they'll get over the depression on their own. But people with depression may not get better unless they get treatment.  The cause of depression is not well understood. There may be many  "factors involved. But if you have depression, it's not your fault.  A serious symptom of depression is thinking about death or suicide. If you or someone you care about talks about this or about feeling hopeless, get help right away.  It's important to know that depression can be treated. Medicine, counseling, and self-care may help.  Where can you learn more?  Go to https://www.[employer].net/patiented  Enter T185 in the search box to learn more about \"Learning About Depression Screening.\"  Current as of: June 24, 2023  Content Version: 14.1 2006-2024 [employer].   Care instructions adapted under license by your healthcare professional. If you have questions about a medical condition or this instruction, always ask your healthcare professional. [employer] disclaims any warranty or liability for your use of this information.       "

## 2024-09-16 ENCOUNTER — MYC MEDICAL ADVICE (OUTPATIENT)
Dept: FAMILY MEDICINE | Facility: CLINIC | Age: 44
End: 2024-09-16
Payer: COMMERCIAL

## 2024-09-16 DIAGNOSIS — R53.82 CHRONIC FATIGUE: Primary | ICD-10-CM

## 2024-09-16 DIAGNOSIS — Z12.11 COLON CANCER SCREENING: ICD-10-CM

## 2024-09-17 ENCOUNTER — TELEPHONE (OUTPATIENT)
Dept: FAMILY MEDICINE | Facility: CLINIC | Age: 44
End: 2024-09-17
Payer: COMMERCIAL

## 2024-09-17 ENCOUNTER — ANCILLARY PROCEDURE (OUTPATIENT)
Dept: MAMMOGRAPHY | Facility: CLINIC | Age: 44
End: 2024-09-17
Attending: FAMILY MEDICINE
Payer: COMMERCIAL

## 2024-09-17 DIAGNOSIS — N64.89 BREAST ASYMMETRY: ICD-10-CM

## 2024-09-17 PROCEDURE — 77062 BREAST TOMOSYNTHESIS BI: CPT

## 2024-09-17 PROCEDURE — 76642 ULTRASOUND BREAST LIMITED: CPT | Mod: 50

## 2024-09-17 NOTE — TELEPHONE ENCOUNTER
Forms/Letter Request    Type of form/letter: OTHER: Physician Results Form        Do we have the form/letter: Yes: Paper copy    Who is the form from? Patient    Where did/will the form come from? Patient or family brought in       When is form/letter needed by: ASAP     How would you like the form/letter returned: Fax : [phone]    Patient Notified form requests are processed in 5-7 business days:Yes

## 2024-09-17 NOTE — TELEPHONE ENCOUNTER
Form reviewed, completed, and signed by Dr. Malik.     Returned via fax to [employer] at fax [phone].     Copied to EHR scanning.

## 2024-09-18 ENCOUNTER — ORDERS ONLY (AUTO-RELEASED) (OUTPATIENT)
Dept: FAMILY MEDICINE | Facility: CLINIC | Age: 44
End: 2024-09-18
Payer: COMMERCIAL

## 2024-09-18 DIAGNOSIS — Z12.11 COLON CANCER SCREENING: ICD-10-CM

## 2024-09-28 ENCOUNTER — IMMUNIZATION (OUTPATIENT)
Dept: FAMILY MEDICINE | Facility: CLINIC | Age: 44
End: 2024-09-28
Payer: COMMERCIAL

## 2024-09-28 PROCEDURE — 90471 IMMUNIZATION ADMIN: CPT

## 2024-09-28 PROCEDURE — 90673 RIV3 VACCINE NO PRESERV IM: CPT

## 2024-10-03 ENCOUNTER — LAB (OUTPATIENT)
Dept: LAB | Facility: CLINIC | Age: 44
End: 2024-10-03
Payer: COMMERCIAL

## 2024-10-03 DIAGNOSIS — R53.82 CHRONIC FATIGUE: ICD-10-CM

## 2024-10-03 PROCEDURE — 36415 COLL VENOUS BLD VENIPUNCTURE: CPT

## 2024-10-03 PROCEDURE — 86665 EPSTEIN-BARR CAPSID VCA: CPT

## 2024-10-03 PROCEDURE — 86644 CMV ANTIBODY: CPT

## 2024-10-03 PROCEDURE — 99000 SPECIMEN HANDLING OFFICE-LAB: CPT

## 2024-10-03 PROCEDURE — 86769 SARS-COV-2 COVID-19 ANTIBODY: CPT | Mod: 90

## 2024-10-04 LAB
CMV IGG SERPL IA-ACNC: 8.3 U/ML
CMV IGG SERPL IA-ACNC: ABNORMAL
EBV VCA IGG SER IA-ACNC: <10 U/ML
EBV VCA IGG SER IA-ACNC: NORMAL

## 2024-10-07 LAB — SARS-COV-2 IGG SERPL QL IA: POSITIVE

## 2024-10-08 ENCOUNTER — MYC MEDICAL ADVICE (OUTPATIENT)
Dept: FAMILY MEDICINE | Facility: CLINIC | Age: 44
End: 2024-10-08
Payer: COMMERCIAL

## 2024-10-08 DIAGNOSIS — F43.22 ADJUSTMENT DISORDER WITH ANXIOUS MOOD: ICD-10-CM

## 2024-10-08 DIAGNOSIS — R53.82 CHRONIC FATIGUE: Primary | ICD-10-CM

## 2024-10-09 ASSESSMENT — ANXIETY QUESTIONNAIRES
IF YOU CHECKED OFF ANY PROBLEMS ON THIS QUESTIONNAIRE, HOW DIFFICULT HAVE THESE PROBLEMS MADE IT FOR YOU TO DO YOUR WORK, TAKE CARE OF THINGS AT HOME, OR GET ALONG WITH OTHER PEOPLE: VERY DIFFICULT
8. IF YOU CHECKED OFF ANY PROBLEMS, HOW DIFFICULT HAVE THESE MADE IT FOR YOU TO DO YOUR WORK, TAKE CARE OF THINGS AT HOME, OR GET ALONG WITH OTHER PEOPLE?: VERY DIFFICULT
GAD7 TOTAL SCORE: 14
1. FEELING NERVOUS, ANXIOUS, OR ON EDGE: NEARLY EVERY DAY
5. BEING SO RESTLESS THAT IT IS HARD TO SIT STILL: NOT AT ALL
2. NOT BEING ABLE TO STOP OR CONTROL WORRYING: NEARLY EVERY DAY
6. BECOMING EASILY ANNOYED OR IRRITABLE: SEVERAL DAYS
7. FEELING AFRAID AS IF SOMETHING AWFUL MIGHT HAPPEN: SEVERAL DAYS
GAD7 TOTAL SCORE: 14
7. FEELING AFRAID AS IF SOMETHING AWFUL MIGHT HAPPEN: SEVERAL DAYS
3. WORRYING TOO MUCH ABOUT DIFFERENT THINGS: NEARLY EVERY DAY
4. TROUBLE RELAXING: NEARLY EVERY DAY
GAD7 TOTAL SCORE: 14

## 2024-10-09 ASSESSMENT — PATIENT HEALTH QUESTIONNAIRE - PHQ9
SUM OF ALL RESPONSES TO PHQ QUESTIONS 1-9: 13
SUM OF ALL RESPONSES TO PHQ QUESTIONS 1-9: 13
10. IF YOU CHECKED OFF ANY PROBLEMS, HOW DIFFICULT HAVE THESE PROBLEMS MADE IT FOR YOU TO DO YOUR WORK, TAKE CARE OF THINGS AT HOME, OR GET ALONG WITH OTHER PEOPLE: VERY DIFFICULT

## 2024-10-10 ENCOUNTER — VIRTUAL VISIT (OUTPATIENT)
Dept: BEHAVIORAL HEALTH | Facility: CLINIC | Age: 44
End: 2024-10-10
Payer: COMMERCIAL

## 2024-10-10 DIAGNOSIS — F41.1 GENERALIZED ANXIETY DISORDER: Primary | ICD-10-CM

## 2024-10-10 PROCEDURE — 90837 PSYTX W PT 60 MINUTES: CPT | Mod: 95 | Performed by: SOCIAL WORKER

## 2024-10-10 NOTE — PROGRESS NOTES
[facility]                                     Progress Note    Patient Name: Anahi Stockton  Date: 10/10/2024         Service Type: Individual      Session Start Time: 10:00am  Session End Time: 10:53am     Session Length: 53 minutes    Session #: 5    Attendees: Client attended alone    Service Modality:  Video Visit:      Provider verified identity through the following two step process.  Patient provided:  Patient is known previously to provider    Telemedicine Visit: The patient's condition can be safely assessed and treated via synchronous audio and visual telemedicine encounter.      Reason for Telemedicine Visit: Patient has requested telehealth visit    Originating Site (Patient Location): Patient's home    Distant Site (Provider Location): Provider Remote Setting- Home Office    Consent:  The patient/guardian has verbally consented to: the potential risks and benefits of telemedicine (video visit) versus in person care; bill my insurance or make self-payment for services provided; and responsibility for payment of non-covered services.     Patient would like the video invitation sent by:  My Chart    Mode of Communication:  Video Conference via [facility]    Distant Location (Provider):  Off-site    As the provider I attest to compliance with applicable laws and regulations related to telemedicine.    DATA  Extended Session (53+ minutes): PROLONGED SERVICE IN THE OUTPATIENT SETTING REQUIRING DIRECT (FACE-TO-FACE) PATIENT CONTACT BEYOND THE USUAL SERVICE:    - Treatment protocol required additional time to complete, due to the nature of diagnosis being treated.  See Interventions section for details  Interactive Complexity: No  Crisis: No        Progress Since Last Session (Related to Symptoms / Goals / Homework):   Symptoms: No change anxiety    Homework: Achieved / completed to satisfaction      Episode of Care Goals: Satisfactory progress - ACTION (Actively working towards change);  "Intervened by reinforcing change plan / affirming steps taken     Current / Ongoing Stressors and Concerns:   Patient provides a status update while therapist utilizes reflective listening skills. She reports that she has been struggling with anxiety and feeling sad. She processes recent stressors: abnormal mammogram (turned out to be fine),  tested positive for COVID, daughter's infected knee. She describes ongoing problems with chronic fatigue and is working with her doctor to address this issue. Patient wonders about adapting to a new normal, \"this is the amount of energy that I have.\" She reports that she often dreads starting each day because it's not enjoyable. \"When I push myself to do things, I feel overwhelmed and drained.\" She reports not feeling good about her weight or not feeling very good in her body.        Treatment Objective(s) Addressed in This Session:   identify the fears / thoughts that contribute to feeling anxious  Gain insight      Intervention:   Discussed mindfulness as being aware of what we are experiencing while we are experiencing it.  Contrasted this with avoidance and rumination.  Explored the role of mindfulness as an overall coping strategy for managing depression, anxiety, and strong emotions.  Led client in a guided mindfulness exercise focusing on sensations, images, feelings, and thoughts.  Discussed mindfulness as a tool to intentionally shift our awareness and focus as needed. Discussed importance of taking breaks and restorative practices. Reviewed thought re-framing practice and importance of writing it out using following prompt:  Journal prompt:  -Write down the worry  For example: someone in my family will get sick again  -Identify and validate the emotion  For example: I am feeling scared and sad because I love my family so much   -Offer a re-frame and statement of compassion  For example: It's okay to feel scared; I'm still going to keep living my life in love " and know that I can handle any problem when it happens   -Remember the evidence: you have persevered through struggles in the past      Assessments completed prior to visit:  The following assessments were completed by patient for this visit:  PHQ9:       3/3/2024     9:51 AM 3/4/2024     8:36 AM 4/4/2024     8:43 AM 5/14/2024     9:49 PM 7/17/2024     7:03 PM 9/8/2024     2:34 PM 10/9/2024     7:11 PM   PHQ-9 SCORE   PHQ-9 Total Score MyChart 19 (Moderately severe depression)  7 (Mild depression) 7 (Mild depression) 11 (Moderate depression) 7 (Mild depression) 13 (Moderate depression)   PHQ-9 Total Score 19 19 7 7    7 11 7 13     GAD7:       3/3/2024     9:56 AM 4/1/2024     9:34 AM 4/4/2024     8:43 AM 5/2/2024    10:49 AM 5/22/2024     2:34 PM 7/17/2024     7:03 PM 10/9/2024     7:12 PM   MING-7 SCORE   Total Score 21 (severe anxiety) 16 (severe anxiety) 6 (mild anxiety) 11 (moderate anxiety) 9 (mild anxiety) 7 (mild anxiety) 14 (moderate anxiety)   Total Score 21 16 6 11 9 7 14     PROMIS 10-Global Health (only subscores and total score):       4/4/2024     8:39 AM 5/2/2024    10:51 AM 7/17/2024     6:59 PM   PROMIS-10 Scores Only   Global Mental Health Score 13 12 11   Global Physical Health Score 12 12 12   PROMIS TOTAL - SUBSCORES 25 24 23         ASSESSMENT: Current Emotional / Mental Status (status of significant symptoms):   Risk status (Self / Other harm or suicidal ideation)   Patient denies current fears or concerns for personal safety.   Patient denies current or recent suicidal ideation or behaviors.   Patient denies current or recent homicidal ideation or behaviors.   Patient denies current or recent self injurious behavior or ideation.   Patient denies other safety concerns.   Patient reports there has been no change in risk factors since their last session.     Patient reports there has been no change in protective factors since their last session.     Recommended that patient call 911 or go to the  local ED should there be a change in any of these risk factors.     Appearance:   Appropriate    Eye Contact:   Good    Psychomotor Behavior: Normal    Attitude:   Cooperative    Orientation:   All   Speech    Rate / Production: Normal/ Responsive Normal     Volume:  Normal    Mood:    Anxious  Depressed    Affect:    Appropriate  Worrisome    Thought Content:  Clear    Thought Form:  Coherent  Logical    Insight:    Good      Medication Review:   No changes to current psychiatric medication(s)     Medication Compliance:   Yes     Changes in Health Issues:   None reported     Chemical Use Review:   Substance Use: Chemical use reviewed, no active concerns identified      Tobacco Use: No current tobacco use.      Diagnosis:  1. Generalized anxiety disorder    R/O MDD    Collateral Reports Completed:   Not Applicable    PLAN: (Patient Tasks / Therapist Tasks / Other)  Return for a virtual visit in 1 month  Patient encouraged to practice grounding techniques   Patient encouraged to try guided meditation   Patient encouraged to practice mindfulness skills  Patient encouraged to practice self-compassion  Patient encouraged to consider a gratitude journal at night    There has been demonstrated improvement in functioning while patient has been engaged in psychotherapy/psychological service- if withdrawn the patient would deteriorate and/or relapse.           Carolyne Figueredo, [facility]                                                         ______________________________________________________________________    Individual Treatment Plan    Patient's Name: Anahi Stockton  YOB: 1980    Date of Creation: 5/2/2024  Date Treatment Plan Last Reviewed/Revised: 7/17/2024    DSM5 Diagnoses: 300.02 (F41.1) Generalized Anxiety Disorder  Psychosocial / Contextual Factors: 44 year old female, , mother of 2 girls  PROMIS (reviewed every 90 days):   PROMIS 10-Global Health (only subscores and total score):        4/4/2024     8:39 AM 5/2/2024    10:51 AM 7/17/2024     6:59 PM   PROMIS-10 Scores Only   Global Mental Health Score 13 12 11   Global Physical Health Score 12 12 12   PROMIS TOTAL - SUBSCORES 25 24 23       Referral / Collaboration:  Referral to another professional/service is not indicated at this time..    Anticipated number of session for this episode of care: 3-6 sessions  Anticipation frequency of session: Monthly  Anticipated Duration of each session: 53 or more minutes  Treatment plan will be reviewed in 90 days or when goals have been changed.       MeasurableTreatment Goal(s) related to diagnosis / functional impairment(s)  Goal 1: Patient will manage symptoms of anxiety as evidenced by a MING-7 score of 5 or lower    I will know I've met my goal when I feel more confident.      Objective #A (Patient Action)    Patient will identify the fears / thoughts that contribute to feeling anxious.  Status: Continued - Date(s): 7/17/2024     Intervention(s)  Therapist will teach emotional recognition/identification.   .    Objective #B  Patient will use relaxation strategies multiple times per day to reduce the physical symptoms of anxiety.  Status: Continued - Date(s): 7/17/2024     Intervention(s)  Therapist will  teach relaxation strategies .    Objective #C  Patient will use thought-stopping strategy daily to reduce intrusive thoughts.  Status: Continued - Date(s): 7/17/2024     Intervention(s)  Therapist will  teach thought stopping and other CBT strategies to manage thoughts .      Patient has reviewed and agreed to the above plan.      Carolyne Figueredo, [facility]  July 17, 2024

## 2024-10-11 RX ORDER — METHYLPHENIDATE HYDROCHLORIDE 5 MG/1
5 TABLET ORAL DAILY
Qty: 30 TABLET | Refills: 0 | Status: SHIPPED | OUTPATIENT
Start: 2024-10-11

## 2024-10-11 RX ORDER — ESCITALOPRAM OXALATE 5 MG/1
5 TABLET ORAL DAILY
Qty: 90 TABLET | Refills: 3 | Status: SHIPPED | OUTPATIENT
Start: 2024-10-11

## 2024-10-15 ENCOUNTER — TELEPHONE (OUTPATIENT)
Dept: FAMILY MEDICINE | Facility: CLINIC | Age: 44
End: 2024-10-15
Payer: COMMERCIAL

## 2024-10-15 LAB — NONINV COLON CA DNA+OCC BLD SCRN STL QL: NEGATIVE

## 2024-10-15 NOTE — TELEPHONE ENCOUNTER
Prior Authorization Retail Medication Request    Medication/Dose: methylphenidate (RITALIN) 5 MG tablet   Diagnosis and ICD code (if different than what is on RX): Chronic fatigue [R53.82]    New/renewal/insurance change PA/secondary ins. PA: New  Previously Tried and Failed:  Unknown   Rationale:  Unknown     Insurance   Primary: [employer] CORE   Insurance ID: [id number]      Secondary (if applicable): [facility] NATIONAL   Insurance ID: [id number]      Clinic Information  Preferred routing pool for dept communication: [facility] Pool

## 2024-10-17 NOTE — TELEPHONE ENCOUNTER
Central Prior Authorization Team   Phone: [phone]    PA Initiation    Medication: methylphenidate (RITALIN) 5 MG tablet   Insurance Company: [employer] Non-Specialty PA's - Phone [phone] Fax [phone]  Pharmacy Filling the Rx: [id number] [place], [place] - [facility] [facility]  AT [facility]  Filling Pharmacy Phone: [phone]  Filling Pharmacy Fax:    Start Date: 10/17/2024

## 2024-10-18 NOTE — TELEPHONE ENCOUNTER
Prior Authorization Approval    Authorization Effective Date: 9/17/2024  Authorization Expiration Date: 10/17/2025  Medication: methylphenidate (RITALIN) 5 MG tablet   Reference #:     Insurance Company: [employer] Non-Specialty PA's - Phone [phone] Fax [phone]  Which Pharmacy is filling the prescription (Not needed for infusion/clinic administered): [facility] #[id number] [address]  AT [facility]  Pharmacy Notified: Yes  Patient Notified: Instructed pharmacy to notify patient when script is ready to /ship.

## 2024-11-06 SDOH — SOCIAL STABILITY: SOCIAL NETWORK: I HAVE TROUBLE DOING ALL OF THE FAMILY ACTIVITIES THAT I WANT TO DO: USUALLY

## 2024-11-06 SDOH — SOCIAL STABILITY: SOCIAL NETWORK: I HAVE TROUBLE DOING ALL OF MY REGULAR LEISURE ACTIVITIES WITH OTHERS: USUALLY

## 2024-11-06 SDOH — SOCIAL STABILITY: SOCIAL NETWORK: I HAVE TROUBLE DOING ALL OF MY USUAL WORK (INCLUDE WORK AT HOME): USUALLY

## 2024-11-06 SDOH — SOCIAL STABILITY: SOCIAL NETWORK

## 2024-11-06 SDOH — SOCIAL STABILITY: SOCIAL NETWORK: PROMIS ABILITY TO PARTICIPATE IN SOCIAL ROLES & ACTIVITIES T-SCORE: 39

## 2024-11-06 SDOH — SOCIAL STABILITY: SOCIAL NETWORK: I HAVE TROUBLE DOING ALL OF THE ACTIVITIES WITH FRIENDS THAT I WANT TO DO: USUALLY

## 2024-11-06 ASSESSMENT — ANXIETY QUESTIONNAIRES
GAD7 TOTAL SCORE: 9
IF YOU CHECKED OFF ANY PROBLEMS ON THIS QUESTIONNAIRE, HOW DIFFICULT HAVE THESE PROBLEMS MADE IT FOR YOU TO DO YOUR WORK, TAKE CARE OF THINGS AT HOME, OR GET ALONG WITH OTHER PEOPLE: VERY DIFFICULT
7. FEELING AFRAID AS IF SOMETHING AWFUL MIGHT HAPPEN: SEVERAL DAYS
4. TROUBLE RELAXING: MORE THAN HALF THE DAYS
1. FEELING NERVOUS, ANXIOUS, OR ON EDGE: SEVERAL DAYS
5. BEING SO RESTLESS THAT IT IS HARD TO SIT STILL: NOT AT ALL
GAD7 TOTAL SCORE: 9
3. WORRYING TOO MUCH ABOUT DIFFERENT THINGS: MORE THAN HALF THE DAYS
2. NOT BEING ABLE TO STOP OR CONTROL WORRYING: MORE THAN HALF THE DAYS
GAD7 TOTAL SCORE: 9
8. IF YOU CHECKED OFF ANY PROBLEMS, HOW DIFFICULT HAVE THESE MADE IT FOR YOU TO DO YOUR WORK, TAKE CARE OF THINGS AT HOME, OR GET ALONG WITH OTHER PEOPLE?: VERY DIFFICULT
6. BECOMING EASILY ANNOYED OR IRRITABLE: SEVERAL DAYS
7. FEELING AFRAID AS IF SOMETHING AWFUL MIGHT HAPPEN: SEVERAL DAYS

## 2024-11-06 ASSESSMENT — PATIENT HEALTH QUESTIONNAIRE - PHQ9
10. IF YOU CHECKED OFF ANY PROBLEMS, HOW DIFFICULT HAVE THESE PROBLEMS MADE IT FOR YOU TO DO YOUR WORK, TAKE CARE OF THINGS AT HOME, OR GET ALONG WITH OTHER PEOPLE: VERY DIFFICULT
SUM OF ALL RESPONSES TO PHQ QUESTIONS 1-9: 15
SUM OF ALL RESPONSES TO PHQ QUESTIONS 1-9: 15

## 2024-11-07 ENCOUNTER — VIRTUAL VISIT (OUTPATIENT)
Dept: PHYSICAL MEDICINE AND REHAB | Facility: CLINIC | Age: 44
End: 2024-11-07
Payer: COMMERCIAL

## 2024-11-07 VITALS — HEIGHT: 66 IN | WEIGHT: 142 LBS | BODY MASS INDEX: 22.82 KG/M2

## 2024-11-07 DIAGNOSIS — R53.82 CHRONIC FATIGUE: ICD-10-CM

## 2024-11-07 DIAGNOSIS — G93.31 POST VIRAL SYNDROME: Primary | ICD-10-CM

## 2024-11-07 PROCEDURE — 99203 OFFICE O/P NEW LOW 30 MIN: CPT | Mod: 95 | Performed by: PHYSICIAN ASSISTANT

## 2024-11-07 ASSESSMENT — PAIN SCALES - GENERAL: PAINLEVEL_OUTOF10: NO PAIN (0)

## 2024-11-07 NOTE — PATIENT INSTRUCTIONS
To Do:   Labs   Occupational therapy  Consider Low dose naltrexone 1mg or COQ10 100mg 2x daily  Follow up 4 weeks.     Post COVID Self Care Suggestions:     Fatigue Management:       https://www.archives-pmr.org/action/showPdf?mcy=-[email or web address]%2819%5709685-0       Self Care:      [email or web address]  Recovery World Health Organization:    https://apps.who.int/iris/bitstream/handle/92650/902759/XQZ-CHBY-0360-045-61263-[address]-eng.pdf  Breathing exercises:    [email or web address]://[email or web address]/health/conditions-and-diseases/coronavirus/coronavirus-recovery-breathing-exercises      Assessment of sense of smell and taste    Smell training:  Flowery - Robyn  Fruity - Lemon  Spicy - Cloves  Resinous - Eucalyptus      1 - Pour a few droplets of one of the oils on to a cotton pad or ball  2 - Do not try to sniff the pad immediately; leave it for a few minutes for the fragrance to develop  3 - Hold the first stick/pad up to your nose, about an inch away.  The order in which you test the oils does not matter  4 - Relax and try to inhale naturally through the nose - sniffing too quickly and deeply is likely to result in you not being able to detect anything  5 - Try this a couple more times, then rest for five minutes  6 - Move on to the next oil and repeat as above.    After three months switch to a new set of odors: menthol, thyme, tangerine, and anna, training with them twice daily.    After another three months, switch to a third new set of odors: green tea, bergamot, rosemary, and danielle, again training with them twice daily.    Studies:   Coral Paxlovid Jesse  [email or web address].cor[employer].[email or web address]/[email or web address]/research/paxalexandra-study/?mibextid=Zxz2cZ    Cognitive Post-COVID study  z.[facility]/covid-ema    Supplements:  Fatigue- COQ10 100mg 3x day  ( side effects GI)  Brain fog-N-acetylcysteine 600 mg daily (side effects GI)

## 2024-11-07 NOTE — LETTER
11/7/2024       RE: Anahi Stockton  [address] [place]  [facility] [address]     Dear Colleague,    Thank you for referring your patient, Anahi Stockton, to the [facility] PHYSICAL MEDICINE AND REHABILITATION CLINIC [place] at [facility]. Please see a copy of my visit note below.    Anahi Stockton is a 44 year old female who presents to be evaluated for a billable video visit.    Video-Visit Details    Video visit Start time: 12:42 PM    Type of service:  Video Visit    Video End Time: 1:06 PM    Originating Location (pt. Location): Home    Distant Location (provider location):  Off- Site    Platform used for Video Visit: Butlr    Assessment/ Impression:   1. Chronic fatigue  Patient with significant chronic fatigue since viral infection October 2019.  Discussed due to increased somnolence upon waking she may require sleep medicine evaluation and to monitor sleep.  Discussed looking for underlying causes such as anemia.  Will check ferritin and B12. Discussed energy conservation and provided information on fatigue management.  Also discussed referral to Occupational therapy for the COVID-19 program.  Lastly discussed either co-Q10 100 mg twice a day with side effects of GI upset for energy.  Discussed also low-dose naltrexone due to history of fibromyalgia starting at 1 mg daily.  Discussed side effects of anxiety, sleep disturbance, and GI upset.  Patient wishes to research medications more and will let me know which 1 she prefers.  - Adult Physical Medicine and Rehab  Referral  - Ferritin; Future  - Vitamin B12; Future  - Occupational Therapy  Referral; Future    2. Post viral syndrome (Primary)  Discussed COVID and Post COVID with patient.  Educational materials provided and all questions answered.  As symptoms started prior to COVID believe this is more postviral syndrome.  Patient did test high for CMV IgG and discussed symptoms of CMV  "infection.  Discussed that with postviral fatigue along with fibromyalgia she would benefit from occupational therapy as above.        Plan:  I reviewed present knowledge on long-Covid.  Education was provided and question were answered.  Orders/Referrals as above  Will advised patient on test results  I will follow up with Anahi Stockton in 5 weeks. I will review progress and consider need for any other therapeutic interventions. If there are any questions and/or concerns she will call the clinic.      On day of encounter time spent in chart review and with patient in consultation, exam, education, coordination of care, review of outside charts/data and documentation:  35 minutes     I have attempted to proof read for major spelling errors and apologize for any minor errors I may have missed.      This note was dictated using voice recognition software. Any grammatical or context distortions are unintentional and inherent to the software.    _____________  Amisha Gautam PA-C  [facility] PHYSICAL MEDICINE AND REHABILITATION CLINIC [place]    Subjective   This 44 year old female presents to the [facility] Rehabilitation Medicine Post-COVID clinic as a new consult to evaluate continuing symptoms after COVID infection initially diagnosed 8/23/23.  Anahi Stockton presented to their primary care physician on 8/23/23 complaining of  Sore throat, fever, generalized aches and pains, fatigue, and weakness. Treatment was Paxlovid.   Prior to above COVID in fall of 2019 had a \"bad Cold\" that took her a month to recover. Anhai Stockton experienced complications of fatigue.  A few weeks after she got tingling/weakness in hands and legs.  In December of 2019 and was admitted to the hospital for evaluation of acute right side weakness and blurred vision. She was found to have an incidental Left MCA trifurcation aneurysm. . She was also getting headaches.   She was seen and had testing done through " Neurology and Rheumatology and was ultimately diagnosed with Fibromyalgia. She has trailed several medications but was unable to continue due to side effects.  She is tired when she wakes up and will feel worse by lunch. She has some energy and then will feel exhausted by end of day. She did decrease work to 30 hours.   She does have some nights were she wakes up at 2am.  She does have a history of snoring.   She has enough energy to make it through work but is not present at night due to her significant fatigue. Past medical history is significant for  Allergic Rhinitis . The patient was vaccinated against COVID x5, last vaccine 11/20/23 .  Previous activity was full time work.  [employer]    History of COVID-19 infection: 8/2023  Date of first symptoms: 8/2023  Diagnosis: antigen  Hospitalization: No  Treatment: Paxlovid  Current Symptoms: See subjective  Goals of Care: increase energy and improve quality of life          11/6/2024     7:19 PM   PHQ Assesment Total Score(s)   PHQ-9 Score 15        Patient-reported           11/6/2024     7:20 PM   MING-7 Results   MING 7 TOTAL SCORE 9 (mild anxiety)   MING-7 Total Score 9        Patient-reported         11/6/2024     7:21 PM   PTSD Screen Score   Have you ever experienced this kind of event? No        Patient-reported         11/6/2024     7:25 PM   PROMIS-29   PROMIS Physical Function T-Score 38 (moderate dysfunction)   PROMIS Anxiety T-Score 67 (moderate)   PROMIS Depression T-Score 62 (moderate)   PROMIS Fatigue T-Score 69 (moderate)   PROMIS Sleep Disturbance T-Score 54 (within normal limits)   PROMIS Ability to Participate in Social Roles & Activities T-Score 39 (moderate dysfunction)   PROMIS Pain Interference T-Score 65 (moderate)   PROMIS Pain Intensity 3       Past Medical History:   Diagnosis Date     Adjustment disorder with anxious mood 03/08/2021     Aneurysm (H)      Anxiety 06/22/2020     Depression        No past surgical history on  file.    Family History   Problem Relation Age of Onset     Hypertension Mother      Diabetes Father      Cerebrovascular Disease Father      Hypertension Father      Depression Brother      Breast Cancer No family hx of      Ovarian Cancer No family hx of        Social History     Tobacco Use     Smoking status: Never     Passive exposure: Never     Smokeless tobacco: Never   Vaping Use     Vaping status: Never Used   Substance Use Topics     Alcohol use: Not Currently     Drug use: Never         Current Outpatient Medications:      acetaminophen (TYLENOL) 500 MG tablet, [ACETAMINOPHEN (TYLENOL) 500 MG TABLET] Take 500 mg by mouth every 6 (six) hours as needed for pain., Disp: , Rfl:      escitalopram (LEXAPRO) 5 MG tablet, Take 1 tablet (5 mg) by mouth daily., Disp: 90 tablet, Rfl: 3     ibuprofen (ADVIL/MOTRIN) 200 MG tablet, Take 200-400 mg by mouth every 6 hours as needed, Disp: , Rfl:      methylphenidate (RITALIN) 5 MG tablet, Take 1 tablet (5 mg) by mouth daily., Disp: 30 tablet, Rfl: 0     multivitamin therapeutic tablet, [MULTIVITAMIN THERAPEUTIC TABLET] Take 1 tablet by mouth daily., Disp: , Rfl:      SUMAtriptan (IMITREX) 50 MG tablet, Take 1 tablet (50 mg) by mouth at onset of headache for migraine. May repeat in 2 hours. Max 4 tablets/24 hours., Disp: 30 tablet, Rfl: 1     tretinoin (RETIN-A) 0.05 % external cream, Apply topically At Bedtime, Disp: 45 g, Rfl: 3     vitamin D3 (CHOLECALCIFEROL) 50 mcg (2000 units) tablet, Take 1 tablet by mouth daily, Disp: , Rfl:     Review of Systems   Constitutional, HEENT, cardiovascular, pulmonary, GI, , musculoskeletal, neuro, skin, endocrine and psych systems are negative, except as otherwise noted.      Objective    Vitals:  No vitals were obtained today due to virtual visit.    Physical Exam   EYES: Eyes grossly normal to inspection.  No discharge or erythema, or obvious scleral/conjunctival abnormalities.  SKIN: Visible skin clear. No significant rash,  "abnormal pigmentation or lesions.  NEURO: Cranial nerves grossly intact.  Mentation and speech appropriate for age.  GENERAL: Healthy, alert and no distress  RESP: No audible wheeze, cough, or visible cyanosis.  No visible retractions or increased work of breathing.    PSYCH: Mentation appears normal, affect normal/bright, judgement and insight intact, normal speech and appearance well-groomed.            CRP   Date Value Ref Range Status   11/23/2020 <0.1 0.0 - 0.8 mg/dL Final      Erythrocyte Sedimentation Rate   Date Value Ref Range Status   11/23/2020 7 0 - 20 mm/hr Final      Last Renal Panel:  Sodium   Date Value Ref Range Status   12/20/2019 141 136 - 145 mmol/L Final     Potassium   Date Value Ref Range Status   12/20/2019 3.7 3.5 - 5.0 mmol/L Final     Chloride   Date Value Ref Range Status   12/20/2019 108 (H) 98 - 107 mmol/L Final     Carbon Dioxide (CO2)   Date Value Ref Range Status   12/20/2019 21 (L) 22 - 31 mmol/L Final     Anion Gap   Date Value Ref Range Status   12/20/2019 12 5 - 18 mmol/L Final     Glucose   Date Value Ref Range Status   09/12/2024 86 70 - 99 mg/dL Final   04/19/2022 84 70 - 125 mg/dL Final     Urea Nitrogen   Date Value Ref Range Status   12/20/2019 14 8 - 22 mg/dL Final     Creatinine   Date Value Ref Range Status   11/23/2020 0.73 0.60 - 1.10 mg/dL Final     GFR Estimate   Date Value Ref Range Status   11/23/2020 >60 >60 mL/min/1.73m2 Final     Calcium   Date Value Ref Range Status   11/23/2020 8.9 8.5 - 10.5 mg/dL Final     Albumin   Date Value Ref Range Status   11/23/2020 4.1 3.5 - 5.0 g/dL Final      Lab Results   Component Value Date    WBC 4.9 09/12/2024     Lab Results   Component Value Date    RBC 4.60 09/12/2024     Lab Results   Component Value Date    HGB 13.9 09/12/2024     Lab Results   Component Value Date    HCT 40.8 09/12/2024     No components found for: \"MCT\"  Lab Results   Component Value Date    MCV 89 09/12/2024     Lab Results   Component Value Date    " MCH 30.2 09/12/2024     Lab Results   Component Value Date    MCHC 34.1 09/12/2024     Lab Results   Component Value Date    RDW 11.7 09/12/2024     Lab Results   Component Value Date     09/12/2024      Lab Results   Component Value Date    A1C 5.0 09/12/2024      TSH   Date Value Ref Range Status   09/12/2024 2.54 0.30 - 4.20 uIU/mL Final   12/20/2019 2.32 0.30 - 5.00 uIU/mL Final      Lab Results   Component Value Date    VITDT 35 09/12/2024    VITDT 32 04/19/2022      Recent Labs   Lab Test 02/07/20  0940   MAG 1.9     Last Comprehensive Metabolic Panel:  Sodium   Date Value Ref Range Status   12/20/2019 141 136 - 145 mmol/L Final     Potassium   Date Value Ref Range Status   12/20/2019 3.7 3.5 - 5.0 mmol/L Final     Chloride   Date Value Ref Range Status   12/20/2019 108 (H) 98 - 107 mmol/L Final     Carbon Dioxide (CO2)   Date Value Ref Range Status   12/20/2019 21 (L) 22 - 31 mmol/L Final     Anion Gap   Date Value Ref Range Status   12/20/2019 12 5 - 18 mmol/L Final     Glucose   Date Value Ref Range Status   09/12/2024 86 70 - 99 mg/dL Final   04/19/2022 84 70 - 125 mg/dL Final     Urea Nitrogen   Date Value Ref Range Status   12/20/2019 14 8 - 22 mg/dL Final     Creatinine   Date Value Ref Range Status   11/23/2020 0.73 0.60 - 1.10 mg/dL Final     GFR Estimate   Date Value Ref Range Status   11/23/2020 >60 >60 mL/min/1.73m2 Final     Calcium   Date Value Ref Range Status   11/23/2020 8.9 8.5 - 10.5 mg/dL Final     Bilirubin Total   Date Value Ref Range Status   12/20/2019 0.5 0.0 - 1.0 mg/dL Final     Alkaline Phosphatase   Date Value Ref Range Status   12/20/2019 54 45 - 120 U/L Final     ALT   Date Value Ref Range Status   11/23/2020 14 0 - 45 U/L Final     AST   Date Value Ref Range Status   11/23/2020 15 0 - 40 U/L Final     Imaging:    I personally reviewed the following imaging results today and those on care everywhere, if indicated     MR Brain COW carotid w/wo Contrast  12/17/29  IMPRESSION:  HEAD MRI:   1.  Normal head MRI.     HEAD MRA:  1.  1 x 2 mm aneurysm versus infundibulum in the left MCA trifurcation.  2.  Further assessment with a head CTA is recommended as this may be able to discern between the 2.  3.  The head MRA is otherwise normal.     4.  NECK MRA:  5.  Normal neck MRA.       CTA Head without Contrast 12/17/19  IMPRESSION:  HEAD CT:  1.  Normal head CT.     HEAD CTA:   1.  1.7 x 0.8 mm triangular prominence projecting cranially from the left MCA trifurcation. It is well visualized on coronal MIP image 26 and sagittal MIP image 38. No definite vessel arises from this prominence. This may be a tiny aneurysm.  2.  Continued follow-up is recommended. This can be done on an outpatient basis.  3.  The head CT is otherwise normal.  4.  Essentially fetal posterior cerebral arteries bilaterally.     Findings were discussed with Dr. Chahal at 7:55 PM hours on 12/17/2019.  Findings were discussed with Dr. Chahal at 6:54 PM hours on 12/17/2019.    MR Cervical spine wo contrast 2/7/2020   IMPRESSION:  1.  Mild cervical spondylosis most significant at C5-C6. No cord signal abnormality.  2.  At C5-C6, mild spinal canal stenosis and low-grade narrowing of the right neural foramen.  3.  No high-grade spinal canal or neural foraminal stenosis elsewhere.       MRA Brain ( [place] [facility]) wo Contrast  9/14/23  IMPRESSION:  1.  Stable appearance to a tiny infundibulum most likely in the left MCA trifurcation versus a very small aneurysm measuring 1-2 mm.    Reviewed imaging from [facility] sites     Medical Records Reviewed:    Reviewed consults/documents from [facility] including  Family Medicine, Rheumatology, Neurology, and Behavioral Health       Again, thank you for allowing me to participate in the care of your patient.      Sincerely,    Amisha Gautam PA-C

## 2024-11-07 NOTE — NURSING NOTE
Current patient location[address]  [facility] [address]    Is the patient currently in the state of [place]? YES    Visit mode:VIDEO     If the visit is dropped, the patient can be reconnected by: VIDEO VISIT: Text to cell phone:   Telephone Information:   Mobile [phone]       Will anyone else be joining the visit? NO  (If patient encounters technical issues they should call [phone]    Are changes needed to the allergy or medication list? No    Are refills needed on medications prescribed by this physician? NO    Rooming Documentation:  Questionnaire(s) completed    Reason for visit: Consult    Valencia LUCERO    Depression Response    Patient completed the PHQ-9 assessment for depression and scored >9? Yes  Question 9 on the PHQ-9 was positive for suicidality? No  Does patient have current mental health provider? Yes    Is this a virtual visit? Yes   Does patient have suicidal ideation (positive question 9)? No - offer to place Mental Health Referral.  Patient declined referral/not needed    I personally notified the following: visit provider

## 2024-11-07 NOTE — PROGRESS NOTES
Anahi Stockton is a 44 year old female who presents to be evaluated for a billable video visit.    Video-Visit Details    Video visit Start time: 12:42 PM    Type of service:  Video Visit    Video End Time: 1:06 PM    Originating Location (pt. Location): Home    Distant Location (provider location):  Off- Site    Platform used for Video Visit: Wei    Assessment/ Impression:   1. Chronic fatigue  Patient with significant chronic fatigue since viral infection October 2019.  Discussed due to increased somnolence upon waking she may require sleep medicine evaluation and to monitor sleep.  Discussed looking for underlying causes such as anemia.  Will check ferritin and B12. Discussed energy conservation and provided information on fatigue management.  Also discussed referral to Occupational therapy for the COVID-19 program.  Lastly discussed either co-Q10 100 mg twice a day with side effects of GI upset for energy.  Discussed also low-dose naltrexone due to history of fibromyalgia starting at 1 mg daily.  Discussed side effects of anxiety, sleep disturbance, and GI upset.  Patient wishes to research medications more and will let me know which 1 she prefers.  - Adult Physical Medicine and Rehab  Referral  - Ferritin; Future  - Vitamin B12; Future  - Occupational Therapy  Referral; Future    2. Post viral syndrome (Primary)  Discussed COVID and Post COVID with patient.  Educational materials provided and all questions answered.  As symptoms started prior to COVID believe this is more postviral syndrome.  Patient did test high for CMV IgG and discussed symptoms of CMV infection.  Discussed that with postviral fatigue along with fibromyalgia she would benefit from occupational therapy as above.        Plan:  I reviewed present knowledge on long-Covid.  Education was provided and question were answered.  Orders/Referrals as above  Will advised patient on test results  I will follow up with Anahi MALLORY Niviasharad in  "5 weeks. I will review progress and consider need for any other therapeutic interventions. If there are any questions and/or concerns she will call the clinic.      On day of encounter time spent in chart review and with patient in consultation, exam, education, coordination of care, review of outside charts/data and documentation:  35 minutes     I have attempted to proof read for major spelling errors and apologize for any minor errors I may have missed.      This note was dictated using voice recognition software. Any grammatical or context distortions are unintentional and inherent to the software.    _____________  TESSA [facility] PHYSICAL MEDICINE AND REHABILITATION CLINIC [place]    Subjective   This 44 year old female presents to the [facility] Rehabilitation Medicine Post-COVID clinic as a new consult to evaluate continuing symptoms after COVID infection initially diagnosed 8/23/23.  Anahi Stockton presented to their primary care physician on 8/23/23 complaining of  Sore throat, fever, generalized aches and pains, fatigue, and weakness. Treatment was Paxlovid.   Prior to above COVID in fall of 2019 had a \"bad Cold\" that took her a month to recover. Anahi Stockton experienced complications of fatigue.  A few weeks after she got tingling/weakness in hands and legs.  In December of 2019 and was admitted to the hospital for evaluation of acute right side weakness and blurred vision. She was found to have an incidental Left MCA trifurcation aneurysm. . She was also getting headaches.   She was seen and had testing done through Neurology and Rheumatology and was ultimately diagnosed with Fibromyalgia. She has trailed several medications but was unable to continue due to side effects.  She is tired when she wakes up and will feel worse by lunch. She has some energy and then will feel exhausted by end of day. She did decrease work to 30 hours.   She does have some nights " were she wakes up at 2am.  She does have a history of snoring.   She has enough energy to make it through work but is not present at night due to her significant fatigue. Past medical history is significant for  Allergic Rhinitis . The patient was vaccinated against COVID x5, last vaccine 11/20/23 .  Previous activity was full time work.  [employer] [facility]    History of COVID-19 infection: 8/2023  Date of first symptoms: 8/2023  Diagnosis: antigen  Hospitalization: No  Treatment: Paxlovid  Current Symptoms: See subjective  Goals of Care: increase energy and improve quality of life          11/6/2024     7:19 PM   PHQ Assesment Total Score(s)   PHQ-9 Score 15        Patient-reported           11/6/2024     7:20 PM   MING-7 Results   MING 7 TOTAL SCORE 9 (mild anxiety)   MING-7 Total Score 9        Patient-reported         11/6/2024     7:21 PM   PTSD Screen Score   Have you ever experienced this kind of event? No        Patient-reported         11/6/2024     7:25 PM   PROMIS-29   PROMIS Physical Function T-Score 38 (moderate dysfunction)   PROMIS Anxiety T-Score 67 (moderate)   PROMIS Depression T-Score 62 (moderate)   PROMIS Fatigue T-Score 69 (moderate)   PROMIS Sleep Disturbance T-Score 54 (within normal limits)   PROMIS Ability to Participate in Social Roles & Activities T-Score 39 (moderate dysfunction)   PROMIS Pain Interference T-Score 65 (moderate)   PROMIS Pain Intensity 3       Past Medical History:   Diagnosis Date    Adjustment disorder with anxious mood 03/08/2021    Aneurysm (H)     Anxiety 06/22/2020    Depression        No past surgical history on file.    Family History   Problem Relation Age of Onset    Hypertension Mother     Diabetes Father     Cerebrovascular Disease Father     Hypertension Father     Depression Brother     Breast Cancer No family hx of     Ovarian Cancer No family hx of        Social History     Tobacco Use    Smoking status: Never     Passive exposure: Never    Smokeless  tobacco: Never   Vaping Use    Vaping status: Never Used   Substance Use Topics    Alcohol use: Not Currently    Drug use: Never         Current Outpatient Medications:     acetaminophen (TYLENOL) 500 MG tablet, [ACETAMINOPHEN (TYLENOL) 500 MG TABLET] Take 500 mg by mouth every 6 (six) hours as needed for pain., Disp: , Rfl:     escitalopram (LEXAPRO) 5 MG tablet, Take 1 tablet (5 mg) by mouth daily., Disp: 90 tablet, Rfl: 3    ibuprofen (ADVIL/MOTRIN) 200 MG tablet, Take 200-400 mg by mouth every 6 hours as needed, Disp: , Rfl:     methylphenidate (RITALIN) 5 MG tablet, Take 1 tablet (5 mg) by mouth daily., Disp: 30 tablet, Rfl: 0    multivitamin therapeutic tablet, [MULTIVITAMIN THERAPEUTIC TABLET] Take 1 tablet by mouth daily., Disp: , Rfl:     SUMAtriptan (IMITREX) 50 MG tablet, Take 1 tablet (50 mg) by mouth at onset of headache for migraine. May repeat in 2 hours. Max 4 tablets/24 hours., Disp: 30 tablet, Rfl: 1    tretinoin (RETIN-A) 0.05 % external cream, Apply topically At Bedtime, Disp: 45 g, Rfl: 3    vitamin D3 (CHOLECALCIFEROL) 50 mcg (2000 units) tablet, Take 1 tablet by mouth daily, Disp: , Rfl:     Review of Systems   Constitutional, HEENT, cardiovascular, pulmonary, GI, , musculoskeletal, neuro, skin, endocrine and psych systems are negative, except as otherwise noted.      Objective    Vitals:  No vitals were obtained today due to virtual visit.    Physical Exam   EYES: Eyes grossly normal to inspection.  No discharge or erythema, or obvious scleral/conjunctival abnormalities.  SKIN: Visible skin clear. No significant rash, abnormal pigmentation or lesions.  NEURO: Cranial nerves grossly intact.  Mentation and speech appropriate for age.  GENERAL: Healthy, alert and no distress  RESP: No audible wheeze, cough, or visible cyanosis.  No visible retractions or increased work of breathing.    PSYCH: Mentation appears normal, affect normal/bright, judgement and insight intact, normal speech and  "appearance well-groomed.            CRP   Date Value Ref Range Status   11/23/2020 <0.1 0.0 - 0.8 mg/dL Final      Erythrocyte Sedimentation Rate   Date Value Ref Range Status   11/23/2020 7 0 - 20 mm/hr Final      Last Renal Panel:  Sodium   Date Value Ref Range Status   12/20/2019 141 136 - 145 mmol/L Final     Potassium   Date Value Ref Range Status   12/20/2019 3.7 3.5 - 5.0 mmol/L Final     Chloride   Date Value Ref Range Status   12/20/2019 108 (H) 98 - 107 mmol/L Final     Carbon Dioxide (CO2)   Date Value Ref Range Status   12/20/2019 21 (L) 22 - 31 mmol/L Final     Anion Gap   Date Value Ref Range Status   12/20/2019 12 5 - 18 mmol/L Final     Glucose   Date Value Ref Range Status   09/12/2024 86 70 - 99 mg/dL Final   04/19/2022 84 70 - 125 mg/dL Final     Urea Nitrogen   Date Value Ref Range Status   12/20/2019 14 8 - 22 mg/dL Final     Creatinine   Date Value Ref Range Status   11/23/2020 0.73 0.60 - 1.10 mg/dL Final     GFR Estimate   Date Value Ref Range Status   11/23/2020 >60 >60 mL/min/1.73m2 Final     Calcium   Date Value Ref Range Status   11/23/2020 8.9 8.5 - 10.5 mg/dL Final     Albumin   Date Value Ref Range Status   11/23/2020 4.1 3.5 - 5.0 g/dL Final      Lab Results   Component Value Date    WBC 4.9 09/12/2024     Lab Results   Component Value Date    RBC 4.60 09/12/2024     Lab Results   Component Value Date    HGB 13.9 09/12/2024     Lab Results   Component Value Date    HCT 40.8 09/12/2024     No components found for: \"MCT\"  Lab Results   Component Value Date    MCV 89 09/12/2024     Lab Results   Component Value Date    MCH 30.2 09/12/2024     Lab Results   Component Value Date    MCHC 34.1 09/12/2024     Lab Results   Component Value Date    RDW 11.7 09/12/2024     Lab Results   Component Value Date     09/12/2024      Lab Results   Component Value Date    A1C 5.0 09/12/2024      TSH   Date Value Ref Range Status   09/12/2024 2.54 0.30 - 4.20 uIU/mL Final   12/20/2019 2.32 0.30 - " 5.00 uIU/mL Final      Lab Results   Component Value Date    VITDT 35 09/12/2024    VITDT 32 04/19/2022      Recent Labs   Lab Test 02/07/20  0940   MAG 1.9     Last Comprehensive Metabolic Panel:  Sodium   Date Value Ref Range Status   12/20/2019 141 136 - 145 mmol/L Final     Potassium   Date Value Ref Range Status   12/20/2019 3.7 3.5 - 5.0 mmol/L Final     Chloride   Date Value Ref Range Status   12/20/2019 108 (H) 98 - 107 mmol/L Final     Carbon Dioxide (CO2)   Date Value Ref Range Status   12/20/2019 21 (L) 22 - 31 mmol/L Final     Anion Gap   Date Value Ref Range Status   12/20/2019 12 5 - 18 mmol/L Final     Glucose   Date Value Ref Range Status   09/12/2024 86 70 - 99 mg/dL Final   04/19/2022 84 70 - 125 mg/dL Final     Urea Nitrogen   Date Value Ref Range Status   12/20/2019 14 8 - 22 mg/dL Final     Creatinine   Date Value Ref Range Status   11/23/2020 0.73 0.60 - 1.10 mg/dL Final     GFR Estimate   Date Value Ref Range Status   11/23/2020 >60 >60 mL/min/1.73m2 Final     Calcium   Date Value Ref Range Status   11/23/2020 8.9 8.5 - 10.5 mg/dL Final     Bilirubin Total   Date Value Ref Range Status   12/20/2019 0.5 0.0 - 1.0 mg/dL Final     Alkaline Phosphatase   Date Value Ref Range Status   12/20/2019 54 45 - 120 U/L Final     ALT   Date Value Ref Range Status   11/23/2020 14 0 - 45 U/L Final     AST   Date Value Ref Range Status   11/23/2020 15 0 - 40 U/L Final     Imaging:    I personally reviewed the following imaging results today and those on care everywhere, if indicated     MR Brain COW carotid w/wo Contrast 12/17/29  IMPRESSION:  HEAD MRI:   1.  Normal head MRI.     HEAD MRA:  1.  1 x 2 mm aneurysm versus infundibulum in the left MCA trifurcation.  2.  Further assessment with a head CTA is recommended as this may be able to discern between the 2.  3.  The head MRA is otherwise normal.     4.  NECK MRA:  5.  Normal neck MRA.       CTA Head without Contrast 12/17/19  IMPRESSION:  HEAD CT:  1.   Normal head CT.     HEAD CTA:   1.  1.7 x 0.8 mm triangular prominence projecting cranially from the left MCA trifurcation. It is well visualized on coronal MIP image 26 and sagittal MIP image 38. No definite vessel arises from this prominence. This may be a tiny aneurysm.  2.  Continued follow-up is recommended. This can be done on an outpatient basis.  3.  The head CT is otherwise normal.  4.  Essentially fetal posterior cerebral arteries bilaterally.     Findings were discussed with Dr. Chahal at 7:55 PM hours on 12/17/2019.  Findings were discussed with Dr. Chahal at 6:54 PM hours on 12/17/2019.    MR Cervical spine wo contrast 2/7/2020   IMPRESSION:  1.  Mild cervical spondylosis most significant at C5-C6. No cord signal abnormality.  2.  At C5-C6, mild spinal canal stenosis and low-grade narrowing of the right neural foramen.  3.  No high-grade spinal canal or neural foraminal stenosis elsewhere.       MRA Brain ( [place] wo Contrast  9/14/23  IMPRESSION:  1.  Stable appearance to a tiny infundibulum most likely in the left MCA trifurcation versus a very small aneurysm measuring 1-2 mm.    Reviewed imaging from [facility] sites     Medical Records Reviewed:    Reviewed consults/documents from [facility] including  Family Medicine, Rheumatology, Neurology, and Behavioral Health

## 2024-11-11 ENCOUNTER — LAB (OUTPATIENT)
Dept: LAB | Facility: CLINIC | Age: 44
End: 2024-11-11
Payer: COMMERCIAL

## 2024-11-11 ENCOUNTER — MYC MEDICAL ADVICE (OUTPATIENT)
Dept: PHYSICAL MEDICINE AND REHAB | Facility: CLINIC | Age: 44
End: 2024-11-11

## 2024-11-11 DIAGNOSIS — E61.1 IRON DEFICIENCY: ICD-10-CM

## 2024-11-11 DIAGNOSIS — R53.82 CHRONIC FATIGUE: ICD-10-CM

## 2024-11-11 DIAGNOSIS — R53.82 CHRONIC FATIGUE: Primary | ICD-10-CM

## 2024-11-11 LAB
FERRITIN SERPL-MCNC: 40 NG/ML (ref 6–175)
VIT B12 SERPL-MCNC: 720 PG/ML (ref 232–1245)

## 2024-11-11 PROCEDURE — 82728 ASSAY OF FERRITIN: CPT

## 2024-11-11 PROCEDURE — 82607 VITAMIN B-12: CPT

## 2024-11-11 PROCEDURE — 36415 COLL VENOUS BLD VENIPUNCTURE: CPT

## 2024-11-12 RX ORDER — UBIDECARENONE 50 MG
100 CAPSULE ORAL DAILY
Qty: 60 CAPSULE | Refills: 0 | Status: SHIPPED | OUTPATIENT
Start: 2024-11-12 | End: 2024-12-12

## 2024-11-14 ENCOUNTER — VIRTUAL VISIT (OUTPATIENT)
Dept: BEHAVIORAL HEALTH | Facility: CLINIC | Age: 44
End: 2024-11-14
Payer: COMMERCIAL

## 2024-11-14 DIAGNOSIS — F41.1 GENERALIZED ANXIETY DISORDER: Primary | ICD-10-CM

## 2024-11-14 PROCEDURE — 90837 PSYTX W PT 60 MINUTES: CPT | Mod: 95 | Performed by: SOCIAL WORKER

## 2024-11-14 NOTE — PROGRESS NOTES
"CHIEF COMPLAINT: Patient presents with:  Ent Problem: Concerns with \"crackling\" sound in ears for past 6 months to a year that comes and goes. Sometimes whooshing sound. Can get it to stop temporarily if manipulates ears. Intermittent bilateral fullness. Ears flushed by PCP on 9/12 which did not make a difference.   Snoring: No witnessed apnea. Does effect her sleep but it wakes spouse up. SNOT = 37.         HISTORY OF PRESENT ILLNESS    Anahi was seen at the behest of Faith Malik MD for hearing issues.  She also snores but denies daytime sleepiness.  She has a history of migraine headache (started age 39)    AUDIOLOGY NOTE:    HISTORY: New to Dr. Jimenez. Had cerumen flushed at PCP right 09/ 12/ 2024. Complains of a crackling sound in the ears for the past 6 months to a year. Sound is intermittent and sometimes whooshing. She can sometimes termporarily stop it by manipulating her ears. Reports intermittent bilateral aural fullness. There is a family history of hearing loss in her mother, who developed loss and started using hearing aids in her 50s. Denies significant difficulty hearing, otalgia, otorrhea, dizziness, prior ear surgery, noise exposure. RESULTS: Otoscopy: Clear bilat. Tymps: Normal mobility, bilat. Reflexes: Present ipsi bilat, elevated contra right, absent contra left. Audio: Normal hearing, bilaterally. REC: To ENT as planned. Recheck hearing per ENT or with concerns.      Sino-Nasal Outcome Test (SNOT - 22)    1. Need to Blow Nose: (Proxy-Rptd) (P) Mild or slight  2. Nasal Blockage: (Proxy-Rptd) (P) Mild or slight  3. Sneezing: (Proxy-Rptd) (P) Mild or slight  4. Runny Nose: (Proxy-Rptd) (P) Very mild  5. Cough: (Proxy-Rptd) (P) None  6. Post-nasal discharge: (Proxy-Rptd) (P) None  7. Thick nasal discharge: (Proxy-Rptd) (P) None  8. Ear fullness: (Proxy-Rptd) (P) Moderate  9. Dizziness: (Proxy-Rptd) (P) Mild or slight  10. Ear Pain: (Proxy-Rptd) (P) None  11. Facial pain/pressure: (Proxy-Rptd) (P) " None  12. Decreased Sense of Smell/Taste: (Proxy-Rptd) (P) Moderate  13. Difficulty falling asleep: (Proxy-Rptd) (P) Moderate  14. Wake up at night: (Proxy-Rptd) (P) Moderate  15. Lack of a good night's sleep: (Proxy-Rptd) (P) Moderate  16. Wake up tired: (Proxy-Rptd) (P) Mild or slight  17. Fatigue: (Proxy-Rptd) (P) Moderate  18. Reduced Productivity: (Proxy-Rptd) (P) Mild or slight  19. Reduced Concentration: (Proxy-Rptd) (P) Mild or slight  20. Frustrated/restless/irritable: (Proxy-Rptd) (P) Mild or slight  21. Sad: (Proxy-Rptd) (P) Mild or slight  22. Embarrassed: (Proxy-Rptd) (P) None    Total Score: (Proxy-Rptd) (P) 37    COPYRIGHT 1996. [facility]        REVIEW OF SYSTEMS    Review of Systems as per HPI and PMHx, otherwise 10 system review system are negative.       ALLERGIES    Patient has no known allergies.    CURRENT MEDICATIONS      Current Outpatient Medications:     acetaminophen (TYLENOL) 500 MG tablet, [ACETAMINOPHEN (TYLENOL) 500 MG TABLET] Take 500 mg by mouth every 6 (six) hours as needed for pain., Disp: , Rfl:     azelastine (ASTELIN) 0.1 % nasal spray, 2 sprays each nostril 1-2x daily as needed for nasal congestion (use nightly for first 2 week), Disp: 30 mL, Rfl: 11    coenzyme Q-10 (CO-Q10) 50 MG capsule, Take 2 capsules (100 mg) by mouth daily., Disp: 60 capsule, Rfl: 0    Elemental iron 65 mg Vitamin C 125 mg (VITRON C)  MG TABS tablet, Take 1 tablet by mouth daily., Disp: 90 tablet, Rfl: 0    escitalopram (LEXAPRO) 5 MG tablet, Take 1 tablet (5 mg) by mouth daily., Disp: 90 tablet, Rfl: 3    fluticasone (FLONASE) 50 MCG/ACT nasal spray, Spray 1 spray into both nostrils daily., Disp: , Rfl:     ibuprofen (ADVIL/MOTRIN) 200 MG tablet, Take 200-400 mg by mouth every 6 hours as needed, Disp: , Rfl:     magnesium oxide (MAG-OX) 400 MG tablet, Take 1 tablet (400 mg) by mouth daily., Disp: 360 tablet, Rfl: 1    methylphenidate (RITALIN) 5 MG tablet, Take 1  tablet (5 mg) by mouth daily., Disp: 30 tablet, Rfl: 0    multivitamin therapeutic tablet, [MULTIVITAMIN THERAPEUTIC TABLET] Take 1 tablet by mouth daily., Disp: , Rfl:     paragard intrauterine copper device, 1 each by Intrauterine route once., Disp: , Rfl:     SUMAtriptan (IMITREX) 50 MG tablet, Take 1 tablet (50 mg) by mouth at onset of headache for migraine. May repeat in 2 hours. Max 4 tablets/24 hours., Disp: 30 tablet, Rfl: 1    tretinoin (RETIN-A) 0.05 % external cream, Apply topically At Bedtime, Disp: 45 g, Rfl: 3    vitamin D3 (CHOLECALCIFEROL) 50 mcg (2000 units) tablet, Take 1 tablet by mouth daily, Disp: , Rfl:      PAST MEDICAL HISTORY    PAST MEDICAL HISTORY:   Past Medical History:   Diagnosis Date    Adjustment disorder with anxious mood 03/08/2021    Aneurysm (H)     Anxiety 06/22/2020    Depression        PAST SURGICAL HISTORY    PAST SURGICAL HISTORY: No past surgical history on file.    FAMILY  HISTORY    FAMILY HISTORY:   Family History   Problem Relation Age of Onset    Hypertension Mother     Diabetes Father     Cerebrovascular Disease Father     Hypertension Father     Depression Brother     Breast Cancer No family hx of     Ovarian Cancer No family hx of        SOCIAL HISTORY    SOCIAL HISTORY:   Social History     Tobacco Use    Smoking status: Never     Passive exposure: Never    Smokeless tobacco: Never   Substance Use Topics    Alcohol use: Not Currently        PHYSICAL EXAM    HEAD: Normal appearance and symmetry:  No cutaneous lesions.      NECK:  supple     EARS:    Right:   TM intact nl    LEFT:   TM intact nl    EYES:  EOMI    CN VII/XII:  intact     NOSE:     Dorsum:   straight  Septum:  midline  Mucosa:  moist        ORAL CAVITY/OROPHARYNX:     Lips:  Normal.  Tongue: normal, midline  Mucosa:   no lesions     NECK:  Trachea:  midline.              Thyroid:  normal              Adenopathy:  none        NEURO:   Alert and Oriented     GAIT AND STATION:  normal     RESPIRATORY:    Symmetry and Respiratory effort     PSYCH:  Normal mood and affect     SKIN:   warm and dry         IMPRESSION:    Encounter Diagnoses   Name Primary?    Tinnitus, bilateral Yes    Normal hearing noted on examination     Snores     Migraine without aura and without status migrainosus, not intractable     Chronic rhinitis     Ear fullness, bilateral         RECOMMENDATIONS:    Orders Placed This Encounter   Medications    paragard intrauterine copper device     Si each by Intrauterine route once.    fluticasone (FLONASE) 50 MCG/ACT nasal spray     Sig: Spray 1 spray into both nostrils daily.    magnesium oxide (MAG-OX) 400 MG tablet     Sig: Take 1 tablet (400 mg) by mouth daily.     Dispense:  360 tablet     Refill:  1    azelastine (ASTELIN) 0.1 % nasal spray     Si sprays each nostril 1-2x daily as needed for nasal congestion (use nightly for first 2 week)     Dispense:  30 mL     Refill:  11

## 2024-11-14 NOTE — PROGRESS NOTES
[facility]                                     Progress Note    Patient Name: Anahi Stockton  Date: 11/14/2024         Service Type: Individual      Session Start Time: 10:00am  Session End Time: 10:53am     Session Length: 53 minutes    Session #: 6    Attendees: Client attended alone    Service Modality:  Video Visit:      Provider verified identity through the following two step process.  Patient provided:  Patient is known previously to provider    Telemedicine Visit: The patient's condition can be safely assessed and treated via synchronous audio and visual telemedicine encounter.      Reason for Telemedicine Visit: Patient has requested telehealth visit    Originating Site (Patient Location): Patient's home    Distant Site (Provider Location): Provider Remote Setting- Home Office    Consent:  The patient/guardian has verbally consented to: the potential risks and benefits of telemedicine (video visit) versus in person care; bill my insurance or make self-payment for services provided; and responsibility for payment of non-covered services.     Patient would like the video invitation sent by:  My Chart    Mode of Communication:  Video Conference via [facility]    Distant Location (Provider):  Off-site    As the provider I attest to compliance with applicable laws and regulations related to telemedicine.    DATA  Extended Session (53+ minutes): PROLONGED SERVICE IN THE OUTPATIENT SETTING REQUIRING DIRECT (FACE-TO-FACE) PATIENT CONTACT BEYOND THE USUAL SERVICE:    - Treatment protocol required additional time to complete, due to the nature of diagnosis being treated.  See Interventions section for details  Interactive Complexity: No  Crisis: No        Progress Since Last Session (Related to Symptoms / Goals / Homework):   Symptoms: No change anxiety    Homework: Achieved / completed to satisfaction      Episode of Care Goals: Satisfactory progress - ACTION (Actively working towards change);  "Intervened by reinforcing change plan / affirming steps taken     Current / Ongoing Stressors and Concerns:   Patient provides a status update while therapist utilizes reflective listening skills. Patient reflects upon the disappointing election results. She identifies several anxiety triggers: election results, health concerns, chronic fatigue and low energy. Patient attended a visit with a provider at the [facility]. She is going to try new supplements and medication and see how that goes. She was encouraged to follow up with occupational therapy. Patient shares that she \"does not feel good\" or have enough energy to do enjoyable activities. She shares that her body always feels heavy and she doesn't feel she has a high quality of living. She dreads waking up in the morning and she feels sad much of the time. She has had negative side effects from depression medications in the past. \"I wish I could be mentally stronger.\" She often feels like \"it's too much and I just want to go lay in bed.\"      Treatment Objective(s) Addressed in This Session:   identify the fears / thoughts that contribute to feeling anxious  Gain insight      Intervention:   Discussed mindfulness as being aware of what we are experiencing while we are experiencing it.  Contrasted this with avoidance and rumination.  Explored the role of mindfulness as an overall coping strategy for managing depression, anxiety, and strong emotions.  Led client in a guided mindfulness exercise focusing on sensations, images, feelings, and thoughts.  Discussed mindfulness as a tool to intentionally shift our awareness and focus as needed. Discussed importance of taking breaks and restorative practices. Reviewed thought re-framing practices.       Assessments completed prior to visit:  The following assessments were completed by patient for this visit:  PHQ9:       3/4/2024     8:36 AM 4/4/2024     8:43 AM 5/14/2024     9:49 PM 7/17/2024     7:03 PM 9/8/2024 "     2:34 PM 10/9/2024     7:11 PM 11/6/2024     7:19 PM   PHQ-9 SCORE   PHQ-9 Total Score MyChart  7 (Mild depression) 7 (Mild depression) 11 (Moderate depression) 7 (Mild depression) 13 (Moderate depression) 15 (Moderately severe depression)   PHQ-9 Total Score 19 7 7    7 11 7 13 15        Patient-reported    Multiple values from one day are sorted in reverse-chronological order     GAD7:       4/1/2024     9:34 AM 4/4/2024     8:43 AM 5/2/2024    10:49 AM 5/22/2024     2:34 PM 7/17/2024     7:03 PM 10/9/2024     7:12 PM 11/6/2024     7:20 PM   MING-7 SCORE   Total Score 16 (severe anxiety) 6 (mild anxiety) 11 (moderate anxiety) 9 (mild anxiety) 7 (mild anxiety) 14 (moderate anxiety) 9 (mild anxiety)   Total Score 16 6 11 9 7 14 9        Patient-reported     PROMIS 10-Global Health (only subscores and total score):       4/4/2024     8:39 AM 5/2/2024    10:51 AM 7/17/2024     6:59 PM 11/13/2024     6:46 PM   PROMIS-10 Scores Only   Global Mental Health Score 13 12 11 11    Global Physical Health Score 12 12 12 11    PROMIS TOTAL - SUBSCORES 25 24 23 22        Patient-reported         ASSESSMENT: Current Emotional / Mental Status (status of significant symptoms):   Risk status (Self / Other harm or suicidal ideation)   Patient denies current fears or concerns for personal safety.   Patient denies current or recent suicidal ideation or behaviors.   Patient denies current or recent homicidal ideation or behaviors.   Patient denies current or recent self injurious behavior or ideation.   Patient denies other safety concerns.   Patient reports there has been no change in risk factors since their last session.     Patient reports there has been no change in protective factors since their last session.     Recommended that patient call 911 or go to the local ED should there be a change in any of these risk factors     Appearance:   Appropriate    Eye Contact:   Good    Psychomotor Behavior: Normal     Attitude:   Cooperative    Orientation:   All   Speech    Rate / Production: Normal/ Responsive Normal     Volume:  Normal    Mood:    Anxious  Depressed    Affect:    Appropriate  Worrisome    Thought Content:  Clear    Thought Form:  Coherent  Logical    Insight:    Good      Medication Review:   No changes to current psychiatric medication(s)     Medication Compliance:   Yes     Changes in Health Issues:   None reported     Chemical Use Review:   Substance Use: Chemical use reviewed, no active concerns identified      Tobacco Use: No current tobacco use.      Diagnosis:  1. Generalized anxiety disorder    R/O MDD    Collateral Reports Completed:   Not Applicable    PLAN: (Patient Tasks / Therapist Tasks / Other)  Return for a virtual visit in 1 month  Patient encouraged to practice grounding techniques   Patient encouraged to practice self-compassion and thought re-framing   Patient encouraged to consider a gratitude journal at night    There has been demonstrated improvement in functioning while patient has been engaged in psychotherapy/psychological service- if withdrawn the patient would deteriorate and/or relapse.           Carolyne Figueredo, [facility]                                                         ______________________________________________________________________    Individual Treatment Plan    Patient's Name: Anahi Stockton  YOB: 1980    Date of Creation: 5/2/2024  Date Treatment Plan Last Reviewed/Revised: 11/14/2024    DSM5 Diagnoses: 300.02 (F41.1) Generalized Anxiety Disorder  Psychosocial / Contextual Factors: 44 year old female, , mother of 2 girls  PROMIS (reviewed every 90 days):   PROMIS 10-Global Health (only subscores and total score):       4/4/2024     8:39 AM 5/2/2024    10:51 AM 7/17/2024     6:59 PM 11/13/2024     6:46 PM   PROMIS-10 Scores Only   Global Mental Health Score 13 12 11 11    Global Physical Health Score 12 12 12 11    PROMIS TOTAL - SUBSCORES 25  24 23 22        Referral / Collaboration:  Referral to another professional/service is not indicated at this time..    Anticipated number of session for this episode of care: 3-6 sessions  Anticipation frequency of session: Monthly  Anticipated Duration of each session: 53 or more minutes  Treatment plan will be reviewed in 90 days or when goals have been changed.       MeasurableTreatment Goal(s) related to diagnosis / functional impairment(s)  Goal 1: Patient will manage symptoms of anxiety as evidenced by a MING-7 score of 5 or lower    I will know I've met my goal when I feel more confident.      Objective #A (Patient Action)    Patient will identify the fears / thoughts that contribute to feeling anxious.  Status: Continued - Date(s): 11/14/2024     Intervention(s)  Therapist will teach emotional recognition/identification.   .    Objective #B  Patient will use relaxation strategies multiple times per day to reduce the physical symptoms of anxiety.  Status: Continued - Date(s): 11/14/2024     Intervention(s)  Therapist will  teach relaxation strategies .    Objective #C  Patient will use thought-stopping strategy daily to reduce intrusive thoughts.  Status: Continued - Date(s): 11/14/2024     Intervention(s)  Therapist will  teach thought stopping and other CBT strategies to manage thoughts .      Patient has reviewed and agreed to the above plan.      Carolyne Figueredo, [facility]  November 14, 2024

## 2024-11-15 ENCOUNTER — OFFICE VISIT (OUTPATIENT)
Dept: AUDIOLOGY | Facility: CLINIC | Age: 44
End: 2024-11-15
Payer: COMMERCIAL

## 2024-11-15 ENCOUNTER — OFFICE VISIT (OUTPATIENT)
Dept: OTOLARYNGOLOGY | Facility: CLINIC | Age: 44
End: 2024-11-15
Payer: COMMERCIAL

## 2024-11-15 DIAGNOSIS — Z01.10 NORMAL HEARING NOTED ON EXAMINATION: ICD-10-CM

## 2024-11-15 DIAGNOSIS — H93.13 TINNITUS OF BOTH EARS: Primary | ICD-10-CM

## 2024-11-15 DIAGNOSIS — Z01.10 HEARING WITHIN NORMAL LIMITS IN BOTH EARS: ICD-10-CM

## 2024-11-15 DIAGNOSIS — H93.8X3 EAR FULLNESS, BILATERAL: ICD-10-CM

## 2024-11-15 DIAGNOSIS — H93.13 TINNITUS, BILATERAL: Primary | ICD-10-CM

## 2024-11-15 DIAGNOSIS — G43.009 MIGRAINE WITHOUT AURA AND WITHOUT STATUS MIGRAINOSUS, NOT INTRACTABLE: ICD-10-CM

## 2024-11-15 DIAGNOSIS — R06.83 SNORES: ICD-10-CM

## 2024-11-15 DIAGNOSIS — J31.0 CHRONIC RHINITIS: ICD-10-CM

## 2024-11-15 RX ORDER — AZELASTINE 1 MG/ML
SPRAY, METERED NASAL
Qty: 30 ML | Refills: 11 | Status: SHIPPED | OUTPATIENT
Start: 2024-11-15

## 2024-11-15 RX ORDER — COPPER 313.4 MG/1
1 INTRAUTERINE DEVICE INTRAUTERINE ONCE
COMMUNITY

## 2024-11-15 RX ORDER — MAGNESIUM OXIDE 400 MG/1
400 TABLET ORAL DAILY
Qty: 360 TABLET | Refills: 1 | Status: SHIPPED | OUTPATIENT
Start: 2024-11-15

## 2024-11-15 RX ORDER — FLUTICASONE PROPIONATE 50 MCG
1 SPRAY, SUSPENSION (ML) NASAL DAILY
COMMUNITY
End: 2024-11-15

## 2024-11-15 NOTE — NURSING NOTE
Sino-Nasal Outcome Test (SNOT - 22)    1. Need to Blow Nose: (Proxy-Rptd) (P) Mild or slight  2. Nasal Blockage: (Proxy-Rptd) (P) Mild or slight  3. Sneezing: (Proxy-Rptd) (P) Mild or slight  4. Runny Nose: (Proxy-Rptd) (P) Very mild  5. Cough: (Proxy-Rptd) (P) None  6. Post-nasal discharge: (Proxy-Rptd) (P) None  7. Thick nasal discharge: (Proxy-Rptd) (P) None  8. Ear fullness: (Proxy-Rptd) (P) Moderate  9. Dizziness: (Proxy-Rptd) (P) Mild or slight  10. Ear Pain: (Proxy-Rptd) (P) None  11. Facial pain/pressure: (Proxy-Rptd) (P) None  12. Decreased Sense of Smell/Taste: (Proxy-Rptd) (P) Moderate  13. Difficulty falling asleep: (Proxy-Rptd) (P) Moderate  14. Wake up at night: (Proxy-Rptd) (P) Moderate  15. Lack of a good night's sleep: (Proxy-Rptd) (P) Moderate  16. Wake up tired: (Proxy-Rptd) (P) Mild or slight  17. Fatigue: (Proxy-Rptd) (P) Moderate  18. Reduced Productivity: (Proxy-Rptd) (P) Mild or slight  19. Reduced Concentration: (Proxy-Rptd) (P) Mild or slight  20. Frustrated/restless/irritable: (Proxy-Rptd) (P) Mild or slight  21. Sad: (Proxy-Rptd) (P) Mild or slight  22. Embarrassed: (Proxy-Rptd) (P) None    Total Score: (Proxy-Rptd) (P) 37    COPYRIGHT 1996. [facility]

## 2024-11-15 NOTE — PATIENT INSTRUCTIONS
"Migraine Diet       Food may play a significant role in the frequency of migraine. Although some migraine patients find that eating certain foods will provoke symptoms every single time, the effect of diet may be less obvious.    In general, the more \"trigger\" foods you consume, the more symptoms you may have. By avoiding these possible triggers, you can minimize your symptoms.     Eating regularly timed meals, avoiding hunger, avoiding dehydration, and avoiding skipping meals      Try following this list as strictly as possible for at least two months.     You may gradually add back your favorite foods one at a time, keeping track of your headaches as you do so.     Category  Foods to Avoid, Reduce, or Limit  Foods that are OK    Caffeine  No more than 2 servings / day. Do not vary the amount or timing from day to day. Coffee, tea, fredrick, Mountain Dew, Sunkist, certain medications (Anacin, Excedrin)  Decaffeinated coffee, herbal or green tea, caffeine-free sodas, fruit juice (see below)    Snacks / Desserts  Chocolate, nuts (peanuts, especially), peanut butter, seeds  Fruits listed below, sherbet, ice cream, cakes, pudding, Jello, sugar, jam, jelly, honey, hard candy, cookies made w/o chocolate or nuts    Alcohol  Avoid all, especially: ales, Burgundy, chianti, malted beers, red wine, felicitas, vermouth. Note: some medications contain alcohol (Nyquil)  Non-alcoholic beverages    Dairy  Aged cheeses: Brie, blue, boursault, brick, Camembert, cheddar, Emmenlalaer, gouda, mozzarella, Parmesan, Provolone, Bee, Roquefort, stilton, Swiss, etc.   Buttermilk, chocolate milk, sour cream   Eggs and yogurt should be limited to 2-3 times per week  Other cheeses: American, cottage, cream cheese, farmer, ricotta, Velveeta.   Milk,   Egg substitute    Cereals & Grains  Fresh breads and yeast products, fresh bagels, fresh doughnuts, yeast extracts, woodson's yeast, sourdough   (*freezing bread may inactivate yeast)  Commercial " "breads (white, wheat, rye, multi-grain, Italian), English Muffins, crackers, rye, toast, bagels, potatoes, rice, spaghetti, noodles, hot or dried cereals, oatmeal    Meats  Aged, canned, cured, or processed meats (bologna, pepperoni, salami, other pre-packaged deli meats), pickled meats or fish, salted or dried meats or poultry, hot dogs, sausages, jerky  Fresh / unprocessed meats, poultry, fish, lamb, pork, veal, lamb, tuna    MSG (monosodium glutamate)  Avoid glutamate in all its multiple forms: MSG, \"natural flavoring,\" \"flavor enhancer,\" etc.   Soy sauce, foods containing \"hydrolyzed protein products\" or \"autolyzed yeast\", canned soups, bouillon cubes, Accent, meat tenderizers, seasoned salts. Pickled, preserved or marinated foods  Salt and other spices, butter, margarine, cooking oil, white vinegar, salad dressing (small amounts)    Sweeteners  Aspartame (Equal, Nutrasweet) (somewhat controversial)  Sucrose (sugar), high fructose corn syrup, sucralose (Splenda), saccharin (Sweet 'n Low)    Vegetables  Pole or broad beans, lima beans, Italian beans, lentils, snow peas, geri beans, Navy beans, smith beans, pea pods, sauerkraut, garbanzo beans, onions, olives, pickles  Asparagus, beets, broccoli, carrots, corn, lettuce, pumpkins, spinach, squash, string beans, tomatoes- all those not listed    Fruit  Avocados, figs, papaya, passion fruit, raisins, red plums. Limit bananas and citrus fruit & juice (orange, lemon, lime, grapefruit, tangerines) to   cup per day  Apples, berries, peaches, pears, prunes, fruit cocktail    Mixed Dishes  Frozen meals/Microwave ready          "

## 2024-11-15 NOTE — PROGRESS NOTES
AUDIOLOGY REPORT     SUMMARY: Audiology visit completed. See audiogram for results.       RECOMMENDATIONS: Follow-up with ENT.    Florentino George, [facility]  [place] Licensed Audiologist #[id number]

## 2024-11-15 NOTE — LETTER
"11/15/2024      Anahi Stockton  [address] [facility] [address]      Dear Colleague,    Thank you for referring your patient, Anahi Stockton, to the [facility]. Please see a copy of my visit note below.    CHIEF COMPLAINT: Patient presents with:  Ent Problem: Concerns with \"crackling\" sound in ears for past 6 months to a year that comes and goes. Sometimes whooshing sound. Can get it to stop temporarily if manipulates ears. Intermittent bilateral fullness. Ears flushed by PCP on 9/12 which did not make a difference.   Snoring: No witnessed apnea. Does effect her sleep but it wakes spouse up. SNOT = 37.         HISTORY OF PRESENT ILLNESS    Anahi was seen at the behest of Faith Malik MD for hearing issues.  She also snores but denies daytime sleepiness.  She has a history of migraine headache (started age 39)    AUDIOLOGY NOTE:    HISTORY: New to Dr. Jimenez. Had cerumen flushed at PCP right 09/ 12/ 2024. Complains of a crackling sound in the ears for the past 6 months to a year. Sound is intermittent and sometimes whooshing. She can sometimes termporarily stop it by manipulating her ears. Reports intermittent bilateral aural fullness. There is a family history of hearing loss in her mother, who developed loss and started using hearing aids in her 50s. Denies significant difficulty hearing, otalgia, otorrhea, dizziness, prior ear surgery, noise exposure. RESULTS: Otoscopy: Clear bilat. Tymps: Normal mobility, bilat. Reflexes: Present ipsi bilat, elevated contra right, absent contra left. Audio: Normal hearing, bilaterally. REC: To ENT as planned. Recheck hearing per ENT or with concerns.      Sino-Nasal Outcome Test (SNOT - 22)    1. Need to Blow Nose: (Proxy-Rptd) (P) Mild or slight  2. Nasal Blockage: (Proxy-Rptd) (P) Mild or slight  3. Sneezing: (Proxy-Rptd) (P) Mild or slight  4. Runny Nose: (Proxy-Rptd) (P) Very mild  5. Cough: (Proxy-Rptd) (P) None  6. Post-nasal discharge: " (Proxy-Rptd) (P) None  7. Thick nasal discharge: (Proxy-Rptd) (P) None  8. Ear fullness: (Proxy-Rptd) (P) Moderate  9. Dizziness: (Proxy-Rptd) (P) Mild or slight  10. Ear Pain: (Proxy-Rptd) (P) None  11. Facial pain/pressure: (Proxy-Rptd) (P) None  12. Decreased Sense of Smell/Taste: (Proxy-Rptd) (P) Moderate  13. Difficulty falling asleep: (Proxy-Rptd) (P) Moderate  14. Wake up at night: (Proxy-Rptd) (P) Moderate  15. Lack of a good night's sleep: (Proxy-Rptd) (P) Moderate  16. Wake up tired: (Proxy-Rptd) (P) Mild or slight  17. Fatigue: (Proxy-Rptd) (P) Moderate  18. Reduced Productivity: (Proxy-Rptd) (P) Mild or slight  19. Reduced Concentration: (Proxy-Rptd) (P) Mild or slight  20. Frustrated/restless/irritable: (Proxy-Rptd) (P) Mild or slight  21. Sad: (Proxy-Rptd) (P) Mild or slight  22. Embarrassed: (Proxy-Rptd) (P) None    Total Score: (Proxy-Rptd) (P) 37    COPYRIGHT 1996. [facility] [place] [facility],[place]        REVIEW OF SYSTEMS    Review of Systems as per HPI and PMHx, otherwise 10 system review system are negative.       ALLERGIES    Patient has no known allergies.    CURRENT MEDICATIONS      Current Outpatient Medications:      acetaminophen (TYLENOL) 500 MG tablet, [ACETAMINOPHEN (TYLENOL) 500 MG TABLET] Take 500 mg by mouth every 6 (six) hours as needed for pain., Disp: , Rfl:      azelastine (ASTELIN) 0.1 % nasal spray, 2 sprays each nostril 1-2x daily as needed for nasal congestion (use nightly for first 2 week), Disp: 30 mL, Rfl: 11     coenzyme Q-10 (CO-Q10) 50 MG capsule, Take 2 capsules (100 mg) by mouth daily., Disp: 60 capsule, Rfl: 0     Elemental iron 65 mg Vitamin C 125 mg (VITRON C)  MG TABS tablet, Take 1 tablet by mouth daily., Disp: 90 tablet, Rfl: 0     escitalopram (LEXAPRO) 5 MG tablet, Take 1 tablet (5 mg) by mouth daily., Disp: 90 tablet, Rfl: 3     fluticasone (FLONASE) 50 MCG/ACT nasal spray, Spray 1 spray into both nostrils daily., Disp: , Rfl:      ibuprofen  (ADVIL/MOTRIN) 200 MG tablet, Take 200-400 mg by mouth every 6 hours as needed, Disp: , Rfl:      magnesium oxide (MAG-OX) 400 MG tablet, Take 1 tablet (400 mg) by mouth daily., Disp: 360 tablet, Rfl: 1     methylphenidate (RITALIN) 5 MG tablet, Take 1 tablet (5 mg) by mouth daily., Disp: 30 tablet, Rfl: 0     multivitamin therapeutic tablet, [MULTIVITAMIN THERAPEUTIC TABLET] Take 1 tablet by mouth daily., Disp: , Rfl:      paragard intrauterine copper device, 1 each by Intrauterine route once., Disp: , Rfl:      SUMAtriptan (IMITREX) 50 MG tablet, Take 1 tablet (50 mg) by mouth at onset of headache for migraine. May repeat in 2 hours. Max 4 tablets/24 hours., Disp: 30 tablet, Rfl: 1     tretinoin (RETIN-A) 0.05 % external cream, Apply topically At Bedtime, Disp: 45 g, Rfl: 3     vitamin D3 (CHOLECALCIFEROL) 50 mcg (2000 units) tablet, Take 1 tablet by mouth daily, Disp: , Rfl:      PAST MEDICAL HISTORY    PAST MEDICAL HISTORY:   Past Medical History:   Diagnosis Date     Adjustment disorder with anxious mood 03/08/2021     Aneurysm (H)      Anxiety 06/22/2020     Depression        PAST SURGICAL HISTORY    PAST SURGICAL HISTORY: No past surgical history on file.    FAMILY  HISTORY    FAMILY HISTORY:   Family History   Problem Relation Age of Onset     Hypertension Mother      Diabetes Father      Cerebrovascular Disease Father      Hypertension Father      Depression Brother      Breast Cancer No family hx of      Ovarian Cancer No family hx of        SOCIAL HISTORY    SOCIAL HISTORY:   Social History     Tobacco Use     Smoking status: Never     Passive exposure: Never     Smokeless tobacco: Never   Substance Use Topics     Alcohol use: Not Currently        PHYSICAL EXAM    HEAD: Normal appearance and symmetry:  No cutaneous lesions.      NECK:  supple     EARS:    Right:   TM intact nl    LEFT:   TM intact nl    EYES:  EOMI    CN VII/XII:  intact     NOSE:     Dorsum:   straight  Septum:  midline  Mucosa:  moist         ORAL CAVITY/OROPHARYNX:     Lips:  Normal.  Tongue: normal, midline  Mucosa:   no lesions     NECK:  Trachea:  midline.              Thyroid:  normal              Adenopathy:  none        NEURO:   Alert and Oriented     GAIT AND STATION:  normal     RESPIRATORY:   Symmetry and Respiratory effort     PSYCH:  Normal mood and affect     SKIN:   warm and dry         IMPRESSION:    Encounter Diagnoses   Name Primary?     Tinnitus, bilateral Yes     Normal hearing noted on examination      Snores      Migraine without aura and without status migrainosus, not intractable      Chronic rhinitis      Ear fullness, bilateral         RECOMMENDATIONS:    Orders Placed This Encounter   Medications     paragard intrauterine copper device     Si each by Intrauterine route once.     fluticasone (FLONASE) 50 MCG/ACT nasal spray     Sig: Spray 1 spray into both nostrils daily.     magnesium oxide (MAG-OX) 400 MG tablet     Sig: Take 1 tablet (400 mg) by mouth daily.     Dispense:  360 tablet     Refill:  1     azelastine (ASTELIN) 0.1 % nasal spray     Si sprays each nostril 1-2x daily as needed for nasal congestion (use nightly for first 2 week)     Dispense:  30 mL     Refill:  11             Again, thank you for allowing me to participate in the care of your patient.        Sincerely,        Aren Jimenez MD

## 2024-12-12 ENCOUNTER — VIRTUAL VISIT (OUTPATIENT)
Dept: BEHAVIORAL HEALTH | Facility: CLINIC | Age: 44
End: 2024-12-12
Payer: COMMERCIAL

## 2024-12-12 DIAGNOSIS — F41.1 GENERALIZED ANXIETY DISORDER: Primary | ICD-10-CM

## 2024-12-12 NOTE — PROGRESS NOTES
[facility]                                     Progress Note    Patient Name: Anahi Stockton  Date: 12/12/2024         Service Type: Individual      Session Start Time: 10:00am  Session End Time: 10:53am     Session Length: 53 minutes    Session #: 7    Attendees: Client attended alone    Service Modality:  Video Visit:      Provider verified identity through the following two step process.  Patient provided:  Patient is known previously to provider    Telemedicine Visit: The patient's condition can be safely assessed and treated via synchronous audio and visual telemedicine encounter.      Reason for Telemedicine Visit: Patient has requested telehealth visit    Originating Site (Patient Location): Patient's home    Distant Site (Provider Location): Provider Remote Setting- Home Office    Consent:  The patient/guardian has verbally consented to: the potential risks and benefits of telemedicine (video visit) versus in person care; bill my insurance or make self-payment for services provided; and responsibility for payment of non-covered services.     Patient would like the video invitation sent by:  My Chart    Mode of Communication:  Video Conference via [facility]    Distant Location (Provider):  Off-site    As the provider I attest to compliance with applicable laws and regulations related to telemedicine.    DATA  Extended Session (53+ minutes): PROLONGED SERVICE IN THE OUTPATIENT SETTING REQUIRING DIRECT (FACE-TO-FACE) PATIENT CONTACT BEYOND THE USUAL SERVICE:    - Treatment protocol required additional time to complete, due to the nature of diagnosis being treated.  See Interventions section for details  Interactive Complexity: No  Crisis: No        Progress Since Last Session (Related to Symptoms / Goals / Homework):   Symptoms: No change anxiety    Homework: Achieved / completed to satisfaction      Episode of Care Goals: Satisfactory progress - ACTION (Actively working towards change);  "Intervened by reinforcing change plan / affirming steps taken     Current / Ongoing Stressors and Concerns:   Patient provides a status update while therapist utilizes reflective listening skills. Patient reports that she is trying to manage symptoms of chronic fatigue. The enzymes and supplements do seem to make a bit of a difference. She feels frustrating with how she's been feeling. She reports that the cold weather worsens her fibromyalgia. She identifies the origins of her anxiety back to teenage years. She reports that there are times when she feels \"emotionally lonely,\" like others just don't understand what she's going through.      Treatment Objective(s) Addressed in This Session:   identify the fears / thoughts that contribute to feeling anxious  Gain insight      Intervention:   Discussed mindfulness as being aware of what we are experiencing while we are experiencing it.  Contrasted this with avoidance and rumination.  Explored the role of mindfulness as an overall coping strategy for managing depression, anxiety, and strong emotions.  Led client in a guided mindfulness exercise focusing on sensations, images, feelings, and thoughts.  Discussed mindfulness as a tool to intentionally shift our awareness and focus as needed. Discussed importance of taking breaks and restorative practices. Reviewed thought re-framing practices. Therapist provided unconditional positive regard and supportive counseling.       Assessments completed prior to visit:  The following assessments were completed by patient for this visit:  PHQ9:       3/4/2024     8:36 AM 4/4/2024     8:43 AM 5/14/2024     9:49 PM 7/17/2024     7:03 PM 9/8/2024     2:34 PM 10/9/2024     7:11 PM 11/6/2024     7:19 PM   PHQ-9 SCORE   PHQ-9 Total Score MyChart  7 (Mild depression) 7 (Mild depression) 11 (Moderate depression) 7 (Mild depression) 13 (Moderate depression) 15 (Moderately severe depression)   PHQ-9 Total Score 19 7 7    7 11 7 13 15        " Patient-reported    Multiple values from one day are sorted in reverse-chronological order     GAD7:       4/1/2024     9:34 AM 4/4/2024     8:43 AM 5/2/2024    10:49 AM 5/22/2024     2:34 PM 7/17/2024     7:03 PM 10/9/2024     7:12 PM 11/6/2024     7:20 PM   MING-7 SCORE   Total Score 16 (severe anxiety) 6 (mild anxiety) 11 (moderate anxiety) 9 (mild anxiety) 7 (mild anxiety) 14 (moderate anxiety) 9 (mild anxiety)   Total Score 16 6 11 9 7 14 9        Patient-reported     PROMIS 10-Global Health (only subscores and total score):       4/4/2024     8:39 AM 5/2/2024    10:51 AM 7/17/2024     6:59 PM 11/13/2024     6:46 PM 12/11/2024     7:16 PM   PROMIS-10 Scores Only   Global Mental Health Score 13 12 11 11  10    Global Physical Health Score 12 12 12 11  11    PROMIS TOTAL - SUBSCORES 25 24 23 22  21        Patient-reported         ASSESSMENT: Current Emotional / Mental Status (status of significant symptoms):   Risk status (Self / Other harm or suicidal ideation)   Patient denies current fears or concerns for personal safety.   Patient denies current or recent suicidal ideation or behaviors.   Patient denies current or recent homicidal ideation or behaviors.   Patient denies current or recent self injurious behavior or ideation.   Patient denies other safety concerns.   Patient reports there has been no change in risk factors since their last session.     Patient reports there has been no change in protective factors since their last session.     Recommended that patient call 911 or go to the local ED should there be a change in any of these risk factors     Appearance:   Appropriate    Eye Contact:   Good    Psychomotor Behavior: Normal    Attitude:   Cooperative    Orientation:   All   Speech    Rate / Production: Normal/ Responsive Normal     Volume:  Normal    Mood:    Anxious  Depressed    Affect:    Appropriate  Worrisome    Thought Content:  Clear    Thought Form:  Coherent  Logical    Insight:    Good       Medication Review:   No changes to current psychiatric medication(s)     Medication Compliance:   Yes     Changes in Health Issues:   None reported     Chemical Use Review:   Substance Use: Chemical use reviewed, no active concerns identified      Tobacco Use: No current tobacco use.      Diagnosis:  1. Generalized anxiety disorder    R/O MDD    Collateral Reports Completed:   Not Applicable    PLAN: (Patient Tasks / Therapist Tasks / Other)  Return for a virtual visit in January  Patient encouraged to practice grounding techniques   Patient encouraged to practice self-compassion and thought re-framing   Patient encouraged to consider a gratitude journal  Patient encouraged to use a journal to write out her feelings in heavy moments    There has been demonstrated improvement in functioning while patient has been engaged in psychotherapy/psychological service- if withdrawn the patient would deteriorate and/or relapse.           Carolyne Figueredo, [facility]                                                         ______________________________________________________________________    Individual Treatment Plan    Patient's Name: Anahi Stockton  YOB: 1980    Date of Creation: 5/2/2024  Date Treatment Plan Last Reviewed/Revised: 11/14/2024    DSM5 Diagnoses: 300.02 (F41.1) Generalized Anxiety Disorder  Psychosocial / Contextual Factors: 44 year old female, , mother of 2 girls  PROMIS (reviewed every 90 days):   PROMIS 10-Global Health (only subscores and total score):       4/4/2024     8:39 AM 5/2/2024    10:51 AM 7/17/2024     6:59 PM 11/13/2024     6:46 PM   PROMIS-10 Scores Only   Global Mental Health Score 13 12 11 11    Global Physical Health Score 12 12 12 11    PROMIS TOTAL - SUBSCORES 25 24 23 22        Referral / Collaboration:  Referral to another professional/service is not indicated at this time..    Anticipated number of session for this episode of care: 3-6 sessions  Anticipation  frequency of session: Monthly  Anticipated Duration of each session: 53 or more minutes  Treatment plan will be reviewed in 90 days or when goals have been changed.       MeasurableTreatment Goal(s) related to diagnosis / functional impairment(s)  Goal 1: Patient will manage symptoms of anxiety as evidenced by a MING-7 score of 5 or lower    I will know I've met my goal when I feel more confident.      Objective #A (Patient Action)    Patient will identify the fears / thoughts that contribute to feeling anxious.  Status: Continued - Date(s): 11/14/2024     Intervention(s)  Therapist will teach emotional recognition/identification.   .    Objective #B  Patient will use relaxation strategies multiple times per day to reduce the physical symptoms of anxiety.  Status: Continued - Date(s): 11/14/2024     Intervention(s)  Therapist will  teach relaxation strategies .    Objective #C  Patient will use thought-stopping strategy daily to reduce intrusive thoughts.  Status: Continued - Date(s): 11/14/2024     Intervention(s)  Therapist will  teach thought stopping and other CBT strategies to manage thoughts .      Patient has reviewed and agreed to the above plan.      Carolyne Figueredo, [facility]  November 14, 2024

## 2025-01-09 ENCOUNTER — IMMUNIZATION (OUTPATIENT)
Dept: FAMILY MEDICINE | Facility: CLINIC | Age: 45
End: 2025-01-09
Payer: COMMERCIAL

## 2025-01-16 ENCOUNTER — VIRTUAL VISIT (OUTPATIENT)
Dept: BEHAVIORAL HEALTH | Facility: CLINIC | Age: 45
End: 2025-01-16
Payer: COMMERCIAL

## 2025-01-16 DIAGNOSIS — F41.1 GENERALIZED ANXIETY DISORDER: Primary | ICD-10-CM

## 2025-01-16 ASSESSMENT — ANXIETY QUESTIONNAIRES
7. FEELING AFRAID AS IF SOMETHING AWFUL MIGHT HAPPEN: SEVERAL DAYS
8. IF YOU CHECKED OFF ANY PROBLEMS, HOW DIFFICULT HAVE THESE MADE IT FOR YOU TO DO YOUR WORK, TAKE CARE OF THINGS AT HOME, OR GET ALONG WITH OTHER PEOPLE?: SOMEWHAT DIFFICULT
GAD7 TOTAL SCORE: 7
IF YOU CHECKED OFF ANY PROBLEMS ON THIS QUESTIONNAIRE, HOW DIFFICULT HAVE THESE PROBLEMS MADE IT FOR YOU TO DO YOUR WORK, TAKE CARE OF THINGS AT HOME, OR GET ALONG WITH OTHER PEOPLE: SOMEWHAT DIFFICULT
GAD7 TOTAL SCORE: 7
3. WORRYING TOO MUCH ABOUT DIFFERENT THINGS: SEVERAL DAYS
1. FEELING NERVOUS, ANXIOUS, OR ON EDGE: SEVERAL DAYS
5. BEING SO RESTLESS THAT IT IS HARD TO SIT STILL: NOT AT ALL
2. NOT BEING ABLE TO STOP OR CONTROL WORRYING: SEVERAL DAYS
6. BECOMING EASILY ANNOYED OR IRRITABLE: SEVERAL DAYS
1. FEELING NERVOUS, ANXIOUS, OR ON EDGE: SEVERAL DAYS
GAD7 TOTAL SCORE: 7
4. TROUBLE RELAXING: MORE THAN HALF THE DAYS
2. NOT BEING ABLE TO STOP OR CONTROL WORRYING: SEVERAL DAYS
7. FEELING AFRAID AS IF SOMETHING AWFUL MIGHT HAPPEN: SEVERAL DAYS
6. BECOMING EASILY ANNOYED OR IRRITABLE: SEVERAL DAYS
7. FEELING AFRAID AS IF SOMETHING AWFUL MIGHT HAPPEN: SEVERAL DAYS
GAD7 TOTAL SCORE: 7
4. TROUBLE RELAXING: MORE THAN HALF THE DAYS
GAD7 TOTAL SCORE: 7
IF YOU CHECKED OFF ANY PROBLEMS ON THIS QUESTIONNAIRE, HOW DIFFICULT HAVE THESE PROBLEMS MADE IT FOR YOU TO DO YOUR WORK, TAKE CARE OF THINGS AT HOME, OR GET ALONG WITH OTHER PEOPLE: SOMEWHAT DIFFICULT
3. WORRYING TOO MUCH ABOUT DIFFERENT THINGS: SEVERAL DAYS
8. IF YOU CHECKED OFF ANY PROBLEMS, HOW DIFFICULT HAVE THESE MADE IT FOR YOU TO DO YOUR WORK, TAKE CARE OF THINGS AT HOME, OR GET ALONG WITH OTHER PEOPLE?: SOMEWHAT DIFFICULT
5. BEING SO RESTLESS THAT IT IS HARD TO SIT STILL: NOT AT ALL

## 2025-01-16 NOTE — PROGRESS NOTES
[facility]                                     Progress Note    Patient Name: Anahi Stockton  Date: 1/16/2025         Service Type: Individual      Session Start Time: 10:00am  Session End Time: 10:53am     Session Length: 53 minutes    Session #: 8    Attendees: Client attended alone    Service Modality:  Video Visit:      Provider verified identity through the following two step process.  Patient provided:  Patient is known previously to provider    Telemedicine Visit: The patient's condition can be safely assessed and treated via synchronous audio and visual telemedicine encounter.      Reason for Telemedicine Visit: Patient has requested telehealth visit    Originating Site (Patient Location): Patient's home    Distant Site (Provider Location): Provider Remote Setting- Home Office    Consent:  The patient/guardian has verbally consented to: the potential risks and benefits of telemedicine (video visit) versus in person care; bill my insurance or make self-payment for services provided; and responsibility for payment of non-covered services.     Patient would like the video invitation sent by:  My Chart    Mode of Communication:  Video Conference via [facility]    Distant Location (Provider):  Off-site    As the provider I attest to compliance with applicable laws and regulations related to telemedicine.    DATA  Extended Session (53+ minutes): PROLONGED SERVICE IN THE OUTPATIENT SETTING REQUIRING DIRECT (FACE-TO-FACE) PATIENT CONTACT BEYOND THE USUAL SERVICE:    - Treatment protocol required additional time to complete, due to the nature of diagnosis being treated.  See Interventions section for details  Interactive Complexity: No  Crisis: No        Progress Since Last Session (Related to Symptoms / Goals / Homework):   Symptoms: No change anxiety    Homework: Achieved / completed to satisfaction      Episode of Care Goals: Satisfactory progress - ACTION (Actively working towards change);  Intervened by reinforcing change plan / affirming steps taken     Current / Ongoing Stressors and Concerns:   Patient provides a status update while therapist utilizes reflective listening skills. She shares about positive family time over the holidays. She started taking a new medication and has noticed some benefits in increased energy but there are other side effects. She reports that fatigue is still a big issue and she has an appt next week with her doctor. She reports feeling sad about problems in her relationship with her . She is afraid that he will just cut her out and leave the relationship. She is having trouble sitting with the discomfort.      Treatment Objective(s) Addressed in This Session:   identify the fears / thoughts that contribute to feeling anxious  Gain insight      Intervention:   Discussed mindfulness as being aware of what we are experiencing while we are experiencing it.  Contrasted this with avoidance and rumination.  Explored the role of mindfulness as an overall coping strategy for managing depression, anxiety, and strong emotions.  Led client in a guided mindfulness exercise focusing on sensations, images, feelings, and thoughts.  Discussed mindfulness as a tool to intentionally shift our awareness and focus as needed. Discussed importance of taking breaks and restorative practices. Reviewed thought re-framing practices. Therapist provided unconditional positive regard and supportive counseling.       Assessments completed prior to visit:  The following assessments were completed by patient for this visit:  PHQ9:       3/4/2024     8:36 AM 4/4/2024     8:43 AM 5/14/2024     9:49 PM 7/17/2024     7:03 PM 9/8/2024     2:34 PM 10/9/2024     7:11 PM 11/6/2024     7:19 PM   PHQ-9 SCORE   PHQ-9 Total Score MyChart  7 (Mild depression) 7 (Mild depression) 11 (Moderate depression) 7 (Mild depression) 13 (Moderate depression) 15 (Moderately severe depression)   PHQ-9 Total Score 19 7 7     7 11 7 13 15        Patient-reported     GAD7:       4/4/2024     8:43 AM 5/2/2024    10:49 AM 5/22/2024     2:34 PM 7/17/2024     7:03 PM 10/9/2024     7:12 PM 11/6/2024     7:20 PM 1/16/2025     9:55 AM   MING-7 SCORE   Total Score 6 (mild anxiety) 11 (moderate anxiety) 9 (mild anxiety) 7 (mild anxiety) 14 (moderate anxiety) 9 (mild anxiety) 7 (mild anxiety)   Total Score 6 11 9 7 14 9  7        Patient-reported     PROMIS 10-Global Health (only subscores and total score):       4/4/2024     8:39 AM 5/2/2024    10:51 AM 7/17/2024     6:59 PM 11/13/2024     6:46 PM 12/11/2024     7:16 PM   PROMIS-10 Scores Only   Global Mental Health Score 13 12 11 11  10    Global Physical Health Score 12 12 12 11  11    PROMIS TOTAL - SUBSCORES 25 24 23 22  21        Patient-reported         ASSESSMENT: Current Emotional / Mental Status (status of significant symptoms):   Risk status (Self / Other harm or suicidal ideation)   Patient denies current fears or concerns for personal safety.   Patient denies current or recent suicidal ideation or behaviors.   Patient denies current or recent homicidal ideation or behaviors.   Patient denies current or recent self injurious behavior or ideation.   Patient denies other safety concerns.   Patient reports there has been no change in risk factors since their last session.     Patient reports there has been no change in protective factors since their last session.     Recommended that patient call 911 or go to the local ED should there be a change in any of these risk factors     Appearance:   Appropriate    Eye Contact:   Good    Psychomotor Behavior: Normal    Attitude:   Cooperative    Orientation:   All   Speech    Rate / Production: Normal     Volume:  Normal    Mood:    Anxious  Depressed    Affect:    Appropriate  Worrisome    Thought Content:  Clear    Thought Form:  Coherent  Logical    Insight:    Good      Medication Review:   No changes to current psychiatric  medication(s)     Medication Compliance:   Yes     Changes in Health Issues:   None reported     Chemical Use Review:   Substance Use: Chemical use reviewed, no active concerns identified      Tobacco Use: No current tobacco use.      Diagnosis:  1. Generalized anxiety disorder    R/O MDD    Collateral Reports Completed:   Not Applicable    PLAN: (Patient Tasks / Therapist Tasks / Other)  Return for a virtual visit in 1 month  Patient encouraged to continue participating in vulnerable conversations   Patient encouraged to practice grounding techniques   Patient encouraged to practice self-compassion and thought re-framing   Patient encouraged to consider a gratitude journal  Patient encouraged to use a journal to write out her feelings in heavy moments    There has been demonstrated improvement in functioning while patient has been engaged in psychotherapy/psychological service- if withdrawn the patient would deteriorate and/or relapse.           Carolyne Figueredo, [facility]                                                         ______________________________________________________________________    Individual Treatment Plan    Patient's Name: Anahi Stockton  YOB: 1980    Date of Creation: 5/2/2024  Date Treatment Plan Last Reviewed/Revised: 11/14/2024    DSM5 Diagnoses: 300.02 (F41.1) Generalized Anxiety Disorder  Psychosocial / Contextual Factors: 44 year old female, , mother of 2 girls  PROMIS (reviewed every 90 days):   PROMIS 10-Global Health (only subscores and total score):       4/4/2024     8:39 AM 5/2/2024    10:51 AM 7/17/2024     6:59 PM 11/13/2024     6:46 PM   PROMIS-10 Scores Only   Global Mental Health Score 13 12 11 11    Global Physical Health Score 12 12 12 11    PROMIS TOTAL - SUBSCORES 25 24 23 22        Referral / Collaboration:  Referral to another professional/service is not indicated at this time..    Anticipated number of session for this episode of care: 3-6  sessions  Anticipation frequency of session: Monthly  Anticipated Duration of each session: 53 or more minutes  Treatment plan will be reviewed in 90 days or when goals have been changed.       MeasurableTreatment Goal(s) related to diagnosis / functional impairment(s)  Goal 1: Patient will manage symptoms of anxiety as evidenced by a MING-7 score of 5 or lower    I will know I've met my goal when I feel more confident.      Objective #A (Patient Action)    Patient will identify the fears / thoughts that contribute to feeling anxious.  Status: Continued - Date(s): 11/14/2024     Intervention(s)  Therapist will teach emotional recognition/identification.   .    Objective #B  Patient will use relaxation strategies multiple times per day to reduce the physical symptoms of anxiety.  Status: Continued - Date(s): 11/14/2024     Intervention(s)  Therapist will  teach relaxation strategies .    Objective #C  Patient will use thought-stopping strategy daily to reduce intrusive thoughts.  Status: Continued - Date(s): 11/14/2024     Intervention(s)  Therapist will  teach thought stopping and other CBT strategies to manage thoughts .      Patient has reviewed and agreed to the above plan.      Carolyne Figueredo, [facility]  November 14, 2024

## 2025-01-22 SDOH — SOCIAL STABILITY: SOCIAL NETWORK: I HAVE TROUBLE DOING ALL OF THE ACTIVITIES WITH FRIENDS THAT I WANT TO DO: ALWAYS

## 2025-01-22 SDOH — SOCIAL STABILITY: SOCIAL NETWORK: I HAVE TROUBLE DOING ALL OF MY USUAL WORK (INCLUDE WORK AT HOME): ALWAYS

## 2025-01-22 SDOH — SOCIAL STABILITY: SOCIAL NETWORK: I HAVE TROUBLE DOING ALL OF MY REGULAR LEISURE ACTIVITIES WITH OTHERS: ALWAYS

## 2025-01-22 SDOH — SOCIAL STABILITY: SOCIAL NETWORK: PROMIS ABILITY TO PARTICIPATE IN SOCIAL ROLES & ACTIVITIES T-SCORE: 29

## 2025-01-22 SDOH — SOCIAL STABILITY: SOCIAL NETWORK

## 2025-01-22 SDOH — SOCIAL STABILITY: SOCIAL NETWORK: I HAVE TROUBLE DOING ALL OF THE FAMILY ACTIVITIES THAT I WANT TO DO: ALWAYS

## 2025-01-23 ENCOUNTER — VIRTUAL VISIT (OUTPATIENT)
Dept: PHYSICAL MEDICINE AND REHAB | Facility: CLINIC | Age: 45
End: 2025-01-23
Payer: COMMERCIAL

## 2025-01-23 VITALS — WEIGHT: 142 LBS | HEIGHT: 66 IN | BODY MASS INDEX: 22.82 KG/M2

## 2025-01-23 DIAGNOSIS — G93.31 POST VIRAL SYNDROME: ICD-10-CM

## 2025-01-23 DIAGNOSIS — R53.82 CHRONIC FATIGUE: Primary | ICD-10-CM

## 2025-01-23 ASSESSMENT — PAIN SCALES - GENERAL: PAINLEVEL_OUTOF10: MODERATE PAIN (4)

## 2025-01-23 NOTE — NURSING NOTE
Current [address] [place]  [place] [address]    Is the patient currently in the state of [place]? YES    Visit mode: VIDEO    If the visit is dropped, the patient can be reconnected by:VIDEO VISIT: Text to cell phone:   Telephone Information:   Mobile [phone]       Will anyone else be joining the visit? NO  (If patient encounters technical issues they should call [phone]    Are changes needed to the allergy or medication list? No    Are refills needed on medications prescribed by this physician? NO    Rooming Documentation:  Questionnaire(s) completed    Reason for visit: Follow Up    [facility]cd[facility]    Depression Response    Patient completed the PHQ-9 assessment for depression and scored >9? Yes  Question 9 on the PHQ-9 was positive for suicidality? No  Does patient have current mental health provider? Yes    Is this a virtual visit? Yes   Does patient have suicidal ideation (positive question 9)? No - offer to place Mental Health Referral.  Patient declined referral/not needed    I personally notified the following: visit provider

## 2025-01-23 NOTE — PROGRESS NOTES
Anahi Stockton is a 44 year old female who presents to be evaluated for a billable video visit.    Video-Visit Details    Video visit Start time: 10:19 AM    Type of service:  Video Visit    Video End Time: 10:35 AM    Originating Location (pt. Location): Home    Distant Location (provider location):  Off- Site    Platform used for Video Visit: Wei    Assessment/ Impression:   1. Chronic fatigue  Patient with significant chronic fatigue since viral infection October 2019.  Discussed due to increased somnolence upon waking she may require sleep medicine evaluation and to monitor sleep.  Encouraged to continue with COQ10 and Iron replacement. Discussed also low-dose naltrexone due to history of fibromyalgia starting at 1 mg daily.  Discussed side effects of anxiety, sleep disturbance, and GI upset.    - COMPOUNDED NON-CONTROLLED SUBSTANCE (CMPD RX) - PHARMACY TO MIX COMPOUNDED MEDICATION; Take 1 mg tablet  of Low dose naltrexone daily.  Dispense: 30 tablet; Refill: 1    2. Post viral syndrome (Primary)  Discussed COVID and Post COVID with patient.  Educational materials provided and all questions answered.  As symptoms started prior to COVID believe this is more postviral syndrome.          Plan:  I reviewed present knowledge on long-Covid.  Education was provided and question were answered.  Orders/Referrals as above  Will advised patient on test results  I will follow up with Anahi Stockton in 2 months. I will review progress and consider need for any other therapeutic interventions. If there are any questions and/or concerns she will call the clinic.      On day of encounter time spent in chart review and with patient in consultation, exam, education, coordination of care, review of outside charts/data and documentation:  25 minutes     I have attempted to proof read for major spelling errors and apologize for any minor errors I may have missed.      This note was dictated using voice recognition software. Any  "grammatical or context distortions are unintentional and inherent to the software.    _____________  TESSA Negro [facility] PHYSICAL MEDICINE AND REHABILITATION CLINIC [place]    Subjective   Initial 11/7/24  This 44 year old female presents to the [facility] Rehabilitation Medicine Post-COVID clinic as a new consult to evaluate continuing symptoms after COVID infection initially diagnosed 8/23/23.  Anahi Stockton presented to their primary care physician on 8/23/23 complaining of  Sore throat, fever, generalized aches and pains, fatigue, and weakness. Treatment was Paxlovid.   Prior to above COVID in fall of 2019 had a \"bad Cold\" that took her a month to recover. Anahi Stockton experienced complications of fatigue.  A few weeks after she got tingling/weakness in hands and legs.  In December of 2019 and was admitted to the hospital for evaluation of acute right side weakness and blurred vision. She was found to have an incidental Left MCA trifurcation aneurysm. . She was also getting headaches.   She was seen and had testing done through Neurology and Rheumatology and was ultimately diagnosed with Fibromyalgia. She has trailed several medications but was unable to continue due to side effects.  She is tired when she wakes up and will feel worse by lunch. She has some energy and then will feel exhausted by end of day. She did decrease work to 30 hours.   She does have some nights were she wakes up at 2am.  She does have a history of snoring.   She has enough energy to make it through work but is not present at night due to her significant fatigue.     Today 01/23/25  Patient has noticed cold weather makes symptoms worse.  She noticed her body is retaining  more water.  Her primary care did recommend ritalin in September and did try this.  She noticed she does get energy for 5 hours.  Her insurance is not covering occupational therapy.  She is working with her therapist.   She is " still working reduced schedule at 30 hours.  She does notice her sleep is better as well.       1/22/2025     6:59 PM   PHQ Assesment Total Score(s)   PHQ-9 Score 15        Patient-reported           1/16/2025     9:55 AM   MING-7 Results   MING 7 TOTAL SCORE 7 (mild anxiety)   MING-7 Total Score 7        Patient-reported         1/22/2025     7:00 PM   PTSD Screen Score   Have you ever experienced this kind of event? No         1/22/2025     7:04 PM   PROMIS-29   PROMIS Physical Function T-Score 39 (moderate dysfunction)   PROMIS Anxiety T-Score 63 (moderate)   PROMIS Depression T-Score 59 (mild)   PROMIS Fatigue T-Score 67 (moderate)   PROMIS Sleep Disturbance T-Score 48 (within normal limits)   PROMIS Ability to Participate in Social Roles & Activities T-Score 29 (severe dysfunction)   PROMIS Pain Interference T-Score 65 (moderate)   PROMIS Pain Intensity 4       Past Medical History:   Diagnosis Date    Adjustment disorder with anxious mood 03/08/2021    Aneurysm     Anxiety 06/22/2020    Depression        No past surgical history on file.    Family History   Problem Relation Age of Onset    Hypertension Mother     Diabetes Father     Cerebrovascular Disease Father     Hypertension Father     Depression Brother     Breast Cancer No family hx of     Ovarian Cancer No family hx of        Social History     Tobacco Use    Smoking status: Never     Passive exposure: Never    Smokeless tobacco: Never   Vaping Use    Vaping status: Never Used   Substance Use Topics    Alcohol use: Not Currently    Drug use: Never         Current Outpatient Medications:     acetaminophen (TYLENOL) 500 MG tablet, [ACETAMINOPHEN (TYLENOL) 500 MG TABLET] Take 500 mg by mouth every 6 (six) hours as needed for pain., Disp: , Rfl:     azelastine (ASTELIN) 0.1 % nasal spray, 2 sprays each nostril 1-2x daily as needed for nasal congestion (use nightly for first 2 week), Disp: 30 mL, Rfl: 11    Elemental iron 65 mg Vitamin C 125 mg (VITRON C)   MG TABS tablet, Take 1 tablet by mouth daily., Disp: 90 tablet, Rfl: 0    escitalopram (LEXAPRO) 5 MG tablet, Take 1 tablet (5 mg) by mouth daily., Disp: 90 tablet, Rfl: 3    ibuprofen (ADVIL/MOTRIN) 200 MG tablet, Take 200-400 mg by mouth every 6 hours as needed, Disp: , Rfl:     magnesium oxide (MAG-OX) 400 MG tablet, Take 1 tablet (400 mg) by mouth daily., Disp: 360 tablet, Rfl: 1    methylphenidate (RITALIN) 5 MG tablet, Take 1 tablet (5 mg) by mouth 2 times daily., Disp: 60 tablet, Rfl: 0    [START ON 2/6/2025] methylphenidate (RITALIN) 5 MG tablet, Take 1 tablet (5 mg) by mouth 2 times daily., Disp: 60 tablet, Rfl: 0    [START ON 3/6/2025] methylphenidate (RITALIN) 5 MG tablet, Take 1 tablet (5 mg) by mouth 2 times daily., Disp: 60 tablet, Rfl: 0    multivitamin therapeutic tablet, [MULTIVITAMIN THERAPEUTIC TABLET] Take 1 tablet by mouth daily., Disp: , Rfl:     paragard intrauterine copper device, 1 each by Intrauterine route once., Disp: , Rfl:     SUMAtriptan (IMITREX) 50 MG tablet, Take 1 tablet (50 mg) by mouth at onset of headache for migraine. May repeat in 2 hours. Max 4 tablets/24 hours., Disp: 30 tablet, Rfl: 1    tretinoin (RETIN-A) 0.05 % external cream, Apply topically At Bedtime, Disp: 45 g, Rfl: 3    vitamin D3 (CHOLECALCIFEROL) 50 mcg (2000 units) tablet, Take 1 tablet by mouth daily, Disp: , Rfl:     Review of Systems   Constitutional, HEENT, cardiovascular, pulmonary, GI, , musculoskeletal, neuro, skin, endocrine and psych systems are negative, except as otherwise noted.      Objective    Vitals:  No vitals were obtained today due to virtual visit.    Physical Exam   EYES: Eyes grossly normal to inspection.  No discharge or erythema, or obvious scleral/conjunctival abnormalities.  SKIN: Visible skin clear. No significant rash, abnormal pigmentation or lesions.  NEURO: Cranial nerves grossly intact.  Mentation and speech appropriate for age.  GENERAL: Healthy, alert and no  distress  RESP: No audible wheeze, cough, or visible cyanosis.  No visible retractions or increased work of breathing.    PSYCH: Mentation appears normal, affect normal/bright, judgement and insight intact, normal speech and appearance well-groomed.    Labs:   Lab on 11/11/2024   Component Date Value Ref Range Status    Ferritin 11/11/2024 40  6 - 175 ng/mL Final    Vitamin B12 11/11/2024 720  232 - 1,245 pg/mL Final       Imaging:    I personally reviewed the following imaging results today and those on care everywhere, if indicated     MRA Brain ( [facility] [place]) wo Contrast  9/14/23  IMPRESSION:  1.  Stable appearance to a tiny infundibulum most likely in the left MCA trifurcation versus a very small aneurysm measuring 1-2 mm.    Reviewed imaging from [facility] sites     Medical Records Reviewed:    Reviewed consults/documents from [facility] including  Family Medicine, ENT and Behavioral Health

## 2025-01-23 NOTE — LETTER
1/23/2025       RE: Anahi Stockton  [address]  [place] [address]     Dear Colleague,    Thank you for referring your patient, Anahi Stockton, to the [facility] PHYSICAL MEDICINE AND REHABILITATION CLINIC [place] [facility]. Please see a copy of my visit note below.    Anahi Stockton is a 44 year old female who presents to be evaluated for a billable video visit.    Video-Visit Details    Video visit Start time: 10:19 AM    Type of service:  Video Visit    Video End Time: 10:35 AM    Originating Location (pt. Location): Home    Distant Location (provider location):  Off- Site    Platform used for Video Visit: [employer]    Assessment/ Impression:   1. Chronic fatigue  Patient with significant chronic fatigue since viral infection October 2019.  Discussed due to increased somnolence upon waking she may require sleep medicine evaluation and to monitor sleep.  Encouraged to continue with COQ10 and Iron replacement. Discussed also low-dose naltrexone due to history of fibromyalgia starting at 1 mg daily.  Discussed side effects of anxiety, sleep disturbance, and GI upset.    - COMPOUNDED NON-CONTROLLED SUBSTANCE (CMPD RX) - PHARMACY TO MIX COMPOUNDED MEDICATION; Take 1 mg tablet  of Low dose naltrexone daily.  Dispense: 30 tablet; Refill: 1    2. Post viral syndrome (Primary)  Discussed COVID and Post COVID with patient.  Educational materials provided and all questions answered.  As symptoms started prior to COVID believe this is more postviral syndrome.          Plan:  I reviewed present knowledge on long-Covid.  Education was provided and question were answered.  Orders/Referrals as above  Will advised patient on test results  I will follow up with Anahi Stockton in 2 months. I will review progress and consider need for any other therapeutic interventions. If there are any questions and/or concerns she will call the clinic.      On day of encounter  "time spent in chart review and with patient in consultation, exam, education, coordination of care, review of outside charts/data and documentation:  25 minutes     I have attempted to proof read for major spelling errors and apologize for any minor errors I may have missed.      This note was dictated using voice recognition software. Any grammatical or context distortions are unintentional and inherent to the software.    _____________  Amisha Gautam PA-C  [facility] PHYSICAL MEDICINE AND REHABILITATION CLINIC [place]    Subjective   Initial 11/7/24  This 44 year old female presents to the [facility] Rehabilitation Medicine Post-COVID clinic as a new consult to evaluate continuing symptoms after COVID infection initially diagnosed 8/23/23.  Anahi Stockton presented to their primary care physician on 8/23/23 complaining of  Sore throat, fever, generalized aches and pains, fatigue, and weakness. Treatment was Paxlovid.   Prior to above COVID in fall of 2019 had a \"bad Cold\" that took her a month to recover. Anahi Stockton experienced complications of fatigue.  A few weeks after she got tingling/weakness in hands and legs.  In December of 2019 and was admitted to the hospital for evaluation of acute right side weakness and blurred vision. She was found to have an incidental Left MCA trifurcation aneurysm. . She was also getting headaches.   She was seen and had testing done through Neurology and Rheumatology and was ultimately diagnosed with Fibromyalgia. She has trailed several medications but was unable to continue due to side effects.  She is tired when she wakes up and will feel worse by lunch. She has some energy and then will feel exhausted by end of day. She did decrease work to 30 hours.   She does have some nights were she wakes up at 2am.  She does have a history of snoring.   She has enough energy to make it through work but is not present at night due to her significant " fatigue.     Today 01/23/25  Patient has noticed cold weather makes symptoms worse.  She noticed her body is retaining  more water.  Her primary care did recommend ritalin in September and did try this.  She noticed she does get energy for 5 hours.  Her insurance is not covering occupational therapy.  She is working with her therapist.   She is still working reduced schedule at 30 hours.  She does notice her sleep is better as well.       1/22/2025     6:59 PM   PHQ Assesment Total Score(s)   PHQ-9 Score 15        Patient-reported           1/16/2025     9:55 AM   MING-7 Results   MING 7 TOTAL SCORE 7 (mild anxiety)   MING-7 Total Score 7        Patient-reported         1/22/2025     7:00 PM   PTSD Screen Score   Have you ever experienced this kind of event? No         1/22/2025     7:04 PM   PROMIS-29   PROMIS Physical Function T-Score 39 (moderate dysfunction)   PROMIS Anxiety T-Score 63 (moderate)   PROMIS Depression T-Score 59 (mild)   PROMIS Fatigue T-Score 67 (moderate)   PROMIS Sleep Disturbance T-Score 48 (within normal limits)   PROMIS Ability to Participate in Social Roles & Activities T-Score 29 (severe dysfunction)   PROMIS Pain Interference T-Score 65 (moderate)   PROMIS Pain Intensity 4       Past Medical History:   Diagnosis Date     Adjustment disorder with anxious mood 03/08/2021     Aneurysm      Anxiety 06/22/2020     Depression        No past surgical history on file.    Family History   Problem Relation Age of Onset     Hypertension Mother      Diabetes Father      Cerebrovascular Disease Father      Hypertension Father      Depression Brother      Breast Cancer No family hx of      Ovarian Cancer No family hx of        Social History     Tobacco Use     Smoking status: Never     Passive exposure: Never     Smokeless tobacco: Never   Vaping Use     Vaping status: Never Used   Substance Use Topics     Alcohol use: Not Currently     Drug use: Never         Current Outpatient Medications:       acetaminophen (TYLENOL) 500 MG tablet, [ACETAMINOPHEN (TYLENOL) 500 MG TABLET] Take 500 mg by mouth every 6 (six) hours as needed for pain., Disp: , Rfl:      azelastine (ASTELIN) 0.1 % nasal spray, 2 sprays each nostril 1-2x daily as needed for nasal congestion (use nightly for first 2 week), Disp: 30 mL, Rfl: 11     Elemental iron 65 mg Vitamin C 125 mg (VITRON C)  MG TABS tablet, Take 1 tablet by mouth daily., Disp: 90 tablet, Rfl: 0     escitalopram (LEXAPRO) 5 MG tablet, Take 1 tablet (5 mg) by mouth daily., Disp: 90 tablet, Rfl: 3     ibuprofen (ADVIL/MOTRIN) 200 MG tablet, Take 200-400 mg by mouth every 6 hours as needed, Disp: , Rfl:      magnesium oxide (MAG-OX) 400 MG tablet, Take 1 tablet (400 mg) by mouth daily., Disp: 360 tablet, Rfl: 1     methylphenidate (RITALIN) 5 MG tablet, Take 1 tablet (5 mg) by mouth 2 times daily., Disp: 60 tablet, Rfl: 0     [START ON 2/6/2025] methylphenidate (RITALIN) 5 MG tablet, Take 1 tablet (5 mg) by mouth 2 times daily., Disp: 60 tablet, Rfl: 0     [START ON 3/6/2025] methylphenidate (RITALIN) 5 MG tablet, Take 1 tablet (5 mg) by mouth 2 times daily., Disp: 60 tablet, Rfl: 0     multivitamin therapeutic tablet, [MULTIVITAMIN THERAPEUTIC TABLET] Take 1 tablet by mouth daily., Disp: , Rfl:      paragard intrauterine copper device, 1 each by Intrauterine route once., Disp: , Rfl:      SUMAtriptan (IMITREX) 50 MG tablet, Take 1 tablet (50 mg) by mouth at onset of headache for migraine. May repeat in 2 hours. Max 4 tablets/24 hours., Disp: 30 tablet, Rfl: 1     tretinoin (RETIN-A) 0.05 % external cream, Apply topically At Bedtime, Disp: 45 g, Rfl: 3     vitamin D3 (CHOLECALCIFEROL) 50 mcg (2000 units) tablet, Take 1 tablet by mouth daily, Disp: , Rfl:     Review of Systems   Constitutional, HEENT, cardiovascular, pulmonary, GI, , musculoskeletal, neuro, skin, endocrine and psych systems are negative, except as otherwise noted.      Objective    Vitals:  No vitals  were obtained today due to virtual visit.    Physical Exam   EYES: Eyes grossly normal to inspection.  No discharge or erythema, or obvious scleral/conjunctival abnormalities.  SKIN: Visible skin clear. No significant rash, abnormal pigmentation or lesions.  NEURO: Cranial nerves grossly intact.  Mentation and speech appropriate for age.  GENERAL: Healthy, alert and no distress  RESP: No audible wheeze, cough, or visible cyanosis.  No visible retractions or increased work of breathing.    PSYCH: Mentation appears normal, affect normal/bright, judgement and insight intact, normal speech and appearance well-groomed.    Labs:   Lab on 11/11/2024   Component Date Value Ref Range Status     Ferritin 11/11/2024 40  6 - 175 ng/mL Final     Vitamin B12 11/11/2024 720  232 - 1,245 pg/mL Final       Imaging:    I personally reviewed the following imaging results today and those on care everywhere, if indicated     MRA Brain ( [facility] [place]) wo Contrast  9/14/23  IMPRESSION:  1.  Stable appearance to a tiny infundibulum most likely in the left MCA trifurcation versus a very small aneurysm measuring 1-2 mm.    Reviewed imaging from [facility] sites     Medical Records Reviewed:    Reviewed consults/documents from [facility] including  Family Medicine, ENT and Behavioral Health       Again, thank you for allowing me to participate in the care of your patient.      Sincerely,    Amisha Gautam PA-C

## 2025-01-23 NOTE — PATIENT INSTRUCTIONS
To DO:   Start LDN - side effects :anxiety, sleep disturbance , GI upset  Continue COQ10 (decrease to 1x a day)   Continue pacing  Continue Ritalin PRN  Follow up 2 months    Post COVID Self Care Suggestions:     Fatigue Management:       https://www.archives-pmr.org/action/showPdf?[email or web address]       Self Care:      [email or web address]  Recovery World Health Organization:    https://apps.who.int/iris/[employer]/handle/66933/169078/NLQ-QCCQ-5446-150-71637-10303-eng.pdf  Breathing exercises:    [email or web address]://[email or web address]/health/conditions-[email or web address]-diseases/coronavirus/coronavirus-recovery-breathing-exercises      Assessment of sense of smell and taste    Smell training:  Flowery - Robyn  Fruity - Lemon  Spicy - Cloves  Resinous - Eucalyptus      1 - Pour a few droplets of one of the oils on to a cotton pad or ball  2 - Do not try to sniff the pad immediately; leave it for a few minutes for the fragrance to develop  3 - Hold the first stick/pad up to your nose, about an inch away.  The order in which you test the oils does not matter  4 - Relax and try to inhale naturally through the nose - sniffing too quickly and deeply is likely to result in you not being able to detect anything  5 - Try this a couple more times, then rest for five minutes  6 - Move on to the next oil and repeat as above.    After three months switch to a new set of odors: menthol, thyme, tangerine, and anna, training with them twice daily.    After another three months, switch to a third new set of odors: green tea, bergamot, rosemary, and danielle, again training with them twice daily.    Studies:   [facility] Paxlovid [place]  [email or web address]/[email or web address]/research/paxalexandra-study/?[email or web address]    Cognitive Post-COVID study  z.[facility]/[email or web address]-ema    Supplements:  Fatigue- COQ10 100mg 3x day  ( side effects GI)  Brain fog-N-acetylcysteine 600 mg daily (side effects GI)

## 2025-01-25 ENCOUNTER — MYC REFILL (OUTPATIENT)
Dept: PHYSICAL MEDICINE AND REHAB | Facility: CLINIC | Age: 45
End: 2025-01-25
Payer: COMMERCIAL

## 2025-01-25 DIAGNOSIS — G93.31 POST VIRAL SYNDROME: ICD-10-CM

## 2025-01-25 DIAGNOSIS — R53.82 CHRONIC FATIGUE: ICD-10-CM

## 2025-01-27 ENCOUNTER — TELEPHONE (OUTPATIENT)
Dept: PHYSICAL MEDICINE AND REHAB | Facility: CLINIC | Age: 45
End: 2025-01-27
Payer: COMMERCIAL

## 2025-01-27 NOTE — TELEPHONE ENCOUNTER
Left Voicemail (1st Attempt) and Sent Mychart (1st Attempt) for the patient to call back and schedule the following:    Appointment type: Post covid return video  Provider: Amisha Gautam  Return date: around 3/23/2025   Specialty phone number: [phone]  Additional appointment(s) needed: NA  Additonal Notes: 2 month follow up    Tiffany Allen on 1/27/2025 at 2:53 PM

## 2025-01-27 NOTE — TELEPHONE ENCOUNTER
SITUATION:  Pt did not get Rx for compounded 1 mg naltrexone    BACKGROUND:  Order date 1/23/25  See progress notes of visit    ASSESSMENT / ACTION:  Checked with pt about which pharmacy she called to / left message to ask to clarify    [facility]ed to [facility] Rx [facility] sent to, they do not have ability to do compounded meds and do no have a source for compounding naltrexone at any of their other facilities    Retail pharmacy do not typically carry or provide compounded meds    REQUEST / RECOMMENDATION:  Rerouted / Sent Rx to [facility] instead.    Sent instruction to pt in this mychart encounter to contact the  compounding pharmacy so they can mail the Rx to her.      Melodie De Luna RN on 1/27/2025 at 11:38 AM

## 2025-01-29 ENCOUNTER — TELEPHONE (OUTPATIENT)
Dept: PHYSICAL MEDICINE AND REHAB | Facility: CLINIC | Age: 45
End: 2025-01-29
Payer: COMMERCIAL

## 2025-02-19 ASSESSMENT — ANXIETY QUESTIONNAIRES
GAD7 TOTAL SCORE: 14
1. FEELING NERVOUS, ANXIOUS, OR ON EDGE: MORE THAN HALF THE DAYS
7. FEELING AFRAID AS IF SOMETHING AWFUL MIGHT HAPPEN: MORE THAN HALF THE DAYS
4. TROUBLE RELAXING: MORE THAN HALF THE DAYS
8. IF YOU CHECKED OFF ANY PROBLEMS, HOW DIFFICULT HAVE THESE MADE IT FOR YOU TO DO YOUR WORK, TAKE CARE OF THINGS AT HOME, OR GET ALONG WITH OTHER PEOPLE?: VERY DIFFICULT
GAD7 TOTAL SCORE: 14
3. WORRYING TOO MUCH ABOUT DIFFERENT THINGS: MORE THAN HALF THE DAYS
6. BECOMING EASILY ANNOYED OR IRRITABLE: MORE THAN HALF THE DAYS
5. BEING SO RESTLESS THAT IT IS HARD TO SIT STILL: MORE THAN HALF THE DAYS
7. FEELING AFRAID AS IF SOMETHING AWFUL MIGHT HAPPEN: MORE THAN HALF THE DAYS
2. NOT BEING ABLE TO STOP OR CONTROL WORRYING: MORE THAN HALF THE DAYS
GAD7 TOTAL SCORE: 14

## 2025-02-19 ASSESSMENT — PATIENT HEALTH QUESTIONNAIRE - PHQ9
10. IF YOU CHECKED OFF ANY PROBLEMS, HOW DIFFICULT HAVE THESE PROBLEMS MADE IT FOR YOU TO DO YOUR WORK, TAKE CARE OF THINGS AT HOME, OR GET ALONG WITH OTHER PEOPLE: VERY DIFFICULT
SUM OF ALL RESPONSES TO PHQ QUESTIONS 1-9: 12
SUM OF ALL RESPONSES TO PHQ QUESTIONS 1-9: 12

## 2025-02-20 ENCOUNTER — VIRTUAL VISIT (OUTPATIENT)
Dept: BEHAVIORAL HEALTH | Facility: CLINIC | Age: 45
End: 2025-02-20
Payer: COMMERCIAL

## 2025-02-20 DIAGNOSIS — F41.1 GENERALIZED ANXIETY DISORDER: Primary | ICD-10-CM

## 2025-02-20 ASSESSMENT — ANXIETY QUESTIONNAIRES: GAD7 TOTAL SCORE: 14

## 2025-02-20 NOTE — PROGRESS NOTES
[facility]                                     Progress Note    Patient Name: Anahi Stockton  Date: 2/19/2025         Service Type: Individual      Session Start Time: 10:00am  Session End Time: 10:55am     Session Length: 55 minutes    Session #: 9    Attendees: Client attended alone    Service Modality:  Video Visit:      Provider verified identity through the following two step process.  Patient provided:  Patient is known previously to provider    Telemedicine Visit: The patient's condition can be safely assessed and treated via synchronous audio and visual telemedicine encounter.      Reason for Telemedicine Visit: Patient has requested telehealth visit    Originating Site (Patient Location): Patient's home    Distant Site (Provider Location): Provider Remote Setting- Home Office    Consent:  The patient/guardian has verbally consented to: the potential risks and benefits of telemedicine (video visit) versus in person care; bill my insurance or make self-payment for services provided; and responsibility for payment of non-covered services.     Patient would like the video invitation sent by:  My Chart    Mode of Communication:  Video Conference via [facility]    Distant Location (Provider):  Off-site    As the provider I attest to compliance with applicable laws and regulations related to telemedicine.    DATA  Extended Session (53+ minutes): PROLONGED SERVICE IN THE OUTPATIENT SETTING REQUIRING DIRECT (FACE-TO-FACE) PATIENT CONTACT BEYOND THE USUAL SERVICE:    - Treatment protocol required additional time to complete, due to the nature of diagnosis being treated. Patient is only seen monthly so requires the full 55 minutes in session to review life events and process thoughts and feelings.   Patient benefits from the extended session to have enough time to practice skills while in session with the therapist. See Interventions section for details  Interactive Complexity: No  Crisis:  "No        Progress Since Last Session (Related to Symptoms / Goals / Homework):   Symptoms: No change anxiety    Homework: Achieved / completed to satisfaction      Episode of Care Goals: Satisfactory progress - ACTION (Actively working towards change); Intervened by reinforcing change plan / affirming steps taken     Current / Ongoing Stressors and Concerns:   Patient provides a status update while therapist utilizes reflective listening skills. She shares that things have been stressful and she has been feeling more anxious. She shares that she did attempt to communicate with her  but notes that he is very irritable and gets upset easily. \"It's breaking my heart that he's not talking to me about his feelings.\" She is feeling disconnected from him and this feels heavy. She shares that her work review happens this time of year and she still hasn't received the forms back yet and this makes her feel very anxious. \"I feel like I'm failing everyone.\" She is hard on herself and often blames herself for things that are not her fault.      Treatment Objective(s) Addressed in This Session:   identify the fears / thoughts that contribute to feeling anxious  Re-frame unhelpful thoughts and beliefs of self  Gain insight      Intervention:  Reviewed the following CBT goals and strategies:  Learning to recognize one s distortions in thinking that are creating problems, and then to reevaluate them in light of reality.  Gaining a better understanding of the behavior and motivation of others.  Using problem-solving skills to cope with difficult situations.  Learning to develop a greater sense of confidence in one s own abilities and using thought re-framing to avoid inappropriate guilt and self-blame.  Reviewing CBT strategies to manage stress including mindfulness, grounding and deep breathing      Assessments completed prior to visit:  The following assessments were completed by patient for this visit:  PHQ9:       " 5/14/2024     9:49 PM 7/17/2024     7:03 PM 9/8/2024     2:34 PM 10/9/2024     7:11 PM 11/6/2024     7:19 PM 1/22/2025     6:59 PM 2/19/2025     6:30 PM   PHQ-9 SCORE   PHQ-9 Total Score MyChart 7 (Mild depression) 11 (Moderate depression)  7 (Mild depression)  13 (Moderate depression) 15 (Moderately severe depression) 15 (Moderately severe depression) 12 (Moderate depression)   PHQ-9 Total Score 7    7 11 7 13 15  15  12        Patient-reported    Proxy-reported     GAD7:       5/2/2024    10:49 AM 5/22/2024     2:34 PM 7/17/2024     7:03 PM 10/9/2024     7:12 PM 11/6/2024     7:20 PM 1/16/2025     9:55 AM 2/19/2025     6:33 PM   MING-7 SCORE   Total Score 11 (moderate anxiety)  9 (mild anxiety) 7 (mild anxiety)  14 (moderate anxiety) 9 (mild anxiety) 7 (mild anxiety) 14 (moderate anxiety)   Total Score 11 9 7 14 9  7  14        Patient-reported    Proxy-reported     PROMIS 10-Global Health (only subscores and total score):       4/4/2024     8:39 AM 5/2/2024    10:51 AM 7/17/2024     6:59 PM 11/13/2024     6:46 PM 12/11/2024     7:16 PM 2/19/2025     6:32 PM   PROMIS-10 Scores Only   Global Mental Health Score 13 12 11 11  10  8    Global Physical Health Score 12 12 12 11  11  12    PROMIS TOTAL - SUBSCORES 25 24 23 22  21  20        Patient-reported         ASSESSMENT: Current Emotional / Mental Status (status of significant symptoms):   Risk status (Self / Other harm or suicidal ideation)   Patient denies current fears or concerns for personal safety.   Patient denies current or recent suicidal ideation or behaviors.   Patient denies current or recent homicidal ideation or behaviors.   Patient denies current or recent self injurious behavior or ideation.   Patient denies other safety concerns.   Patient reports there has been no change in risk factors since their last session.     Patient reports there has been no change in protective factors since their last session.     Recommended that patient call 911 or go  to the local ED should there be a change in any of these risk factors     Appearance:   Appropriate    Eye Contact:   Good    Psychomotor Behavior: Normal    Attitude:   Cooperative    Orientation:   All   Speech    Rate / Production: Normal     Volume:  Normal    Mood:    Anxious  Depressed    Affect:    Appropriate  Worrisome    Thought Content:  Clear    Thought Form:  Coherent  Logical    Insight:    Good      Medication Review:   No changes to current psychiatric medication(s)     Medication Compliance:   Yes     Changes in Health Issues:   None reported     Chemical Use Review:   Substance Use: Chemical use reviewed, no active concerns identified      Tobacco Use: No current tobacco use.      Diagnosis:  1. Generalized anxiety disorder    R/O MDD    Collateral Reports Completed:   Not Applicable    PLAN: (Patient Tasks / Therapist Tasks / Other)  Return for a virtual visit in 1 month  Patient encouraged to continue participating in vulnerable conversations   Patient encouraged to practice grounding techniques   Patient encouraged to practice self-compassion and thought re-framing   Patient encouraged to consider a gratitude journal  Patient encouraged to use a journal to write out her feelings in heavy moments    There has been demonstrated improvement in functioning while patient has been engaged in psychotherapy/psychological service- if withdrawn the patient would deteriorate and/or relapse.           Carolyne Figueredo, [facility]                                                         ______________________________________________________________________    Individual Treatment Plan    Patient's Name: Anahi Stockton  YOB: 1980    Date of Creation: 5/2/2024  Date Treatment Plan Last Reviewed/Revised: 2/20/2025    DSM5 Diagnoses: 300.02 (F41.1) Generalized Anxiety Disorder  Psychosocial / Contextual Factors: 44 year old female, , mother of 2 girls  PROMIS (reviewed every 90 days):   PROMIS  10-Global Health (only subscores and total score):       4/4/2024     8:39 AM 5/2/2024    10:51 AM 7/17/2024     6:59 PM 11/13/2024     6:46 PM 12/11/2024     7:16 PM 2/19/2025     6:32 PM   PROMIS-10 Scores Only   Global Mental Health Score 13 12 11 11  10  8    Global Physical Health Score 12 12 12 11  11  12    PROMIS TOTAL - SUBSCORES 25 24 23 22  21  20        Referral / Collaboration:  Referral to another professional/service is not indicated at this time..    Anticipated number of session for this episode of care: 3-6 sessions  Anticipation frequency of session: Monthly  Anticipated Duration of each session: 53 or more minutes  Treatment plan will be reviewed in 90 days or when goals have been changed.       MeasurableTreatment Goal(s) related to diagnosis / functional impairment(s)  Goal 1: Patient will manage symptoms of anxiety as evidenced by a MING-7 score of 5 or lower    I will know I've met my goal when I feel more confident.      Objective #A (Patient Action)    Patient will identify the fears / thoughts that contribute to feeling anxious.  Status: Continued - Date(s): 2/20/2025     Intervention(s)  Therapist will teach emotional recognition/identification.   .    Objective #B  Patient will use relaxation strategies multiple times per day to reduce the physical symptoms of anxiety.  Status: Continued - Date(s): 2/20/2025     Intervention(s)  Therapist will  teach relaxation strategies .    Objective #C  Patient will use thought-stopping strategy daily to reduce intrusive thoughts.  Status: Continued - Date(s): 2/20/2025     Intervention(s)  Therapist will  teach thought stopping and other CBT strategies to manage thoughts .      Patient has reviewed and agreed to the above plan.      Carolyne Figueredo, [facility]  February 20, 2025

## 2025-02-27 ENCOUNTER — OFFICE VISIT (OUTPATIENT)
Dept: FAMILY MEDICINE | Facility: CLINIC | Age: 45
End: 2025-02-27
Payer: COMMERCIAL

## 2025-02-27 VITALS
BODY MASS INDEX: 23.02 KG/M2 | TEMPERATURE: 98 F | HEIGHT: 66 IN | DIASTOLIC BLOOD PRESSURE: 75 MMHG | RESPIRATION RATE: 16 BRPM | OXYGEN SATURATION: 100 % | SYSTOLIC BLOOD PRESSURE: 108 MMHG | WEIGHT: 143.25 LBS | HEART RATE: 68 BPM

## 2025-02-27 DIAGNOSIS — Z30.430 ENCOUNTER FOR INSERTION OF INTRAUTERINE CONTRACEPTIVE DEVICE: Primary | ICD-10-CM

## 2025-02-27 DIAGNOSIS — F33.1 MAJOR DEPRESSIVE DISORDER, RECURRENT, MODERATE (H): ICD-10-CM

## 2025-02-27 DIAGNOSIS — Z23 NEED FOR VACCINATION: ICD-10-CM

## 2025-02-27 DIAGNOSIS — Z71.84 TRAVEL ADVICE ENCOUNTER: ICD-10-CM

## 2025-02-27 RX ORDER — COPPER 313.4 MG/1
1 INTRAUTERINE DEVICE INTRAUTERINE ONCE
Status: COMPLETED | COMMUNITY
Start: 2025-02-27 | End: 2025-02-27

## 2025-02-27 RX ORDER — COPPER 313.4 MG/1
1 INTRAUTERINE DEVICE INTRAUTERINE ONCE
Status: DISCONTINUED | COMMUNITY
Start: 2025-02-27 | End: 2025-02-27

## 2025-02-27 RX ADMIN — COPPER 1 EACH: 313.4 INTRAUTERINE DEVICE INTRAUTERINE at 12:03

## 2025-02-27 NOTE — PROGRESS NOTES
"KIERSTEN Stockton is a 44 year old female here for IUD replacement and several other issues:  She has been seeing Amisha Gautam at the [facility] clinic for her chronic fatigue. She is taking CoQ-10 which helps with energy but causes some water retention, especially around her ankles.   Periods less heavy on iron supplement. When checked in the [facility], her hemoglobin was ok but iron was on the low side.   Ritalin has been helpful for energy. Taking 5 mg in the morning. She is coming to realize that she may have to live with some degree of fatigue for the rest of her life and she is accepting this and living the best she can even with it.   Her  had his Vestibular schwannoma removed 10 months ago. He still gets some Headaches, dizziness. In remission from non-Hodgkin's lymphoma but has had 6 recurrences in the 22 years since it was diagnosed. He Follows up every 6 months to a year. Works as a consultant for an insurance company, mostly remotely.   She would like to have her Paragard IUD replaced. It was placed in 2021 but there have been 2 occurrences where she could not find the strings so she would like a new one.   Going to [place] in June to visit her mother, wondering what vaccines she needs.   O  /75   Pulse 68   Temp 98  F (36.7  C) (Oral)   Resp 16   Ht 1.676 m (5' 5.98\")   Wt 65 kg (143 lb 4 oz)   LMP 02/06/2025 (Approximate)   SpO2 100%   BMI 23.14 kg/m     Vitals reviewed. Nursing note reviewed.  General Appearance: Pleasant and alert, in no acute distress  HEENT: mucous membranes moist  CV: RRR, no murmur, rubs, gallops  Resp: No respiratory distress. Clear to auscultation bilaterally. No wheezes, rales, rhonchi  Abd: Soft, nontender, nondistended, bowel sounds present. No masses.  : normal external genitalia and cervix, IUD strings visualized but barely poking out of the cervical os.   Ext: No peripheral edema, good distal perfusion  Skin: warm, dry, intact, no rash " noted  Neuro: no focal deficits, CNs II-XII normal.   Psych: mood and affect are normal.    A/P  Anahi was seen today for contraception and travel clinic.    Diagnoses and all orders for this visit:    Encounter for insertion of intrauterine contraceptive device: I viewed the IUD strings but they were barely poking out of the os and I think there is a risk that they could go completely into the cervix and become difficult to retrieve. So we will replace the IUD and have the strings longer.      -     INSERTION INTRAUTERINE DEVICE  SUBJECTIVE:  The patient desires to continue long-term contraception with IUD.  Her current  ParaGard IUD was placed in 2021.  Paraguard IUD desired today.      The patient understands the risks and benefits of intrauterine device insertion and agrees to proceed.      PROCEDURE AND FINDINGS: After appropriate discussion of risks and benefits of IUD placement, written informed consent was obtained.      The patient was placed in the dorsal lithotomy position, and a sterile speculum was inserted.  The cervix was visualized and IUD strings were grasped and the IUD was removed.  The cervix was then prepped with iodine.    A tenaculum was applied to the anterior lip of the cervix.   A sound was placed through the cervical os in sterile fashion, and the uterus sounded to 8cm. The IUD was loaded in the applicator in the usual fashion and the indicator placed according to the sound.  The applicator was inserted into the cervix and the intrauterine device placed high in the endometrial cavity.  The applicator was withdrawn and the strings trimmed to aprroximately 2-3 cm.  The patient tolerated the procedure well with no complications.    CONCLUSIONS/FOLLOW-UP:   IUD Removal:  Uneventful as above   IUD Insertion:  ParaGard.  Patient instructed to check for strings monthly.  She is given a card indicating the date of placement and date by which the IUD should be removed.  Follow-up with me in 1 months  to verify appropriate placement.      Major depressive disorder, recurrent, moderate (H): doing better overall with therapy, medications, acceptance of some of her chronic fatigue and fibromyalgia.     Need for vaccination  -     TYPHOID VACCINE, IM    Travel advice encounter: gave typhoid vaccine,  as above     I spent over 50 minutes on the encounter.       Faith Malik MD

## 2025-03-20 ENCOUNTER — VIRTUAL VISIT (OUTPATIENT)
Dept: BEHAVIORAL HEALTH | Facility: CLINIC | Age: 45
End: 2025-03-20
Payer: COMMERCIAL

## 2025-03-20 DIAGNOSIS — F41.1 GENERALIZED ANXIETY DISORDER: Primary | ICD-10-CM

## 2025-03-20 NOTE — PROGRESS NOTES
[facility]                                     Progress Note    Patient Name: Anahi Stockton  Date: 3/20/2025         Service Type: Individual      Session Start Time: 10:00am  Session End Time: 10:55am     Session Length: 55 minutes    Session #: 10    Attendees: Client attended alone    Service Modality:  Video Visit:      Provider verified identity through the following two step process.  Patient provided:  Patient is known previously to provider    Telemedicine Visit: The patient's condition can be safely assessed and treated via synchronous audio and visual telemedicine encounter.      Reason for Telemedicine Visit: Patient has requested telehealth visit    Originating Site (Patient Location): Patient's home    Distant Site (Provider Location): Provider Remote Setting- Home Office    Consent:  The patient/guardian has verbally consented to: the potential risks and benefits of telemedicine (video visit) versus in person care; bill my insurance or make self-payment for services provided; and responsibility for payment of non-covered services.     Patient would like the video invitation sent by:  My Chart    Mode of Communication:  Video Conference via [facility]    Distant Location (Provider):  Off-site    As the provider I attest to compliance with applicable laws and regulations related to telemedicine.    DATA  Extended Session (53+ minutes): PROLONGED SERVICE IN THE OUTPATIENT SETTING REQUIRING DIRECT (FACE-TO-FACE) PATIENT CONTACT BEYOND THE USUAL SERVICE:    - Treatment protocol required additional time to complete, due to the nature of diagnosis being treated. Patient is only seen monthly so requires the full 55 minutes in session to review life events and process thoughts and feelings.   Patient benefits from the extended session to have enough time to practice skills while in session with the therapist. See Interventions section for details  Interactive Complexity: No  Crisis:  "No        Progress Since Last Session (Related to Symptoms / Goals / Homework):   Symptoms: No change anxiety    Homework: Achieved / completed to satisfaction      Episode of Care Goals: Satisfactory progress - ACTION (Actively working towards change); Intervened by reinforcing change plan / affirming steps taken     Current / Ongoing Stressors and Concerns:   Patient provides a status update while therapist utilizes reflective listening skills. She shares that she struggled with fatigue and brain fog this morning but after taking the medication she is starting to feel a bit better. She takes this medication daily and it is helpful. She shares that her [facility] paperwork was completed and eventually approved by HR at work. There were some adjustments with her [facility] approval that took some time to understand and accept. She is working on her work/life balance - accepting that it's okay to do take-out instead of cooking dinner every night. She is learning to accept her new normal - \"I don't have to perfect everything.\" She is ready to be \"good enough\" and meet expectations as opposed to worrying about exceeding expectations. She has been working on not absorbing her 's problems - \"not letting my day be so impacted by his mood.\" She shares that her  has admitted to feeling depressed. He has been going to therapy for the past 6 months. She has been feeling more supported by him. She is struggling to practice self-care - \"this feels awful.\" She feels so tired and exhausted by the end of the day so it's been difficult.   They are considering a family trip to [place] in June.   Her oldest daughter graduates with her BA in May and is hoping to pursue her Master's Degree.      Treatment Objective(s) Addressed in This Session:   identify the fears / thoughts that contribute to feeling anxious  Re-frame unhelpful thoughts and beliefs of self  Gain insight      Intervention:  Discussed mindfulness as being aware of what " we are experiencing while we are experiencing it.  Contrasted this with avoidance and rumination.  Explored the role of mindfulness as an overall coping strategy for managing depression, anxiety, and strong emotions.  Explained concept of state of mind using SIFT (sensations, images, feelings, thoughts) pneumonic.  Led client in a guided mindfulness exercise focusing on sensations, images, feelings, and thoughts.  Discussed mindfulness as a tool to intentionally shift our awareness and focus as needed.        Assessments completed prior to visit:  The following assessments were completed by patient for this visit:  PHQ9:       5/14/2024     9:49 PM 7/17/2024     7:03 PM 9/8/2024     2:34 PM 10/9/2024     7:11 PM 11/6/2024     7:19 PM 1/22/2025     6:59 PM 2/19/2025     6:30 PM   PHQ-9 SCORE   PHQ-9 Total Score MyChart 7 (Mild depression) 11 (Moderate depression)  7 (Mild depression)  13 (Moderate depression) 15 (Moderately severe depression) 15 (Moderately severe depression) 12 (Moderate depression)   PHQ-9 Total Score 7    7 11 7 13 15  15  12        Patient-reported    Proxy-reported     GAD7:       5/2/2024    10:49 AM 5/22/2024     2:34 PM 7/17/2024     7:03 PM 10/9/2024     7:12 PM 11/6/2024     7:20 PM 1/16/2025     9:55 AM 2/19/2025     6:33 PM   MING-7 SCORE   Total Score 11 (moderate anxiety)  9 (mild anxiety) 7 (mild anxiety)  14 (moderate anxiety) 9 (mild anxiety) 7 (mild anxiety) 14 (moderate anxiety)   Total Score 11 9 7 14 9  7  14        Patient-reported    Proxy-reported     PROMIS 10-Global Health (only subscores and total score):       4/4/2024     8:39 AM 5/2/2024    10:51 AM 7/17/2024     6:59 PM 11/13/2024     6:46 PM 12/11/2024     7:16 PM 2/19/2025     6:32 PM 3/19/2025     7:52 PM   PROMIS-10 Scores Only   Global Mental Health Score 13 12 11 11  10  8  11    Global Physical Health Score 12 12 12 11  11  12  12    PROMIS TOTAL - SUBSCORES 25 24 23 22  21  20  23        Patient-reported          ASSESSMENT: Current Emotional / Mental Status (status of significant symptoms):   Risk status (Self / Other harm or suicidal ideation)   Patient denies current fears or concerns for personal safety.   Patient denies current or recent suicidal ideation or behaviors.   Patient denies current or recent homicidal ideation or behaviors.   Patient denies current or recent self injurious behavior or ideation.   Patient denies other safety concerns.   Patient reports there has been no change in risk factors since their last session.     Patient reports there has been no change in protective factors since their last session.     Recommended that patient call 911 or go to the local ED should there be a change in any of these risk factors     Appearance:   Appropriate    Eye Contact:   Good    Psychomotor Behavior: Normal    Attitude:   Cooperative    Orientation:   All   Speech    Rate / Production: Normal     Volume:  Normal    Mood:    Anxious  Depressed    Affect:    Appropriate  Worrisome    Thought Content:  Clear    Thought Form:  Coherent  Logical    Insight:    Good      Medication Review:   No changes to current psychiatric medication(s)     Medication Compliance:   Yes     Changes in Health Issues:   None reported     Chemical Use Review:   Substance Use: Chemical use reviewed, no active concerns identified      Tobacco Use: No current tobacco use.      Diagnosis:  1. Generalized anxiety disorder    R/O MDD    Collateral Reports Completed:   Not Applicable    PLAN: (Patient Tasks / Therapist Tasks / Other)  Return for a virtual visit in 1 month  Patient encouraged to continue participating in vulnerable conversations   Patient encouraged to practice grounding techniques   Patient encouraged to practice self-compassion and thought re-framing   Patient encouraged to use a journal to write out her feelings in heavy moments    There has been demonstrated improvement in functioning while patient has been engaged  in psychotherapy/psychological service- if withdrawn the patient would deteriorate and/or relapse.           Carolyne Figueredo, [facility]                                                         ______________________________________________________________________    Individual Treatment Plan    Patient's Name: Anahi Stockton  YOB: 1980    Date of Creation: 5/2/2024  Date Treatment Plan Last Reviewed/Revised: 2/20/2025    DSM5 Diagnoses: 300.02 (F41.1) Generalized Anxiety Disorder  Psychosocial / Contextual Factors: 44 year old female, , mother of 2 girls  PROMIS (reviewed every 90 days):   PROMIS 10-Global Health (only subscores and total score):       4/4/2024     8:39 AM 5/2/2024    10:51 AM 7/17/2024     6:59 PM 11/13/2024     6:46 PM 12/11/2024     7:16 PM 2/19/2025     6:32 PM   PROMIS-10 Scores Only   Global Mental Health Score 13 12 11 11  10  8    Global Physical Health Score 12 12 12 11  11  12    PROMIS TOTAL - SUBSCORES 25 24 23 22  21  20        Referral / Collaboration:  Referral to another professional/service is not indicated at this time..    Anticipated number of session for this episode of care: 3-6 sessions  Anticipation frequency of session: Monthly  Anticipated Duration of each session: 53 or more minutes  Treatment plan will be reviewed in 90 days or when goals have been changed.       MeasurableTreatment Goal(s) related to diagnosis / functional impairment(s)  Goal 1: Patient will manage symptoms of anxiety as evidenced by a MING-7 score of 5 or lower    I will know I've met my goal when I feel more confident.      Objective #A (Patient Action)    Patient will identify the fears / thoughts that contribute to feeling anxious.  Status: Continued - Date(s): 2/20/2025     Intervention(s)  Therapist will teach emotional recognition/identification.   .    Objective #B  Patient will use relaxation strategies multiple times per day to reduce the physical symptoms of anxiety.  Status:  Continued - Date(s): 2/20/2025     Intervention(s)  Therapist will  teach relaxation strategies .    Objective #C  Patient will use thought-stopping strategy daily to reduce intrusive thoughts.  Status: Continued - Date(s): 2/20/2025     Intervention(s)  Therapist will  teach thought stopping and other CBT strategies to manage thoughts .      Patient has reviewed and agreed to the above plan.      Carolyne Figueredo, [facility]  February 20, 2025

## 2025-04-09 ENCOUNTER — MYC REFILL (OUTPATIENT)
Dept: FAMILY MEDICINE | Facility: CLINIC | Age: 45
End: 2025-04-09
Payer: COMMERCIAL

## 2025-04-09 DIAGNOSIS — R53.82 CHRONIC FATIGUE: ICD-10-CM

## 2025-04-10 RX ORDER — METHYLPHENIDATE HYDROCHLORIDE 5 MG/1
5 TABLET ORAL 2 TIMES DAILY
Qty: 60 TABLET | Refills: 0 | Status: SHIPPED | OUTPATIENT
Start: 2025-04-10

## 2025-04-16 ASSESSMENT — PATIENT HEALTH QUESTIONNAIRE - PHQ9
SUM OF ALL RESPONSES TO PHQ QUESTIONS 1-9: 11
10. IF YOU CHECKED OFF ANY PROBLEMS, HOW DIFFICULT HAVE THESE PROBLEMS MADE IT FOR YOU TO DO YOUR WORK, TAKE CARE OF THINGS AT HOME, OR GET ALONG WITH OTHER PEOPLE: SOMEWHAT DIFFICULT
SUM OF ALL RESPONSES TO PHQ QUESTIONS 1-9: 11

## 2025-04-17 ENCOUNTER — VIRTUAL VISIT (OUTPATIENT)
Dept: BEHAVIORAL HEALTH | Facility: CLINIC | Age: 45
End: 2025-04-17
Payer: COMMERCIAL

## 2025-04-17 DIAGNOSIS — F41.1 GENERALIZED ANXIETY DISORDER: Primary | ICD-10-CM

## 2025-04-17 NOTE — PROGRESS NOTES
[facility]                                     Progress Note    Patient Name: Anahi Stockton  Date: 4/17/2025         Service Type: Individual      Session Start Time: 10:00am  Session End Time: 10:53am     Session Length: 53 minutes    Session #: 11    Attendees: Client attended alone    Service Modality:  Video Visit:      Provider verified identity through the following two step process.  Patient provided:  Patient is known previously to provider    Telemedicine Visit: The patient's condition can be safely assessed and treated via synchronous audio and visual telemedicine encounter.      Reason for Telemedicine Visit: Patient has requested telehealth visit    Originating Site (Patient Location): Patient's home    Distant Site (Provider Location): Provider Remote Setting- Home Office    Consent:  The patient/guardian has verbally consented to: the potential risks and benefits of telemedicine (video visit) versus in person care; bill my insurance or make self-payment for services provided; and responsibility for payment of non-covered services.     Patient would like the video invitation sent by:  My Chart    Mode of Communication:  Video Conference via [facility]    Distant Location (Provider):  Off-site    As the provider I attest to compliance with applicable laws and regulations related to telemedicine.    DATA  Extended Session (53+ minutes): PROLONGED SERVICE IN THE OUTPATIENT SETTING REQUIRING DIRECT (FACE-TO-FACE) PATIENT CONTACT BEYOND THE USUAL SERVICE:    - Treatment protocol required additional time to complete, due to the nature of diagnosis being treated. Patient is only seen monthly so requires the full 55 minutes in session to review life events and process thoughts and feelings.   Patient benefits from the extended session to have enough time to practice skills while in session with the therapist. See Interventions section for details  Interactive Complexity: No  Crisis:  No        Progress Since Last Session (Related to Symptoms / Goals / Homework):   Symptoms: No change anxiety    Homework: Achieved / completed to satisfaction      Episode of Care Goals: Satisfactory progress - ACTION (Actively working towards change); Intervened by reinforcing change plan / affirming steps taken     Current / Ongoing Stressors and Concerns:   Patient provides a status update while therapist utilizes reflective listening skills. She shares that she is feeling okay overall. She has been working extra hours temporarily until the end of May. She sometimes feels embarrassed about not being able to work full-time - has to re-frame this and remind herself that she's doing the best she can. She still feels exhausted without much energy to do anything else after work. They are planning a vacation to [place] in June which she is looking forward to. She acknowledges the uncertainty in the world that is a stressor. She shares that things have been a bit better with her . They are traveling to [place] for their daughter's graduation from college in a few weeks - patient is feeling very proud.      Treatment Objective(s) Addressed in This Session:   identify the fears / thoughts that contribute to feeling anxious  Re-frame unhelpful thoughts and beliefs of self  Gain insight      Intervention:  Reviewed Sangita Acosta's concept of self-compassion which is extending compassion to one's self in instances of perceived inadequacy, failure, or general suffering. Sangita Acosta has defined self-compassion as being composed of three main elements - self-kindness, common humanity, and mindfulness. These elements were reviewed during session today:  Self-kindness: Self-compassion entails being warm towards oneself when encountering pain and personal shortcomings, rather than ignoring them or hurting oneself with self-criticism.  Common humanity: Self-compassion also involves recognizing that suffering and personal failure  "is part of the shared human experience rather than isolating.  Mindfulness: Self-compassion requires taking a balanced approach to one's negative emotions so that feelings are neither suppressed nor exaggerated. Negative thoughts and emotions are observed with openness, so that they are held in mindful awareness. Mindfulness is a non-judgmental, receptive mind state in which individuals observe their thoughts and feelings as they are, without trying to suppress or deny them. Conversely, mindfulness requires that one not be \"over-identified\" with mental or emotional phenomena, so that one suffers aversive reactions. This latter type of response involves narrowly focusing and ruminating on one's negative emotions.        Assessments completed prior to visit:  The following assessments were completed by patient for this visit:  PHQ9:       7/17/2024     7:03 PM 9/8/2024     2:34 PM 10/9/2024     7:11 PM 11/6/2024     7:19 PM 1/22/2025     6:59 PM 2/19/2025     6:30 PM 4/16/2025     6:29 PM   PHQ-9 SCORE   PHQ-9 Total Score MyChart 11 (Moderate depression)  7 (Mild depression)  13 (Moderate depression) 15 (Moderately severe depression) 15 (Moderately severe depression) 12 (Moderate depression) 11 (Moderate depression)   PHQ-9 Total Score 11 7 13 15  15  12  11        Patient-reported    Proxy-reported     GAD7:       5/2/2024    10:49 AM 5/22/2024     2:34 PM 7/17/2024     7:03 PM 10/9/2024     7:12 PM 11/6/2024     7:20 PM 1/16/2025     9:55 AM 2/19/2025     6:33 PM   MING-7 SCORE   Total Score 11 (moderate anxiety)  9 (mild anxiety) 7 (mild anxiety)  14 (moderate anxiety) 9 (mild anxiety) 7 (mild anxiety) 14 (moderate anxiety)   Total Score 11 9 7 14 9  7  14        Patient-reported    Proxy-reported     PROMIS 10-Global Health (only subscores and total score):       4/4/2024     8:39 AM 5/2/2024    10:51 AM 7/17/2024     6:59 PM 11/13/2024     6:46 PM 12/11/2024     7:16 PM 2/19/2025     6:32 PM 3/19/2025     7:52 PM "   PROMIS-10 Scores Only   Global Mental Health Score 13 12 11 11  10  8  11    Global Physical Health Score 12 12 12 11  11  12  12    PROMIS TOTAL - SUBSCORES 25 24 23 22  21  20  23        Patient-reported         ASSESSMENT: Current Emotional / Mental Status (status of significant symptoms):   Risk status (Self / Other harm or suicidal ideation)   Patient denies current fears or concerns for personal safety.   Patient denies current or recent suicidal ideation or behaviors.   Patient denies current or recent homicidal ideation or behaviors.   Patient denies current or recent self injurious behavior or ideation.   Patient denies other safety concerns.   Patient reports there has been no change in risk factors since their last session.     Patient reports there has been no change in protective factors since their last session.     Recommended that patient call 911 or go to the local ED should there be a change in any of these risk factors     Appearance:   Appropriate    Eye Contact:   Good    Psychomotor Behavior: Normal    Attitude:   Cooperative    Orientation:   All   Speech    Rate / Production: Normal     Volume:  Normal    Mood:    Anxious  Depressed    Affect:    Appropriate  Worrisome    Thought Content:  Clear    Thought Form:  Coherent  Logical    Insight:    Good      Medication Review:   No changes to current psychiatric medication(s)     Medication Compliance:   Yes     Changes in Health Issues:   None reported     Chemical Use Review:   Substance Use: Chemical use reviewed, no active concerns identified      Tobacco Use: No current tobacco use.      Diagnosis:  1. Generalized anxiety disorder    R/O MDD    Collateral Reports Completed:   Not Applicable    PLAN: (Patient Tasks / Therapist Tasks / Other)  Return for a virtual visit in 1 month  *plan to meet again in May and then again after they return from [place] in July   Patient encouraged to continue participating in vulnerable conversations    Patient encouraged to practice grounding techniques   Patient encouraged to practice self-compassion and thought re-framing   Patient encouraged to use a journal to write out her feelings in heavy moments    There has been demonstrated improvement in functioning while patient has been engaged in psychotherapy/psychological service- if withdrawn the patient would deteriorate and/or relapse.           Carolyne Figueredo, [facility]                                                         ______________________________________________________________________    Individual Treatment Plan    Patient's Name: Anahi Stockton  YOB: 1980    Date of Creation: 5/2/2024  Date Treatment Plan Last Reviewed/Revised: 2/20/2025    DSM5 Diagnoses: 300.02 (F41.1) Generalized Anxiety Disorder  Psychosocial / Contextual Factors: 44 year old female of [place] descent, naturalized [place] citizen, , mother of 2 girls  PROMIS (reviewed every 90 days):   PROMIS 10-Global Health (only subscores and total score):       4/4/2024     8:39 AM 5/2/2024    10:51 AM 7/17/2024     6:59 PM 11/13/2024     6:46 PM 12/11/2024     7:16 PM 2/19/2025     6:32 PM   PROMIS-10 Scores Only   Global Mental Health Score 13 12 11 11  10  8    Global Physical Health Score 12 12 12 11  11  12    PROMIS TOTAL - SUBSCORES 25 24 23 22  21  20        Referral / Collaboration:  Referral to another professional/service is not indicated at this time..    Anticipated number of session for this episode of care: 3-6 sessions  Anticipation frequency of session: Monthly  Anticipated Duration of each session: 53 or more minutes  Treatment plan will be reviewed in 90 days or when goals have been changed.       MeasurableTreatment Goal(s) related to diagnosis / functional impairment(s)  Goal 1: Patient will manage symptoms of anxiety as evidenced by a MING-7 score of 5 or lower    I will know I've met my goal when I feel more confident.      Objective #A (Patient  Action)    Patient will identify the fears / thoughts that contribute to feeling anxious.  Status: Continued - Date(s): 2/20/2025     Intervention(s)  Therapist will teach emotional recognition/identification.   .    Objective #B  Patient will use relaxation strategies multiple times per day to reduce the physical symptoms of anxiety.  Status: Continued - Date(s): 2/20/2025     Intervention(s)  Therapist will  teach relaxation strategies .    Objective #C  Patient will use thought-stopping strategy daily to reduce intrusive thoughts.  Status: Continued - Date(s): 2/20/2025     Intervention(s)  Therapist will  teach thought stopping and other CBT strategies to manage thoughts .      Patient has reviewed and agreed to the above plan.      Carolyne Figueredo, [facility]  February 20, 2025

## 2025-05-21 ASSESSMENT — ANXIETY QUESTIONNAIRES
7. FEELING AFRAID AS IF SOMETHING AWFUL MIGHT HAPPEN: MORE THAN HALF THE DAYS
8. IF YOU CHECKED OFF ANY PROBLEMS, HOW DIFFICULT HAVE THESE MADE IT FOR YOU TO DO YOUR WORK, TAKE CARE OF THINGS AT HOME, OR GET ALONG WITH OTHER PEOPLE?: SOMEWHAT DIFFICULT
1. FEELING NERVOUS, ANXIOUS, OR ON EDGE: SEVERAL DAYS
5. BEING SO RESTLESS THAT IT IS HARD TO SIT STILL: SEVERAL DAYS
4. TROUBLE RELAXING: SEVERAL DAYS
7. FEELING AFRAID AS IF SOMETHING AWFUL MIGHT HAPPEN: MORE THAN HALF THE DAYS
GAD7 TOTAL SCORE: 8
GAD7 TOTAL SCORE: 8
6. BECOMING EASILY ANNOYED OR IRRITABLE: SEVERAL DAYS
IF YOU CHECKED OFF ANY PROBLEMS ON THIS QUESTIONNAIRE, HOW DIFFICULT HAVE THESE PROBLEMS MADE IT FOR YOU TO DO YOUR WORK, TAKE CARE OF THINGS AT HOME, OR GET ALONG WITH OTHER PEOPLE: SOMEWHAT DIFFICULT
3. WORRYING TOO MUCH ABOUT DIFFERENT THINGS: SEVERAL DAYS
GAD7 TOTAL SCORE: 8
2. NOT BEING ABLE TO STOP OR CONTROL WORRYING: SEVERAL DAYS

## 2025-05-21 ASSESSMENT — PATIENT HEALTH QUESTIONNAIRE - PHQ9
SUM OF ALL RESPONSES TO PHQ QUESTIONS 1-9: 11
SUM OF ALL RESPONSES TO PHQ QUESTIONS 1-9: 11
10. IF YOU CHECKED OFF ANY PROBLEMS, HOW DIFFICULT HAVE THESE PROBLEMS MADE IT FOR YOU TO DO YOUR WORK, TAKE CARE OF THINGS AT HOME, OR GET ALONG WITH OTHER PEOPLE: SOMEWHAT DIFFICULT

## 2025-05-22 ENCOUNTER — VIRTUAL VISIT (OUTPATIENT)
Dept: BEHAVIORAL HEALTH | Facility: CLINIC | Age: 45
End: 2025-05-22
Payer: COMMERCIAL

## 2025-05-22 DIAGNOSIS — F41.1 GENERALIZED ANXIETY DISORDER: Primary | ICD-10-CM

## 2025-05-22 NOTE — PROGRESS NOTES
[facility]                                     Progress Note    Patient Name: Anahi Stockton  Date: 5/22/2025         Service Type: Individual      Session Start Time: 10:01am  Session End Time: 10:54am     Session Length: 53 minutes    Session #: 12    Attendees: Client attended alone    Service Modality:  Video Visit:      Provider verified identity through the following two step process.  Patient provided:  Patient is known previously to provider    Telemedicine Visit: The patient's condition can be safely assessed and treated via synchronous audio and visual telemedicine encounter.      Reason for Telemedicine Visit: Patient has requested telehealth visit    Originating Site (Patient Location): Patient's home    Distant Site (Provider Location): Provider Remote Setting- Home Office    Consent:  The patient/guardian has verbally consented to: the potential risks and benefits of telemedicine (video visit) versus in person care; bill my insurance or make self-payment for services provided; and responsibility for payment of non-covered services.     Patient would like the video invitation sent by:  My Chart    Mode of Communication:  Video Conference via [facility]    Distant Location (Provider):  Off-site    As the provider I attest to compliance with applicable laws and regulations related to telemedicine.    DATA  Extended Session (53+ minutes): PROLONGED SERVICE IN THE OUTPATIENT SETTING REQUIRING DIRECT (FACE-TO-FACE) PATIENT CONTACT BEYOND THE USUAL SERVICE:    - Treatment protocol required additional time to complete, due to the nature of diagnosis being treated. Patient is only seen monthly so requires the 53 minutes+ in session to review life events and process thoughts and feelings.   Patient benefits from the extended session to have enough time to practice skills while in session with the therapist. See Interventions section for details  Interactive Complexity: No  Crisis:  "No        Progress Since Last Session (Related to Symptoms / Goals / Homework):   Symptoms: No change anxiety; physical symptoms of fatigue, nausea, dizzy, low energy    Homework: Achieved / completed to satisfaction      Episode of Care Goals: Satisfactory progress - ACTION (Actively working towards change); Intervened by reinforcing change plan / affirming steps taken     Current / Ongoing Stressors and Concerns:   Patient provides a status update while therapist utilizes reflective listening skills. Patient shares about going to [place] for her daughter's college graduation - she struggled to keep up due to low energy, nausea, dizzy and fatigue - this felt embarrassing to her - \"why is my body acting like it's 69, I'm only 45.\" This experience makes her feel worried about future travels.  She acknowledges the major transition of her daughter becoming an adult - \"she's not my little girl anymore.\" She admits that she may be having some difficulty engaging in the grief - experiencing some shame and guilt for her feelings. She expresses some worries about her daughter spending time in the [place] this summer.     *they are going to [place] next month       Treatment Objective(s) Addressed in This Session:   identify the fears / thoughts that contribute to feeling anxious  Re-frame unhelpful thoughts and beliefs of self  Gain insight      Intervention:  Reviewed Sangita Acosta's concept of self-compassion which is extending compassion to one's self in instances of perceived inadequacy, failure, or general suffering. Sangita Acosta has defined self-compassion as being composed of three main elements - self-kindness, common humanity, and mindfulness. These elements were reviewed during session today:  Self-kindness: Self-compassion entails being warm towards oneself when encountering pain and personal shortcomings, rather than ignoring them or hurting oneself with self-criticism.  Common humanity: Self-compassion also " "involves recognizing that suffering and personal failure is part of the shared human experience rather than isolating.  Mindfulness: Self-compassion requires taking a balanced approach to one's negative emotions so that feelings are neither suppressed nor exaggerated. Negative thoughts and emotions are observed with openness, so that they are held in mindful awareness. Mindfulness is a non-judgmental, receptive mind state in which individuals observe their thoughts and feelings as they are, without trying to suppress or deny them. Conversely, mindfulness requires that one not be \"over-identified\" with mental or emotional phenomena, so that one suffers aversive reactions. This latter type of response involves narrowly focusing and ruminating on one's negative emotions.        Assessments completed prior to visit:  The following assessments were completed by patient for this visit:  PHQ9:       9/8/2024     2:34 PM 10/9/2024     7:11 PM 11/6/2024     7:19 PM 1/22/2025     6:59 PM 2/19/2025     6:30 PM 4/16/2025     6:29 PM 5/21/2025     7:35 PM   PHQ-9 SCORE   PHQ-9 Total Score MyChart 7 (Mild depression)  13 (Moderate depression) 15 (Moderately severe depression) 15 (Moderately severe depression) 12 (Moderate depression) 11 (Moderate depression) 11 (Moderate depression)   PHQ-9 Total Score 7 13 15  15  12  11  11        Patient-reported    Proxy-reported     GAD7:       5/22/2024     2:34 PM 7/17/2024     7:03 PM 10/9/2024     7:12 PM 11/6/2024     7:20 PM 1/16/2025     9:55 AM 2/19/2025     6:33 PM 5/21/2025     7:36 PM   MING-7 SCORE   Total Score 9 (mild anxiety) 7 (mild anxiety)  14 (moderate anxiety) 9 (mild anxiety) 7 (mild anxiety) 14 (moderate anxiety) 8 (mild anxiety)   Total Score 9 7 14 9  7  14  8        Patient-reported    Proxy-reported     PROMIS 10-Global Health (only subscores and total score):       4/4/2024     8:39 AM 5/2/2024    10:51 AM 7/17/2024     6:59 PM 11/13/2024     6:46 PM 12/11/2024     " 7:16 PM 2/19/2025     6:32 PM 3/19/2025     7:52 PM   PROMIS-10 Scores Only   Global Mental Health Score 13 12 11 11  10  8  11    Global Physical Health Score 12 12 12 11  11  12  12    PROMIS TOTAL - SUBSCORES 25 24 23 22  21  20  23        Patient-reported         ASSESSMENT: Current Emotional / Mental Status (status of significant symptoms):   Risk status (Self / Other harm or suicidal ideation)   Patient denies current fears or concerns for personal safety.   Patient denies current or recent suicidal ideation or behaviors.   Patient denies current or recent homicidal ideation or behaviors.   Patient denies current or recent self injurious behavior or ideation.   Patient denies other safety concerns.   Patient reports there has been no change in risk factors since their last session.     Patient reports there has been no change in protective factors since their last session.     Recommended that patient call 911 or go to the local ED should there be a change in any of these risk factors     Appearance:   Appropriate    Eye Contact:   Good    Psychomotor Behavior: Normal    Attitude:   Cooperative    Orientation:   All   Speech    Rate / Production: Normal     Volume:  Normal    Mood:    Anxious  Depressed    Affect:    Appropriate  Worrisome    Thought Content:  Clear    Thought Form:  Coherent  Logical    Insight:    Good      Medication Review:   No changes to current psychiatric medication(s)     Medication Compliance:   Yes     Changes in Health Issues:   None reported     Chemical Use Review:   Substance Use: Chemical use reviewed, no active concerns identified      Tobacco Use: No current tobacco use.      Diagnosis:  1. Generalized anxiety disorder    R/O MDD    Collateral Reports Completed:   Not Applicable    PLAN: (Patient Tasks / Therapist Tasks / Other)  Return for a virtual visit after they return from [place] in July   Patient encouraged to continue participating in vulnerable conversations   Patient  "encouraged to practice grounding techniques   Patient encouraged to practice self-compassion and thought re-framing   Patient encouraged to use a journal to write out her feelings in heavy moments  *update PROMIS at next visit    \"You can worry and you can still be brave\"     There has been demonstrated improvement in functioning while patient has been engaged in psychotherapy/psychological service- if withdrawn the patient would deteriorate and/or relapse.           Carolyne Figueredo, [facility]                                                         ______________________________________________________________________    Individual Treatment Plan    Patient's Name: Anahi Stockton  YOB: 1980    Date of Creation: 5/2/2024  Date Treatment Plan Last Reviewed/Revised: 5/22/2025    DSM5 Diagnoses: 300.02 (F41.1) Generalized Anxiety Disorder  Psychosocial / Contextual Factors: 45 year old female of [place] descent, naturalized [place] citizen, , mother of 2 girls  PROMIS (reviewed every 90 days):   PROMIS 10-Global Health (only subscores and total score):       4/4/2024     8:39 AM 5/2/2024    10:51 AM 7/17/2024     6:59 PM 11/13/2024     6:46 PM 12/11/2024     7:16 PM 2/19/2025     6:32 PM 3/19/2025     7:52 PM   PROMIS-10 Scores Only   Global Mental Health Score 13 12 11 11  10  8  11    Global Physical Health Score 12 12 12 11  11  12  12    PROMIS TOTAL - SUBSCORES 25 24 23 22  21  20  23        Referral / Collaboration:  Referral to another professional/service is not indicated at this time..    Anticipated number of session for this episode of care: 3-6 sessions  Anticipation frequency of session: Monthly  Anticipated Duration of each session: 53 or more minutes  Treatment plan will be reviewed in 90 days or when goals have been changed.       MeasurableTreatment Goal(s) related to diagnosis / functional impairment(s)  Goal 1: Patient will manage symptoms of anxiety as evidenced by a MING-7 score of 5 or " lower    I will know I've met my goal when I feel more confident.      Objective #A (Patient Action)    Patient will identify the fears / thoughts that contribute to feeling anxious.  Status: Continued - Date(s): 5/22/2025     Intervention(s)  Therapist will teach emotional recognition/identification.  .    Objective #B  Patient will use relaxation strategies multiple times per day to reduce the physical symptoms of anxiety.  Status: Continued - Date(s): 5/22/2025     Intervention(s)  Therapist will teach relaxation strategies.    Objective #C  Patient will use thought-stopping strategy daily to reduce intrusive thoughts.  Status: Continued - Date(s): 5/22/2025     Intervention(s)  Therapist will teach thought stopping and other CBT strategies to manage thoughts.      Patient has reviewed and agreed to the above plan.      Carolyne Figueredo, [facility]  May 22, 2025

## 2025-07-23 ASSESSMENT — ANXIETY QUESTIONNAIRES
GAD7 TOTAL SCORE: 6
4. TROUBLE RELAXING: SEVERAL DAYS
GAD7 TOTAL SCORE: 6
IF YOU CHECKED OFF ANY PROBLEMS ON THIS QUESTIONNAIRE, HOW DIFFICULT HAVE THESE PROBLEMS MADE IT FOR YOU TO DO YOUR WORK, TAKE CARE OF THINGS AT HOME, OR GET ALONG WITH OTHER PEOPLE: SOMEWHAT DIFFICULT
GAD7 TOTAL SCORE: 6
2. NOT BEING ABLE TO STOP OR CONTROL WORRYING: SEVERAL DAYS
7. FEELING AFRAID AS IF SOMETHING AWFUL MIGHT HAPPEN: SEVERAL DAYS
1. FEELING NERVOUS, ANXIOUS, OR ON EDGE: SEVERAL DAYS
3. WORRYING TOO MUCH ABOUT DIFFERENT THINGS: SEVERAL DAYS
6. BECOMING EASILY ANNOYED OR IRRITABLE: SEVERAL DAYS
7. FEELING AFRAID AS IF SOMETHING AWFUL MIGHT HAPPEN: SEVERAL DAYS
5. BEING SO RESTLESS THAT IT IS HARD TO SIT STILL: NOT AT ALL
8. IF YOU CHECKED OFF ANY PROBLEMS, HOW DIFFICULT HAVE THESE MADE IT FOR YOU TO DO YOUR WORK, TAKE CARE OF THINGS AT HOME, OR GET ALONG WITH OTHER PEOPLE?: SOMEWHAT DIFFICULT

## 2025-07-23 ASSESSMENT — PATIENT HEALTH QUESTIONNAIRE - PHQ9
SUM OF ALL RESPONSES TO PHQ QUESTIONS 1-9: 12
SUM OF ALL RESPONSES TO PHQ QUESTIONS 1-9: 12
10. IF YOU CHECKED OFF ANY PROBLEMS, HOW DIFFICULT HAVE THESE PROBLEMS MADE IT FOR YOU TO DO YOUR WORK, TAKE CARE OF THINGS AT HOME, OR GET ALONG WITH OTHER PEOPLE: SOMEWHAT DIFFICULT

## 2025-07-24 ENCOUNTER — VIRTUAL VISIT (OUTPATIENT)
Dept: BEHAVIORAL HEALTH | Facility: CLINIC | Age: 45
End: 2025-07-24
Payer: COMMERCIAL

## 2025-07-24 DIAGNOSIS — F41.1 GENERALIZED ANXIETY DISORDER: Primary | ICD-10-CM

## 2025-07-24 NOTE — PROGRESS NOTES
[facility]                                     Progress Note    Patient Name: Anahi Stockton  Date: 7/24/2025         Service Type: Individual      Session Start Time: 9:01am  Session End Time: 9:51am     Session Length: 50 minutes    Session #: 13    Attendees: Client attended alone    Service Modality:  Video Visit:      Provider verified identity through the following two step process.  Patient provided:  Patient is known previously to provider    Telemedicine Visit: The patient's condition can be safely assessed and treated via synchronous audio and visual telemedicine encounter.      Reason for Telemedicine Visit: Patient has requested telehealth visit    Originating Site (Patient Location): Patient's home    Distant Site (Provider Location): Provider Remote Setting- Home Office    Consent:  The patient/guardian has verbally consented to: the potential risks and benefits of telemedicine (video visit) versus in person care; bill my insurance or make self-payment for services provided; and responsibility for payment of non-covered services.     Patient would like the video invitation sent by:  My Chart    Mode of Communication:  Video Conference via [facility]    Distant Location (Provider):  Off-site    As the provider I attest to compliance with applicable laws and regulations related to telemedicine.    DATA  Interactive Complexity: No  Crisis: No        Progress Since Last Session (Related to Symptoms / Goals / Homework):   Symptoms: Improving anxiety, less worries, ongoing issues with fatigue    Homework: Achieved / completed to satisfaction      Episode of Care Goals: Satisfactory progress - ACTION (Actively working towards change); Intervened by reinforcing change plan / affirming steps taken     Current / Ongoing Stressors and Concerns:   Patient shares about traveling to [place] with her family. She shares about efforts to manage her expectations while on the trip including energy  "budgeting and resting without getting down on herself. She is trying to stay present instead of focusing so much on the worries and feelings of sadness.  She plans to continue working on maintaining healthy boundaries. Overall, patient has been feeling quite good since her last therapy visit.        Treatment Objective(s) Addressed in This Session:   identify the fears / thoughts that contribute to feeling anxious  Re-frame unhelpful thoughts and beliefs of self  Gain insight      Intervention:  Reviewed Sangita Acosta's concept of self-compassion which is extending compassion to one's self in instances of perceived inadequacy, failure, or general suffering. Sangita Acosta has defined self-compassion as being composed of three main elements - self-kindness, common humanity, and mindfulness. These elements were reviewed during session today:  Self-kindness: Self-compassion entails being warm towards oneself when encountering pain and personal shortcomings, rather than ignoring them or hurting oneself with self-criticism.  Common humanity: Self-compassion also involves recognizing that suffering and personal failure is part of the shared human experience rather than isolating.  Mindfulness: Self-compassion requires taking a balanced approach to one's negative emotions so that feelings are neither suppressed nor exaggerated. Negative thoughts and emotions are observed with openness, so that they are held in mindful awareness. Mindfulness is a non-judgmental, receptive mind state in which individuals observe their thoughts and feelings as they are, without trying to suppress or deny them. Conversely, mindfulness requires that one not be \"over-identified\" with mental or emotional phenomena, so that one suffers aversive reactions. This latter type of response involves narrowly focusing and ruminating on one's negative emotions.        Assessments completed prior to visit:  The following assessments were completed by patient " for this visit:  PHQ9:       10/9/2024     7:11 PM 11/6/2024     7:19 PM 1/22/2025     6:59 PM 2/19/2025     6:30 PM 4/16/2025     6:29 PM 5/21/2025     7:35 PM 7/23/2025     7:04 PM   PHQ-9 SCORE   PHQ-9 Total Score MyChart 13 (Moderate depression) 15 (Moderately severe depression) 15 (Moderately severe depression) 12 (Moderate depression) 11 (Moderate depression) 11 (Moderate depression) 12 (Moderate depression)   PHQ-9 Total Score 13 15  15  12  11  11  12        Patient-reported     GAD7:       7/17/2024     7:03 PM 10/9/2024     7:12 PM 11/6/2024     7:20 PM 1/16/2025     9:55 AM 2/19/2025     6:33 PM 5/21/2025     7:36 PM 7/23/2025     7:04 PM   MING-7 SCORE   Total Score 7 (mild anxiety)  14 (moderate anxiety) 9 (mild anxiety) 7 (mild anxiety) 14 (moderate anxiety) 8 (mild anxiety) 6 (mild anxiety)   Total Score 7 14 9  7  14  8  6        Patient-reported    Proxy-reported     PROMIS 10-Global Health (only subscores and total score):       5/2/2024    10:51 AM 7/17/2024     6:59 PM 11/13/2024     6:46 PM 12/11/2024     7:16 PM 2/19/2025     6:32 PM 3/19/2025     7:52 PM 7/23/2025     7:06 PM   PROMIS-10 Scores Only   Global Mental Health Score 12 11 11  10  8  11  11    Global Physical Health Score 12 12 11  11  12  12  10    PROMIS TOTAL - SUBSCORES 24 23 22  21  20  23  21        Patient-reported         ASSESSMENT: Current Emotional / Mental Status (status of significant symptoms):   Risk status (Self / Other harm or suicidal ideation)   Patient denies current fears or concerns for personal safety.   Patient denies current or recent suicidal ideation or behaviors.   Patient denies current or recent homicidal ideation or behaviors.   Patient denies current or recent self injurious behavior or ideation.   Patient denies other safety concerns.   Patient reports there has been no change in risk factors since their last session.     Patient reports there has been no change in protective factors since their last  "session.     Recommended that patient call 911 or go to the local ED should there be a change in any of these risk factors     Appearance:   Appropriate    Eye Contact:   Good    Psychomotor Behavior: Normal    Attitude:   Cooperative    Orientation:   All   Speech    Rate / Production: Normal     Volume:  Normal    Mood:    Anxious  Depressed    Affect:    Appropriate  Worrisome    Thought Content:  Clear    Thought Form:  Coherent  Logical    Insight:    Good      Medication Review:   No changes to current psychiatric medication(s)     Medication Compliance:   Yes     Changes in Health Issues:   None reported     Chemical Use Review:   Substance Use: Chemical use reviewed, no active concerns identified      Tobacco Use: No current tobacco use.      Diagnosis:  1. Generalized anxiety disorder    R/O MDD    Collateral Reports Completed:   Not Applicable    PLAN: (Patient Tasks / Therapist Tasks / Other)  Return for a virtual visit after they return in 1 month  Patient encouraged to practice healthy boundaries and self-compassion      \"You can worry and you can still be brave\"     There has been demonstrated improvement in functioning while patient has been engaged in psychotherapy/psychological service- if withdrawn the patient would deteriorate and/or relapse.           Carolyne Figueredo, [facility]                                                         ______________________________________________________________________    Individual Treatment Plan    Patient's Name: Anahi Stockton  YOB: 1980    Date of Creation: 5/2/2024  Date Treatment Plan Last Reviewed/Revised: 7/23/2025    DSM5 Diagnoses: 300.02 (F41.1) Generalized Anxiety Disorder  Psychosocial / Contextual Factors: 45 year old female of [place] descent, naturalized [place] citizen, , mother of 2 girls  PROMIS (reviewed every 90 days):   PROMIS 10-Global Health (only subscores and total score):       5/2/2024    10:51 AM 7/17/2024     6:59 PM " 11/13/2024     6:46 PM 12/11/2024     7:16 PM 2/19/2025     6:32 PM 3/19/2025     7:52 PM 7/23/2025     7:06 PM   PROMIS-10 Scores Only   Global Mental Health Score 12 11 11  10  8  11  11    Global Physical Health Score 12 12 11  11  12  12  10    PROMIS TOTAL - SUBSCORES 24 23 22  21  20  23  21        Referral / Collaboration:  Referral to another professional/service is not indicated at this time..    Anticipated number of session for this episode of care: 3-6 sessions  Anticipation frequency of session: Monthly  Anticipated Duration of each session: 53 or more minutes  Treatment plan will be reviewed in 90 days or when goals have been changed.       MeasurableTreatment Goal(s) related to diagnosis / functional impairment(s)  Goal 1: Patient will manage symptoms of anxiety as evidenced by a MING-7 score of 5 or lower    I will know I've met my goal when I feel more confident.      Objective #A (Patient Action)    Patient will identify the fears / thoughts that contribute to feeling anxious.  Status: Continued - Date(s): 7/23/2025     Intervention(s)  Therapist will teach emotional recognition/identification.  .    Objective #B  Patient will use relaxation strategies multiple times per day to reduce the physical symptoms of anxiety.  Status: Continued - Date(s): 7/23/2025     Intervention(s)  Therapist will teach relaxation strategies.    Objective #C  Patient will use thought-stopping strategy daily to reduce intrusive thoughts.  Status: Continued - Date(s): 7/23/2025     Intervention(s)  Therapist will teach thought stopping and other CBT strategies to manage thoughts.      Patient has reviewed and agreed to the above plan.      Carolyne Figueredo, [facility]  July 23, 2025

## 2025-08-27 ASSESSMENT — ANXIETY QUESTIONNAIRES
4. TROUBLE RELAXING: MORE THAN HALF THE DAYS
GAD7 TOTAL SCORE: 9
6. BECOMING EASILY ANNOYED OR IRRITABLE: SEVERAL DAYS
5. BEING SO RESTLESS THAT IT IS HARD TO SIT STILL: NOT AT ALL
GAD7 TOTAL SCORE: 9
IF YOU CHECKED OFF ANY PROBLEMS ON THIS QUESTIONNAIRE, HOW DIFFICULT HAVE THESE PROBLEMS MADE IT FOR YOU TO DO YOUR WORK, TAKE CARE OF THINGS AT HOME, OR GET ALONG WITH OTHER PEOPLE: SOMEWHAT DIFFICULT
GAD7 TOTAL SCORE: 9
7. FEELING AFRAID AS IF SOMETHING AWFUL MIGHT HAPPEN: SEVERAL DAYS
3. WORRYING TOO MUCH ABOUT DIFFERENT THINGS: MORE THAN HALF THE DAYS
2. NOT BEING ABLE TO STOP OR CONTROL WORRYING: MORE THAN HALF THE DAYS
8. IF YOU CHECKED OFF ANY PROBLEMS, HOW DIFFICULT HAVE THESE MADE IT FOR YOU TO DO YOUR WORK, TAKE CARE OF THINGS AT HOME, OR GET ALONG WITH OTHER PEOPLE?: SOMEWHAT DIFFICULT
1. FEELING NERVOUS, ANXIOUS, OR ON EDGE: SEVERAL DAYS
7. FEELING AFRAID AS IF SOMETHING AWFUL MIGHT HAPPEN: SEVERAL DAYS

## 2025-08-27 ASSESSMENT — PATIENT HEALTH QUESTIONNAIRE - PHQ9
10. IF YOU CHECKED OFF ANY PROBLEMS, HOW DIFFICULT HAVE THESE PROBLEMS MADE IT FOR YOU TO DO YOUR WORK, TAKE CARE OF THINGS AT HOME, OR GET ALONG WITH OTHER PEOPLE: SOMEWHAT DIFFICULT
SUM OF ALL RESPONSES TO PHQ QUESTIONS 1-9: 13
SUM OF ALL RESPONSES TO PHQ QUESTIONS 1-9: 13

## 2025-08-28 ENCOUNTER — VIRTUAL VISIT (OUTPATIENT)
Dept: BEHAVIORAL HEALTH | Facility: CLINIC | Age: 45
End: 2025-08-28
Payer: COMMERCIAL

## 2025-08-28 DIAGNOSIS — F41.1 GENERALIZED ANXIETY DISORDER: Primary | ICD-10-CM
